# Patient Record
Sex: MALE | Race: WHITE | Employment: FULL TIME | URBAN - NONMETROPOLITAN AREA
[De-identification: names, ages, dates, MRNs, and addresses within clinical notes are randomized per-mention and may not be internally consistent; named-entity substitution may affect disease eponyms.]

---

## 2017-01-18 ENCOUNTER — OFFICE VISIT (OUTPATIENT)
Dept: FAMILY MEDICINE | Facility: OTHER | Age: 37
End: 2017-01-18
Attending: FAMILY MEDICINE
Payer: MEDICARE

## 2017-01-18 VITALS
TEMPERATURE: 97 F | BODY MASS INDEX: 31.57 KG/M2 | DIASTOLIC BLOOD PRESSURE: 74 MMHG | WEIGHT: 220 LBS | SYSTOLIC BLOOD PRESSURE: 110 MMHG | RESPIRATION RATE: 18 BRPM | OXYGEN SATURATION: 98 % | HEART RATE: 78 BPM

## 2017-01-18 DIAGNOSIS — F84.0 AUTISM: ICD-10-CM

## 2017-01-18 DIAGNOSIS — F70 MILD MENTAL RETARDATION (I.Q. 50-70): ICD-10-CM

## 2017-01-18 DIAGNOSIS — Z00.00 ROUTINE GENERAL MEDICAL EXAMINATION AT A HEALTH CARE FACILITY: Primary | ICD-10-CM

## 2017-01-18 PROCEDURE — 99395 PREV VISIT EST AGE 18-39: CPT | Performed by: FAMILY MEDICINE

## 2017-01-18 ASSESSMENT — PAIN SCALES - GENERAL: PAINLEVEL: NO PAIN (0)

## 2017-01-18 NOTE — MR AVS SNAPSHOT
"              After Visit Summary   1/18/2017    Casey Pedro    MRN: [id number]           Patient Information     Date Of Birth          1980        Visit Information        Provider Department      1/18/2017 9:00 AM Berny Velasquez MD [facility]3]bing        Today's Diagnoses     Annual NHS Examination    -  1     Mental retardation         Autism           Care Instructions    Continue same medications.          Follow-ups after your visit        Follow-up notes from your care team     Return in about 1 year (around 1/18/2018) for Comprehensive Exam.      Who to contact     If you have questions or need follow up information about today's clinic visit or your schedule please contact [facility] directly at [phone].  Normal or non-critical lab and imaging results will be communicated to you by MyChart, letter or phone within 4 business days after the clinic has received the results. If you do not hear from us within 7 days, please contact the clinic through MyChart or phone. If you have a critical or abnormal lab result, we will notify you by phone as soon as possible.  Submit refill requests through [employer] or call your pharmacy and they will forward the refill request to us. Please allow 3 business days for your refill to be completed.          Additional Information About Your Visit        MyChart Information     [employer] lets you send messages to your doctor, view your test results, renew your prescriptions, schedule appointments and more. To sign up, go to [email or web address] . Click on \"Log in\" on the left side of the screen, which will take you to the Welcome page. Then click on \"Sign up Now\" on the right side of the page.     You will be asked to enter the access code listed below, as well as some personal information. Please follow the directions to create your username and password.     Your access code is: [email or web address]  Expires: 4/20/2017  7:49 AM     Your access " code will  in 90 days. If you need help or a new code, please call your [facility] clinic or [phone].        Care EveryWhere ID     This is your Care EveryWhere ID. This could be used by other organizations to access your [facility] medical records  [id number]        Your Vitals Were     Pulse Temperature Respirations Pulse Oximetry          78 97  F (36.1  C) (Tympanic) 18 98%         Blood Pressure from Last 3 Encounters:   17 110/74   16 110/66   16 112/68    Weight from Last 3 Encounters:   17 220 lb (99.791 kg)   16 215 lb (97.523 kg)   16 212 lb (96.163 kg)              Today, you had the following     No orders found for display       Primary Care Provider Office Phone # Fax #    Berny Velasquez [phone]       [facility] [address] [place] [address]        Thank you!     Thank you for choosing [facility]  for your care. Our goal is always to provide you with excellent care. Hearing back from our patients is one way we can continue to improve our services. Please take a few minutes to complete the written survey that you may receive in the mail after your visit with us. Thank you!             Your Updated Medication List - Protect others around you: Learn how to safely use, store and throw away your medicines at www.disposemymeds.org.          This list is accurate as of: 17 11:59 PM.  Always use your most recent med list.                   Brand Name Dispense Instructions for use    guanFACINE 1 MG tablet    TENEX    60 tablet    Take 1 tablet (1 mg) by mouth 2 times daily       loratadine 10 MG tablet    CLARITIN    30 tablet    Take 1 tablet (10 mg) by mouth daily       omega 3 1000 MG Caps     100 capsule    Take 2 capsules by mouth daily       * zyPREXA 10 MG tablet   Generic drug:  OLANZapine      5 mg Take one tablet (10mg) by oral route every day       * ZYPREXA 20 MG tablet   Generic drug:  OLANZapine       Take 20 mg by mouth daily Take with the 10mg.       * ZYPREXA PO      Take 5 mg by mouth At noon and 2.5 mg as needed       * Notice:  This list has 3 medication(s) that are the same as other medications prescribed for you. Read the directions carefully, and ask your doctor or other care provider to review them with you.

## 2017-01-18 NOTE — NURSING NOTE
"Chief Complaint   Patient presents with     Physical       Initial /74 mmHg  Pulse 78  Temp(Src) 97  F (36.1  C) (Tympanic)  Resp 18  Wt 220 lb (99.791 kg)  SpO2 98% Estimated body mass index is 31.57 kg/(m^2) as calculated from the following:    Height as of 7/19/16: 5' 10\" (1.778 m).    Weight as of this encounter: 220 lb (99.791 kg).  BP completed using cuff size: yehuda Odonnell      "

## 2017-01-18 NOTE — PROGRESS NOTES
SUBJECTIVE:  Casey Pedro, 36 year old, male is seen for Annual [facility] Examination.  He generally feels well.  Staff notes no issues.    Denies chest pain, dyspnea, decreased exercise tolerance, dizzyness, nausea, vomiting, diaphoresis, palpitations, numbness, tingling, or paresthesias.      Current Outpatient Prescriptions   Medication Sig Dispense Refill     omega 3 1000 MG CAPS Take 2 capsules by mouth daily 100 capsule 5     loratadine (CLARITIN) 10 MG tablet Take 1 tablet (10 mg) by mouth daily 30 tablet 9     OLANZapine (ZYPREXA PO) Take 5 mg by mouth At noon and 2.5 mg as needed       guanFACINE (TENEX) 1 MG tablet Take 1 tablet (1 mg) by mouth 2 times daily 60 tablet 10     OLANZapine (ZYPREXA) 20 MG tablet Take 20 mg by mouth daily Take with the 10mg.       OLANZapine (ZYPREXA) 10 MG tablet 5 mg Take one tablet (10mg) by oral route every day          No Known Allergies    Past Medical History   Diagnosis Date     Autism 09/16/2011     Mental retardation 09/16/2011     BPH 09/16/2011     History reviewed. No pertinent past surgical history.  Family History   Problem Relation Age of Onset     Alcohol/Drug Father      alcoholism     Social History     Social History     Marital Status: Single     Spouse Name: N/A     Number of Children: N/A     Years of Education: N/A     Occupational History     Not on file.     Social History Main Topics     Smoking status: Never Smoker      Smokeless tobacco: Never Used      Comment: no passive exposure     Alcohol Use: No     Drug Use: No     Sexual Activity: No     Other Topics Concern     Caffeine Concern No     Exercise Yes     daily     Seat Belt Yes     Parent/Sibling W/ Cabg, Mi Or Angioplasty Before 65f 55m? No     unknown     Social History Narrative         Review Of Systems  Constitutional, neuro, ENT, endocrine, pulmonary, cardiac, gastrointestinal, genitourinary, musculoskeletal, integument and psychiatric systems are negative, except as otherwise noted by  staff.    OBJECTIVE:  Vitals: B/P: 110/68, T: 97.7, P: 92, R: 18    Exam:  Physical Exam   Constitutional: He is oriented to person, place, and time. He appears well-developed and well-nourished. No distress.   HENT:   Head: Normocephalic and atraumatic.   Right Ear: External ear normal.   Left Ear: External ear normal.   Nose: Nose normal.   Mouth/Throat: Oropharynx is clear and moist.   Eyes: Conjunctivae and EOM are normal. Pupils are equal, round, and reactive to light.   Neck: Normal range of motion. Neck supple. No JVD present. No thyromegaly present.   Cardiovascular: Normal rate, regular rhythm, normal heart sounds and intact distal pulses.  Exam reveals no gallop and no friction rub.    No murmur heard.  Pulmonary/Chest: Effort normal and breath sounds normal. He has no wheezes. He has no rales.   Abdominal: Soft. Bowel sounds are normal. He exhibits no mass. There is no tenderness.   Genitourinary: Rectum normal, prostate normal and penis normal.   Musculoskeletal: Normal range of motion.   Lymphadenopathy:     He has no cervical adenopathy.   Neurological: He is alert and oriented to person, place, and time. He has normal reflexes.   Skin: Skin is warm and dry.   Psychiatric: He has a normal mood and affect.         Labs:  Office Visit on 11/18/2015   Component Date Value Ref Range Status     WBC 11/18/2015 6.8  4.0 - 11.0 10e9/L Final     RBC Count 11/18/2015 5.46  4.4 - 5.9 10e12/L Final     Hemoglobin 11/18/2015 15.9  13.3 - 17.7 g/dL Final     Hematocrit 11/18/2015 45.7  40.0 - 53.0 % Final     MCV 11/18/2015 84  78 - 100 fl Final     MCH 11/18/2015 29.1  26.5 - 33.0 pg Final     MCHC 11/18/2015 34.8  31.5 - 36.5 g/dL Final     RDW 11/18/2015 13.3  10.0 - 15.0 % Final     Platelet Count 11/18/2015 213  150 - 450 10e9/L Final     Diff Method 11/18/2015 Automated Method   Final     % Neutrophils 11/18/2015 60.5   Final     % Lymphocytes 11/18/2015 24.1   Final     % Monocytes 11/18/2015 12.1   Final      % Eosinophils 11/18/2015 2.7   Final     % Basophils 11/18/2015 0.3   Final     % Immature Granulocytes 11/18/2015 0.3   Final     Absolute Neutrophil 11/18/2015 4.1  1.6 - 8.3 10e9/L Final     Absolute Lymphocytes 11/18/2015 1.6  0.8 - 5.3 10e9/L Final     Absolute Monoctyes 11/18/2015 0.8  0.0 - 1.3 10e9/L Final     Absolute Eosinophils 11/18/2015 0.2  0.0 - 0.7 10e9/L Final     Absolute Basophils 11/18/2015 0.0  0.0 - 0.2 10e9/L Final     Abs Immature Granulocytes 11/18/2015 0.0  0 - 0.4 10e9/L Final     Sodium 11/18/2015 139  133 - 144 mmol/L Final     Potassium 11/18/2015 4.2  3.4 - 5.3 mmol/L Final     Chloride 11/18/2015 108  94 - 109 mmol/L Final     Carbon Dioxide 11/18/2015 26  20 - 32 mmol/L Final     Anion Gap 11/18/2015 5  3 - 14 mmol/L Final     Glucose 11/18/2015 88  70 - 99 mg/dL Final     Urea Nitrogen 11/18/2015 11  7 - 30 mg/dL Final     Creatinine 11/18/2015 0.76  0.66 - 1.25 mg/dL Final     GFR Estimate 11/18/2015   >60 mL/min/1.7m2 Final                    Value:>90  Non  GFR Calc       GFR Estimate If Black 11/18/2015   >60 mL/min/1.7m2 Final                    Value:>90   GFR Calc       Calcium 11/18/2015 9.1  8.5 - 10.1 mg/dL Final     TSH 11/18/2015 0.68  0.40 - 4.00 mU/L Final       ASSESSMENT/PLAN:  Annual NHS Examination  Discussed with staff.  Current medications, allergies, and plan of care reviewed. Nutritional and screening labs drawn.  Resident is free of communicable disease and does not need 24 hour professional nursing care.  Routine exam one year.        Mental retardation  Stable    Autism  Behaviors reviewed              Berny Velasquez MD

## 2017-01-31 ENCOUNTER — TRANSFERRED RECORDS (OUTPATIENT)
Dept: HEALTH INFORMATION MANAGEMENT | Facility: HOSPITAL | Age: 37
End: 2017-01-31

## 2017-02-24 ENCOUNTER — TELEPHONE (OUTPATIENT)
Dept: FAMILY MEDICINE | Facility: OTHER | Age: 37
End: 2017-02-24

## 2017-02-24 DIAGNOSIS — F91.9 DISRUPTIVE BEHAVIOR DISORDER: ICD-10-CM

## 2017-02-24 DIAGNOSIS — K21.9 GASTROESOPHAGEAL REFLUX DISEASE WITHOUT ESOPHAGITIS: ICD-10-CM

## 2017-02-24 DIAGNOSIS — K21.9 GASTROESOPHAGEAL REFLUX DISEASE WITHOUT ESOPHAGITIS: Primary | ICD-10-CM

## 2017-02-24 LAB
BASOPHILS # BLD AUTO: 0.1 10E9/L (ref 0–0.2)
BASOPHILS NFR BLD AUTO: 0.8 %
DIFFERENTIAL METHOD BLD: NORMAL
EOSINOPHIL # BLD AUTO: 0.2 10E9/L (ref 0–0.7)
EOSINOPHIL NFR BLD AUTO: 3.9 %
ERYTHROCYTE [DISTWIDTH] IN BLOOD BY AUTOMATED COUNT: 12.2 % (ref 10–15)
HCT VFR BLD AUTO: 44.2 % (ref 40–53)
HGB BLD-MCNC: 15.6 G/DL (ref 13.3–17.7)
IMM GRANULOCYTES # BLD: 0 10E9/L (ref 0–0.4)
IMM GRANULOCYTES NFR BLD: 0.5 %
LYMPHOCYTES # BLD AUTO: 2.7 10E9/L (ref 0.8–5.3)
LYMPHOCYTES NFR BLD AUTO: 44.6 %
MCH RBC QN AUTO: 29.7 PG (ref 26.5–33)
MCHC RBC AUTO-ENTMCNC: 35.3 G/DL (ref 31.5–36.5)
MCV RBC AUTO: 84 FL (ref 78–100)
MONOCYTES # BLD AUTO: 0.5 10E9/L (ref 0–1.3)
MONOCYTES NFR BLD AUTO: 8.8 %
NEUTROPHILS # BLD AUTO: 2.5 10E9/L (ref 1.6–8.3)
NEUTROPHILS NFR BLD AUTO: 41.4 %
NRBC # BLD AUTO: 0 10*3/UL
NRBC BLD AUTO-RTO: 0 /100
PLATELET # BLD AUTO: 186 10E9/L (ref 150–450)
RBC # BLD AUTO: 5.25 10E12/L (ref 4.4–5.9)
WBC # BLD AUTO: 5.9 10E9/L (ref 4–11)

## 2017-02-24 PROCEDURE — 36415 COLL VENOUS BLD VENIPUNCTURE: CPT | Performed by: FAMILY MEDICINE

## 2017-02-24 PROCEDURE — 85025 COMPLETE CBC W/AUTO DIFF WBC: CPT | Performed by: FAMILY MEDICINE

## 2017-02-24 RX ORDER — OMEPRAZOLE 40 MG/1
40 CAPSULE, DELAYED RELEASE ORAL DAILY
Qty: 30 CAPSULE | Refills: 3 | Status: SHIPPED | OUTPATIENT
Start: 2017-02-24 | End: 2017-06-14

## 2017-02-24 NOTE — TELEPHONE ENCOUNTER
Has had increased behaviors, had recent dental visit, dentist suggest looks like may to be having acid reflux by looks of teeth. Nurse is wondering if could be prescribed antiacid? Or to give tums after meals? Also was put on depakote by [facility] and nurse is wondering if could have CBC ordered, has had history of low WBC with use of depakote. Had calls into primary since yesterday with no return answer.

## 2017-05-08 DIAGNOSIS — Z79.899 ENCOUNTER FOR LONG-TERM (CURRENT) USE OF MEDICATIONS: Primary | ICD-10-CM

## 2017-05-08 LAB
ALBUMIN SERPL-MCNC: 4 G/DL (ref 3.4–5)
ALP SERPL-CCNC: 41 U/L (ref 40–150)
ALT SERPL W P-5'-P-CCNC: 93 U/L (ref 0–70)
ANION GAP SERPL CALCULATED.3IONS-SCNC: 8 MMOL/L (ref 3–14)
AST SERPL W P-5'-P-CCNC: 43 U/L (ref 0–45)
BASOPHILS # BLD AUTO: 0 10E9/L (ref 0–0.2)
BASOPHILS NFR BLD AUTO: 0.9 %
BILIRUB SERPL-MCNC: 0.5 MG/DL (ref 0.2–1.3)
BUN SERPL-MCNC: 15 MG/DL (ref 7–30)
CALCIUM SERPL-MCNC: 8.8 MG/DL (ref 8.5–10.1)
CHLORIDE SERPL-SCNC: 106 MMOL/L (ref 94–109)
CO2 SERPL-SCNC: 28 MMOL/L (ref 20–32)
CREAT SERPL-MCNC: 0.79 MG/DL (ref 0.66–1.25)
DIFFERENTIAL METHOD BLD: NORMAL
EOSINOPHIL # BLD AUTO: 0.1 10E9/L (ref 0–0.7)
EOSINOPHIL NFR BLD AUTO: 2.8 %
ERYTHROCYTE [DISTWIDTH] IN BLOOD BY AUTOMATED COUNT: 12.5 % (ref 10–15)
EST. AVERAGE GLUCOSE BLD GHB EST-MCNC: 97 MG/DL
GFR SERPL CREATININE-BSD FRML MDRD: ABNORMAL ML/MIN/1.7M2
GLUCOSE SERPL-MCNC: 90 MG/DL (ref 70–99)
HBA1C MFR BLD: 5 % (ref 4.3–6)
HCT VFR BLD AUTO: 46.4 % (ref 40–53)
HGB BLD-MCNC: 16.3 G/DL (ref 13.3–17.7)
IMM GRANULOCYTES # BLD: 0 10E9/L (ref 0–0.4)
IMM GRANULOCYTES NFR BLD: 0.2 %
LYMPHOCYTES # BLD AUTO: 1.6 10E9/L (ref 0.8–5.3)
LYMPHOCYTES NFR BLD AUTO: 35.1 %
MCH RBC QN AUTO: 30.1 PG (ref 26.5–33)
MCHC RBC AUTO-ENTMCNC: 35.1 G/DL (ref 31.5–36.5)
MCV RBC AUTO: 86 FL (ref 78–100)
MONOCYTES # BLD AUTO: 0.4 10E9/L (ref 0–1.3)
MONOCYTES NFR BLD AUTO: 9.4 %
NEUTROPHILS # BLD AUTO: 2.4 10E9/L (ref 1.6–8.3)
NEUTROPHILS NFR BLD AUTO: 51.6 %
NRBC # BLD AUTO: 0 10*3/UL
NRBC BLD AUTO-RTO: 0 /100
PLATELET # BLD AUTO: 152 10E9/L (ref 150–450)
POTASSIUM SERPL-SCNC: 4.4 MMOL/L (ref 3.4–5.3)
PROT SERPL-MCNC: 7.5 G/DL (ref 6.8–8.8)
RBC # BLD AUTO: 5.41 10E12/L (ref 4.4–5.9)
SODIUM SERPL-SCNC: 142 MMOL/L (ref 133–144)
T4 FREE SERPL-MCNC: 0.93 NG/DL (ref 0.76–1.46)
TSH SERPL DL<=0.05 MIU/L-ACNC: 1.53 MU/L (ref 0.4–4)
VALPROATE SERPL-MCNC: 79 MG/L (ref 50–100)
WBC # BLD AUTO: 4.7 10E9/L (ref 4–11)

## 2017-05-08 PROCEDURE — 80164 ASSAY DIPROPYLACETIC ACD TOT: CPT | Mod: ZL

## 2017-05-08 PROCEDURE — 84439 ASSAY OF FREE THYROXINE: CPT | Mod: ZL

## 2017-05-08 PROCEDURE — 85025 COMPLETE CBC W/AUTO DIFF WBC: CPT | Mod: ZL

## 2017-05-08 PROCEDURE — 40000788 ZZHCL STATISTIC ESTIMATED AVERAGE GLUCOSE: Mod: ZL

## 2017-05-08 PROCEDURE — 84443 ASSAY THYROID STIM HORMONE: CPT | Mod: ZL

## 2017-05-08 PROCEDURE — 82306 VITAMIN D 25 HYDROXY: CPT | Mod: ZL,GZ

## 2017-05-08 PROCEDURE — 83036 HEMOGLOBIN GLYCOSYLATED A1C: CPT | Mod: ZL

## 2017-05-08 PROCEDURE — 80053 COMPREHEN METABOLIC PANEL: CPT | Mod: ZL

## 2017-05-08 PROCEDURE — 36415 COLL VENOUS BLD VENIPUNCTURE: CPT | Mod: ZL

## 2017-05-09 LAB — DEPRECATED CALCIDIOL+CALCIFEROL SERPL-MC: 14 UG/L (ref 20–75)

## 2017-05-23 ENCOUNTER — TELEPHONE (OUTPATIENT)
Dept: FAMILY MEDICINE | Facility: OTHER | Age: 37
End: 2017-05-23

## 2017-05-23 NOTE — TELEPHONE ENCOUNTER
9:44 AM    Reason for Call: Phone Call    Description: Patient is low on vitamin D, could you recommend a suppliment    Was an appointment offered for this call? N/A      Preferred method for responding to this message: TELEPHONE    f we cannot reach you directly, may we leave a detailed response at the number you provided? Yes    Can this message wait until your PCP/provider returns, if available today? YES    Juliann Tilley

## 2017-06-14 DIAGNOSIS — K21.9 GASTROESOPHAGEAL REFLUX DISEASE WITHOUT ESOPHAGITIS: ICD-10-CM

## 2017-06-14 DIAGNOSIS — F91.9 DISRUPTIVE BEHAVIOR DISORDER: ICD-10-CM

## 2017-06-15 NOTE — TELEPHONE ENCOUNTER
Prilosec 40 MG      Last Written Prescription Date: 02/24/17  Last Fill Quantity: 30,  # refills: 3   Last Office Visit with G, [facility] or [facility] prescribing provider: 01/18/17

## 2017-06-19 RX ORDER — OMEPRAZOLE 40 MG/1
CAPSULE, DELAYED RELEASE ORAL
Qty: 30 CAPSULE | Refills: 3 | Status: SHIPPED | OUTPATIENT
Start: 2017-06-19 | End: 2017-10-16

## 2017-07-25 DIAGNOSIS — Z79.899 DRUG THERAPY: Primary | ICD-10-CM

## 2017-07-25 LAB — VALPROATE SERPL-MCNC: 63 MG/L (ref 50–100)

## 2017-07-25 PROCEDURE — 80164 ASSAY DIPROPYLACETIC ACD TOT: CPT | Mod: ZL

## 2017-07-25 PROCEDURE — 36415 COLL VENOUS BLD VENIPUNCTURE: CPT | Mod: ZL

## 2017-07-26 ENCOUNTER — OFFICE VISIT (OUTPATIENT)
Dept: FAMILY MEDICINE | Facility: OTHER | Age: 37
End: 2017-07-26
Attending: FAMILY MEDICINE
Payer: MEDICARE

## 2017-07-26 VITALS
HEART RATE: 85 BPM | BODY MASS INDEX: 32.71 KG/M2 | SYSTOLIC BLOOD PRESSURE: 116 MMHG | WEIGHT: 228 LBS | DIASTOLIC BLOOD PRESSURE: 82 MMHG | TEMPERATURE: 97.3 F | OXYGEN SATURATION: 98 %

## 2017-07-26 DIAGNOSIS — F84.0 AUTISM: Primary | ICD-10-CM

## 2017-07-26 DIAGNOSIS — N42.9 DISORDER OF PROSTATE: ICD-10-CM

## 2017-07-26 DIAGNOSIS — F91.9 DISRUPTIVE BEHAVIOR DISORDER: ICD-10-CM

## 2017-07-26 LAB
ALBUMIN UR-MCNC: NEGATIVE MG/DL
ANION GAP SERPL CALCULATED.3IONS-SCNC: 6 MMOL/L (ref 3–14)
APPEARANCE UR: CLEAR
BASOPHILS # BLD AUTO: 0 10E9/L (ref 0–0.2)
BASOPHILS NFR BLD AUTO: 0.7 %
BILIRUB UR QL STRIP: NEGATIVE
BUN SERPL-MCNC: 10 MG/DL (ref 7–30)
CALCIUM SERPL-MCNC: 9 MG/DL (ref 8.5–10.1)
CHLORIDE SERPL-SCNC: 108 MMOL/L (ref 94–109)
CO2 SERPL-SCNC: 26 MMOL/L (ref 20–32)
COLOR UR AUTO: YELLOW
CREAT SERPL-MCNC: 0.7 MG/DL (ref 0.66–1.25)
DIFFERENTIAL METHOD BLD: NORMAL
EOSINOPHIL # BLD AUTO: 0.1 10E9/L (ref 0–0.7)
EOSINOPHIL NFR BLD AUTO: 2 %
ERYTHROCYTE [DISTWIDTH] IN BLOOD BY AUTOMATED COUNT: 12.3 % (ref 10–15)
GFR SERPL CREATININE-BSD FRML MDRD: ABNORMAL ML/MIN/1.7M2
GLUCOSE SERPL-MCNC: 113 MG/DL (ref 70–99)
GLUCOSE UR STRIP-MCNC: NEGATIVE MG/DL
HCT VFR BLD AUTO: 45.9 % (ref 40–53)
HGB BLD-MCNC: 16.4 G/DL (ref 13.3–17.7)
HGB UR QL STRIP: NEGATIVE
IMM GRANULOCYTES # BLD: 0 10E9/L (ref 0–0.4)
IMM GRANULOCYTES NFR BLD: 0.2 %
KETONES UR STRIP-MCNC: NEGATIVE MG/DL
LEUKOCYTE ESTERASE UR QL STRIP: NEGATIVE
LYMPHOCYTES # BLD AUTO: 1.7 10E9/L (ref 0.8–5.3)
LYMPHOCYTES NFR BLD AUTO: 37.9 %
MCH RBC QN AUTO: 30.5 PG (ref 26.5–33)
MCHC RBC AUTO-ENTMCNC: 35.7 G/DL (ref 31.5–36.5)
MCV RBC AUTO: 86 FL (ref 78–100)
MONOCYTES # BLD AUTO: 0.5 10E9/L (ref 0–1.3)
MONOCYTES NFR BLD AUTO: 10.8 %
NEUTROPHILS # BLD AUTO: 2.2 10E9/L (ref 1.6–8.3)
NEUTROPHILS NFR BLD AUTO: 48.4 %
NITRATE UR QL: NEGATIVE
NRBC # BLD AUTO: 0 10*3/UL
NRBC BLD AUTO-RTO: 0 /100
PH UR STRIP: 8 PH (ref 4.7–8)
PLATELET # BLD AUTO: 153 10E9/L (ref 150–450)
POTASSIUM SERPL-SCNC: 4.1 MMOL/L (ref 3.4–5.3)
RBC # BLD AUTO: 5.37 10E12/L (ref 4.4–5.9)
SODIUM SERPL-SCNC: 140 MMOL/L (ref 133–144)
SP GR UR STRIP: 1.01 (ref 1–1.03)
URN SPEC COLLECT METH UR: NORMAL
UROBILINOGEN UR STRIP-MCNC: NORMAL MG/DL (ref 0–2)
WBC # BLD AUTO: 4.5 10E9/L (ref 4–11)

## 2017-07-26 PROCEDURE — 81003 URINALYSIS AUTO W/O SCOPE: CPT | Mod: ZL | Performed by: FAMILY MEDICINE

## 2017-07-26 PROCEDURE — 80048 BASIC METABOLIC PNL TOTAL CA: CPT | Mod: ZL | Performed by: FAMILY MEDICINE

## 2017-07-26 PROCEDURE — 85025 COMPLETE CBC W/AUTO DIFF WBC: CPT | Mod: ZL | Performed by: FAMILY MEDICINE

## 2017-07-26 PROCEDURE — 99212 OFFICE O/P EST SF 10 MIN: CPT

## 2017-07-26 PROCEDURE — 99213 OFFICE O/P EST LOW 20 MIN: CPT | Performed by: FAMILY MEDICINE

## 2017-07-26 PROCEDURE — 36415 COLL VENOUS BLD VENIPUNCTURE: CPT | Mod: ZL | Performed by: FAMILY MEDICINE

## 2017-07-26 RX ORDER — LORAZEPAM 1 MG/1
1 TABLET ORAL 2 TIMES DAILY
COMMUNITY
End: 2018-05-29

## 2017-07-26 RX ORDER — DIVALPROEX SODIUM 500 MG/1
500 TABLET, DELAYED RELEASE ORAL 2 TIMES DAILY
COMMUNITY
End: 2018-05-29

## 2017-07-26 RX ORDER — DIVALPROEX SODIUM 250 MG/1
250 TABLET, DELAYED RELEASE ORAL 2 TIMES DAILY
COMMUNITY
End: 2017-10-23

## 2017-07-26 ASSESSMENT — PAIN SCALES - GENERAL: PAINLEVEL: NO PAIN (0)

## 2017-07-26 NOTE — MR AVS SNAPSHOT
"              After Visit Summary   7/26/2017    Casey Pedro    MRN: [id number]           Patient Information     Date Of Birth          1980        Visit Information        Provider Department      7/26/2017 10:20 AM Berny Velasquez MD [facility]        Today's Diagnoses     Autism    -  1    Disruptive behavior disorder        Disorder of prostate           Care Instructions    Continue same medications.            Follow-ups after your visit        Follow-up notes from your care team     Return if symptoms worsen or fail to improve.      Who to contact     If you have questions or need follow up information about today's clinic visit or your schedule please contact [facility] directly at [phone].  Normal or non-critical lab and imaging results will be communicated to you by MyChart, letter or phone within 4 business days after the clinic has received the results. If you do not hear from us within 7 days, please contact the clinic through MyChart or phone. If you have a critical or abnormal lab result, we will notify you by phone as soon as possible.  Submit refill requests through [employer] or call your pharmacy and they will forward the refill request to us. Please allow 3 business days for your refill to be completed.          Additional Information About Your Visit        MyChart Information     [employer] lets you send messages to your doctor, view your test results, renew your prescriptions, schedule appointments and more. To sign up, go to [email or web address] . Click on \"Log in\" on the left side of the screen, which will take you to the Welcome page. Then click on \"Sign up Now\" on the right side of the page.     You will be asked to enter the access code listed below, as well as some personal information. Please follow the directions to create your username and password.     Your access code is: [email or web address]  Expires: 10/27/2017  4:12 AM     Your access code will "  in 90 days. If you need help or a new code, please call your [facility] clinic or [phone].        Care EveryWhere ID     This is your Care EveryWhere ID. This could be used by other organizations to access your [facility] medical records  [id number]        Your Vitals Were     Pulse Temperature Pulse Oximetry BMI (Body Mass Index)          85 97.3  F (36.3  C) 98% 32.71 kg/m2         Blood Pressure from Last 3 Encounters:   17 116/82   17 110/74   16 110/66    Weight from Last 3 Encounters:   17 228 lb (103.4 kg)   17 220 lb (99.8 kg)   16 215 lb (97.5 kg)              We Performed the Following     Basic metabolic panel     CBC with platelets differential     UA reflex to Microscopic and Culture        Primary Care Provider Office Phone # Fax #    Berny Velasquez [phone]       [facility] [address]        Equal Access to Services     WU FARIAS : Hadii aad ku hadasho Soomaali, waaxda luqadaha, qaybta kaalmada adeegyada, waxkyle kilpatrickin hayalaina magallon . So [facility] [phone].    ATENCIÓN: Si habla español, tiene a saunders disposición servicios gratuitos de asistencia lingüística. Llame al [phone].    We comply with applicable federal civil rights laws and [place] laws. We do not discriminate on the basis of race, color, national origin, age, disability sex, sexual orientation or gender identity.            Thank you!     [facility]. Our goal is always to provide you with excellent care. Hearing back from our patients is one way we can continue to improve our services. Please take a few minutes to complete the written survey that you may receive in the mail after your visit with us. Thank you!             Your Updated Medication List - Protect others around you: Learn how to safely use, store and throw away your medicines at www.disposemymeds.org.           This list is accurate as of: 7/26/17 11:59 PM.  Always use your most recent med list.                   Brand Name Dispense Instructions for use Diagnosis    * divalproex 250 MG EC tablet    DEPAKOTE     Take 250 mg by mouth 2 times daily        * divalproex 500 MG EC tablet    DEPAKOTE     Take 500 mg by mouth 2 times daily        guanFACINE 1 MG tablet    TENEX    60 tablet    Take 1 tablet (1 mg) by mouth 2 times daily    Diagnosis unknown       loratadine 10 MG tablet    CLARITIN    30 tablet    Take 1 tablet (10 mg) by mouth daily    Seasonal allergic rhinitis       LORazepam 1 MG tablet    ATIVAN     Take 1 mg by mouth 2 times daily        omega 3 1000 MG Caps     100 capsule    Take 2 capsules by mouth daily    Health care maintenance       omeprazole 40 MG capsule    priLOSEC    30 capsule    Take 1 capsule (40 mg) by mouth daily Take 30-60 minutes before a meal.    Gastroesophageal reflux disease without esophagitis, Disruptive behavior disorder       * zyPREXA 10 MG tablet   Generic drug:  OLANZapine      10 mg Take one tablet (10mg) by oral route every day        * ZYPREXA 20 MG tablet   Generic drug:  OLANZapine      Take 20 mg by mouth daily Take with the 10mg.        * ZYPREXA PO      Take 5 mg by mouth At 11am    Disruptive behavior disorder       * Notice:  This list has 5 medication(s) that are the same as other medications prescribed for you. Read the directions carefully, and ask your doctor or other care provider to review them with you.

## 2017-07-26 NOTE — PROGRESS NOTES
SUBJECTIVE:  Casey Pedro, 36 year old, male is seen with increasing behaviors.  Staff has noted him to become more agitated and aggressive.  He has a history of autism as well as disruptive behavior disorder.  Medications are reviewed.    Current Outpatient Prescriptions   Medication Sig Dispense Refill     divalproex (DEPAKOTE) 250 MG EC tablet Take 250 mg by mouth 2 times daily       divalproex (DEPAKOTE) 500 MG EC tablet Take 500 mg by mouth 2 times daily       LORazepam (ATIVAN) 1 MG tablet Take 1 mg by mouth 2 times daily       omeprazole (PRILOSEC) 40 MG capsule Take 1 capsule (40 mg) by mouth daily Take 30-60 minutes before a meal. 30 capsule 3     omega 3 1000 MG CAPS Take 2 capsules by mouth daily 100 capsule 5     loratadine (CLARITIN) 10 MG tablet Take 1 tablet (10 mg) by mouth daily 30 tablet 9     OLANZapine (ZYPREXA PO) Take 5 mg by mouth At 11am       guanFACINE (TENEX) 1 MG tablet Take 1 tablet (1 mg) by mouth 2 times daily 60 tablet 10     OLANZapine (ZYPREXA) 20 MG tablet Take 20 mg by mouth daily Take with the 10mg.       OLANZapine (ZYPREXA) 10 MG tablet 10 mg Take one tablet (10mg) by oral route every day          No Known Allergies    Past Medical History:   Diagnosis Date     Autism 09/16/2011     BPH 09/16/2011     Mental retardation 09/16/2011     History reviewed. No pertinent surgical history.  Family History   Problem Relation Age of Onset     Alcohol/Drug Father      alcoholism     Social History     Social History     Marital status: Single     Spouse name: N/A     Number of children: N/A     Years of education: N/A     Occupational History     Not on file.     Social History Main Topics     Smoking status: Never Smoker     Smokeless tobacco: Never Used      Comment: no passive exposure     Alcohol use No     Drug use: No     Sexual activity: No     Other Topics Concern     Caffeine Concern No     Exercise Yes     daily     Seat Belt Yes     Parent/Sibling W/ Cabg, Mi Or Angioplasty  Before 65f 55m? No     unknown     Social History Narrative         Review Of Systems  Constitutional, HEENT, cardiovascular, pulmonary, gi and gu systems are negative, except as otherwise noted.      OBJECTIVE:  Vitals: B/P: 116/82, T: 97.3, P: 85, R: Data Unavailable    Exam  :Physical Exam   Constitutional: He is oriented to person, place, and time. He appears well-developed and well-nourished. No distress.   Neurological: He is alert and oriented to person, place, and time.   Psychiatric: He has a normal mood and affect.     Other exam not repeated    Labs:  Orders Only on 07/25/2017   Component Date Value Ref Range Status     Valproic Acid Level 07/25/2017 63  50 - 100 mg/L Final       ASSESSMENT/PLAN:  Autism  As above    Disruptive behavior disorder  No medical reason for increasing behaviors.  Recommend Mental Health follow up.  - CBC with platelets differential  - UA reflex to Microscopic and Culture  - Basic metabolic panel    Disorder of prostate   Stable.  - CBC with platelets differential            Berny Velasquez MD

## 2017-07-26 NOTE — NURSING NOTE
"Chief Complaint   Patient presents with     Behavioral Problem     increased aggression   Unable to complete PHQ-9 and SAL-7    Initial /82 (BP Location: Left arm, Patient Position: Chair, Cuff Size: Adult Regular)  Pulse 85  Temp 97.3  F (36.3  C)  Wt 228 lb (103.4 kg)  SpO2 98%  BMI 32.71 kg/m2 Estimated body mass index is 32.71 kg/(m^2) as calculated from the following:    Height as of 7/19/16: 5' 10\" (1.778 m).    Weight as of this encounter: 228 lb (103.4 kg).  Medication Reconciliation: complete     Luanne Craven      "

## 2017-08-16 ENCOUNTER — TELEPHONE (OUTPATIENT)
Dept: FAMILY MEDICINE | Facility: OTHER | Age: 37
End: 2017-08-16

## 2017-08-16 NOTE — TELEPHONE ENCOUNTER
4:01 PM    Reason for Call: Phone Call    Description: Gerda from [facility] called and was wondering if pt has had a CBC or WC run in the last 6 mos? If so, could you please fax them to her at [phone]?     Was an appointment offered for this call? No  If yes : Appointment type              Date    Preferred method for responding to this message: Telephone Call  What is your phone number ?  [phone]    If we cannot reach you directly, may we leave a detailed response at the number you provided? Yes    Can this message wait until your PCP/provider returns, if available today? No, if unable to get back to her today, if someone covering for Dr Velasquez can get back to her in the next couple days    Annabelle Leiva

## 2017-08-31 ENCOUNTER — TELEPHONE (OUTPATIENT)
Dept: FAMILY MEDICINE | Facility: OTHER | Age: 37
End: 2017-08-31

## 2017-08-31 DIAGNOSIS — J30.2 CHRONIC SEASONAL ALLERGIC RHINITIS, UNSPECIFIED TRIGGER: Primary | ICD-10-CM

## 2017-08-31 RX ORDER — CETIRIZINE HYDROCHLORIDE 10 MG/1
10 TABLET ORAL EVERY EVENING
Qty: 30 TABLET | Refills: 1 | Status: SHIPPED | OUTPATIENT
Start: 2017-08-31 | End: 2017-10-31

## 2017-08-31 NOTE — TELEPHONE ENCOUNTER
10:16 AM    Reason for Call: Phone Call    Description: Pt's manager at home care, Carly called to request either an alternative or an increase in pt's medication Claritin.  Care coordinator stated that it wasn't working currently for the pt. Nurse please advise as it was mentioned that PCP Ron is out today.  She requested a call back from a covering nurse/provider today.    Was an appointment offered for this call? No  If yes : Appointment type              Date    Preferred method for responding to this message: Telephone Call: [phone] Carly's phone, pt's care coordinator/manager of home living  What is your phone number ?    If we cannot reach you directly, may we leave a detailed response at the number you provided? Yes    Can this message wait until your PCP/provider returns, if available today? No, the care coordinator Carly requested call made today even when known PCP is out  Rhonda Estevez

## 2017-09-13 DIAGNOSIS — F70 MILD INTELLECTUAL DISABILITIES: ICD-10-CM

## 2017-09-13 DIAGNOSIS — F84.0 AUTISM: Primary | ICD-10-CM

## 2017-09-13 DIAGNOSIS — F91.9 CONDUCT DISORDER: ICD-10-CM

## 2017-09-13 LAB — VALPROATE SERPL-MCNC: 68 MG/L (ref 50–100)

## 2017-09-13 PROCEDURE — 80164 ASSAY DIPROPYLACETIC ACD TOT: CPT | Mod: ZL

## 2017-09-13 PROCEDURE — 36415 COLL VENOUS BLD VENIPUNCTURE: CPT | Mod: ZL

## 2017-10-16 DIAGNOSIS — F91.9 DISRUPTIVE BEHAVIOR DISORDER: ICD-10-CM

## 2017-10-16 DIAGNOSIS — K21.9 GASTROESOPHAGEAL REFLUX DISEASE WITHOUT ESOPHAGITIS: ICD-10-CM

## 2017-10-18 RX ORDER — OMEPRAZOLE 40 MG/1
CAPSULE, DELAYED RELEASE ORAL
Qty: 90 CAPSULE | Refills: 0 | Status: SHIPPED | OUTPATIENT
Start: 2017-10-18 | End: 2018-01-09

## 2017-10-23 ENCOUNTER — RADIANT APPOINTMENT (OUTPATIENT)
Dept: GENERAL RADIOLOGY | Facility: OTHER | Age: 37
End: 2017-10-23
Attending: FAMILY MEDICINE
Payer: MEDICARE

## 2017-10-23 ENCOUNTER — OFFICE VISIT (OUTPATIENT)
Dept: FAMILY MEDICINE | Facility: OTHER | Age: 37
End: 2017-10-23
Attending: FAMILY MEDICINE
Payer: MEDICARE

## 2017-10-23 VITALS
TEMPERATURE: 97.5 F | DIASTOLIC BLOOD PRESSURE: 64 MMHG | WEIGHT: 244 LBS | HEIGHT: 70 IN | BODY MASS INDEX: 34.93 KG/M2 | SYSTOLIC BLOOD PRESSURE: 118 MMHG | HEART RATE: 94 BPM | OXYGEN SATURATION: 96 %

## 2017-10-23 DIAGNOSIS — Z23 NEED FOR PROPHYLACTIC VACCINATION AND INOCULATION AGAINST INFLUENZA: Primary | ICD-10-CM

## 2017-10-23 DIAGNOSIS — R30.0 DYSURIA: ICD-10-CM

## 2017-10-23 DIAGNOSIS — M25.511 ACUTE PAIN OF RIGHT SHOULDER: ICD-10-CM

## 2017-10-23 LAB
ALBUMIN UR-MCNC: NEGATIVE MG/DL
APPEARANCE UR: CLEAR
BILIRUB UR QL STRIP: NEGATIVE
COLOR UR AUTO: YELLOW
GLUCOSE UR STRIP-MCNC: NEGATIVE MG/DL
HGB UR QL STRIP: NEGATIVE
KETONES UR STRIP-MCNC: 5 MG/DL
LEUKOCYTE ESTERASE UR QL STRIP: NEGATIVE
NITRATE UR QL: NEGATIVE
PH UR STRIP: 6.5 PH (ref 4.7–8)
SOURCE: ABNORMAL
SP GR UR STRIP: 1.02 (ref 1–1.03)
UROBILINOGEN UR STRIP-MCNC: 2 MG/DL (ref 0–2)

## 2017-10-23 PROCEDURE — G0008 ADMIN INFLUENZA VIRUS VAC: HCPCS | Performed by: FAMILY MEDICINE

## 2017-10-23 PROCEDURE — 99212 OFFICE O/P EST SF 10 MIN: CPT

## 2017-10-23 PROCEDURE — 81003 URINALYSIS AUTO W/O SCOPE: CPT | Mod: ZL | Performed by: FAMILY MEDICINE

## 2017-10-23 PROCEDURE — 73030 X-RAY EXAM OF SHOULDER: CPT | Mod: TC,RT

## 2017-10-23 PROCEDURE — 90686 IIV4 VACC NO PRSV 0.5 ML IM: CPT | Performed by: FAMILY MEDICINE

## 2017-10-23 PROCEDURE — 99214 OFFICE O/P EST MOD 30 MIN: CPT | Performed by: FAMILY MEDICINE

## 2017-10-23 RX ORDER — IBUPROFEN 800 MG/1
800 TABLET, FILM COATED ORAL EVERY 8 HOURS PRN
Qty: 90 TABLET | Refills: 1 | Status: SHIPPED | OUTPATIENT
Start: 2017-10-23 | End: 2018-02-06

## 2017-10-23 ASSESSMENT — ANXIETY QUESTIONNAIRES
3. WORRYING TOO MUCH ABOUT DIFFERENT THINGS: NOT AT ALL
7. FEELING AFRAID AS IF SOMETHING AWFUL MIGHT HAPPEN: NOT AT ALL
5. BEING SO RESTLESS THAT IT IS HARD TO SIT STILL: NOT AT ALL
1. FEELING NERVOUS, ANXIOUS, OR ON EDGE: NOT AT ALL
GAD7 TOTAL SCORE: 1
6. BECOMING EASILY ANNOYED OR IRRITABLE: SEVERAL DAYS
4. TROUBLE RELAXING: NOT AT ALL
2. NOT BEING ABLE TO STOP OR CONTROL WORRYING: NOT AT ALL

## 2017-10-23 ASSESSMENT — PATIENT HEALTH QUESTIONNAIRE - PHQ9: SUM OF ALL RESPONSES TO PHQ QUESTIONS 1-9: 0

## 2017-10-23 ASSESSMENT — PAIN SCALES - GENERAL: PAINLEVEL: WORST PAIN (10)

## 2017-10-23 NOTE — MR AVS SNAPSHOT
"              After Visit Summary   10/23/2017    Casey Pedro    MRN: [id number]           Patient Information     Date Of Birth          1980        Visit Information        Provider Department      10/23/2017 8:40 AM Berny Vel[facility]        Today's Diagnoses     Need for prophylactic vaccination and inoculation against influenza    -  1    Acute pain of right shoulder        Dysuria          Care Instructions    Continue same medications.            Follow-ups after your visit        Follow-up notes from your care team     Return if symptoms worsen or fail to improve.      Who to contact     If you have questions or need follow up information about today's clinic visit or your schedule please contact [facility] directly at [phone].  Normal or non-critical lab and imaging results will be communicated to you by MyChart, letter or phone within 4 business days after the clinic has received the results. If you do not hear from us within 7 days, please contact the clinic through MyChart or phone. If you have a critical or abnormal lab result, we will notify you by phone as soon as possible.  Submit refill requests through [employer] or call your pharmacy and they will forward the refill request to us. Please allow 3 business days for your refill to be completed.          Additional Information About Your Visit        MyChart Information     [employer] lets you send messages to your doctor, view your test results, renew your prescriptions, schedule appointments and more. To sign up, go to [email or web address] . Click on \"Log in\" on the left side of the screen, which will take you to the Welcome page. Then click on \"Sign up Now\" on the right side of the page.     You will be asked to enter the access code listed below, as well as some personal information. Please follow the directions to create your username and password.     Your access code is: " [email or web address]  Expires: 10/27/2017  4:12 AM     Your access code will  in 90 days. If you need help or a new code, please call your [facility] clinic or [phone].        Care EveryWhere ID     This is your Care EveryWhere ID. This could be used by other organizations to access your [facility] medical records  [id number]        Your Vitals Were     Pulse Temperature Height Pulse Oximetry BMI (Body Mass Index)       94 97.5  F (36.4  C) 5' 9.5\" (1.765 m) 96% 35.52 kg/m2        Blood Pressure from Last 3 Encounters:   10/23/17 118/64   17 116/82   17 110/74    Weight from Last 3 Encounters:   10/23/17 244 lb (110.7 kg)   17 228 lb (103.4 kg)   17 220 lb (99.8 kg)              We Performed the Following     HC FLU VAC PRESRV FREE QUAD SPLIT VIR 3+YRS IM     UA reflex to Microscopic and Culture     Vaccine Administration, Initial [12759]     XR SHOULDER RIGHT G/E 2 VIEWS (Clinic Performed)          Today's Medication Changes          These changes are accurate as of: 10/23/17 11:59 PM.  If you have any questions, ask your nurse or doctor.               Start taking these medicines.        Dose/Directions    ibuprofen 800 MG tablet   Commonly known as:  ADVIL/MOTRIN   Used for:  Acute pain of right shoulder   Started by:  Berny Velasquez MD        Dose:  800 mg   Take 1 tablet (800 mg) by mouth every 8 hours as needed for moderate pain   Quantity:  90 tablet   Refills:  1         These medicines have changed or have updated prescriptions.        Dose/Directions    divalproex 500 MG EC tablet   Commonly known as:  DEPAKOTE   This may have changed:  Another medication with the same name was removed. Continue taking this medication, and follow the directions you see here.   Changed by:  Berny Velasquez MD        Dose:  500 mg   Take 500 mg by mouth 2 times daily   Refills:  0         Stop taking these medicines if you haven't already. Please contact your care team if you have questions.     " loratadine 10 MG tablet   Commonly known as:  CLARITIN   Stopped by:  Berny Velasquez MD                Where to get your medicines      These medications were sent to [facility] - [facility]heng - [address]  [address] [place] [address]     Phone:  [phone]     ibuprofen 800 MG tablet                Primary Care Provider Office Phone # Fax #    Berny Velasquez [phone]       [facility] [address] [address]        Equal Access to Services     [facility]: Hadii aad ku hadasho Soomaali, waaxda luqadaha, qaybta kaalmada adeegyada, waxay idiin hayaan adeeg hardik magallon . [facility] [phone].    ATENCIÓN: Si habla español, tiene a saunders disposición servicios gratuitos de asistencia lingüística. [facility] [phone].    We comply with applicable federal civil rights laws and [place] laws. We do not discriminate on the basis of race, color, national origin, age, disability, sex, sexual orientation, or gender identity.            Thank you!     Thank you for choosing [facility]  for your care. Our goal is always to provide you with excellent care. Hearing back from our patients is one way we can continue to improve our services. Please take a few minutes to complete the written survey that you may receive in the mail after your visit with us. Thank you!             Your Updated Medication List - Protect others around you: Learn how to safely use, store and throw away your medicines at www.disposemymeds.org.          This list is accurate as of: 10/23/17 11:59 PM.  Always use your most recent med list.                   Brand Name Dispense Instructions for use Diagnosis    cetirizine 10 MG tablet    zyrTEC    30 tablet    Take 1 tablet (10 mg) by mouth every evening    Chronic seasonal allergic rhinitis, unspecified trigger       divalproex 500 MG EC tablet    DEPAKOTE     Take 500 mg by mouth 2 times daily        guanFACINE 1 MG tablet     TENEX    60 tablet    Take 1 tablet (1 mg) by mouth 2 times daily    Diagnosis unknown       ibuprofen 800 MG tablet    ADVIL/MOTRIN    90 tablet    Take 1 tablet (800 mg) by mouth every 8 hours as needed for moderate pain    Acute pain of right shoulder       LORazepam 1 MG tablet    ATIVAN     Take 1 mg by mouth 2 times daily        omega 3 1000 MG Caps     100 capsule    Take 2 capsules by mouth daily    Health care maintenance       omeprazole 40 MG capsule    priLOSEC    90 capsule    Take 1 capsule (40 mg) by mouth daily Take 30-60 minutes before a meal.    Gastroesophageal reflux disease without esophagitis, Disruptive behavior disorder       * zyPREXA 10 MG tablet   Generic drug:  OLANZapine      10 mg Take one tablet (10mg) by oral route every day        * ZYPREXA 20 MG tablet   Generic drug:  OLANZapine      Take 20 mg by mouth daily Take with the 10mg.        * ZYPREXA PO      Take 5 mg by mouth At 11am    Disruptive behavior disorder       * OLANZAPINE PO      Take 5 mg by mouth 5mg tab dissolvable PRN        * Notice:  This list has 4 medication(s) that are the same as other medications prescribed for you. Read the directions carefully, and ask your doctor or other care provider to review them with you.

## 2017-10-23 NOTE — NURSING NOTE
"Chief Complaint   Patient presents with     Pain     unsure where pain is coming from not using right arm. voicing pain       Initial /64 (BP Location: Left arm, Patient Position: Chair, Cuff Size: Adult Large)  Pulse 94  Temp 97.5  F (36.4  C)  Ht 5' 9.5\" (1.765 m)  Wt 244 lb (110.7 kg)  SpO2 96%  BMI 35.52 kg/m2 Estimated body mass index is 35.52 kg/(m^2) as calculated from the following:    Height as of this encounter: 5' 9.5\" (1.765 m).    Weight as of this encounter: 244 lb (110.7 kg).  Medication Reconciliation: Manuela De Anda    "

## 2017-10-23 NOTE — PROGRESS NOTES
SUBJECTIVE:  Casey Pedro, 37 year old, male is seen with the following medical problems.    Right shoulder pain.  Staff has noted Casey favoring his right back and shoulder when performing movements such as getting dressed and lifting. He has a history of autism and cannot describe or localize the pain,  When asked he points to his shoulder and bladder area.  No traumas or associated symptoms.    Current Outpatient Prescriptions   Medication Sig Dispense Refill     OLANZAPINE PO Take 5 mg by mouth 5mg tab dissolvable PRN       omeprazole (PRILOSEC) 40 MG capsule Take 1 capsule (40 mg) by mouth daily Take 30-60 minutes before a meal. 90 capsule 0     cetirizine (ZYRTEC) 10 MG tablet Take 1 tablet (10 mg) by mouth every evening 30 tablet 1     divalproex (DEPAKOTE) 500 MG EC tablet Take 500 mg by mouth 2 times daily       LORazepam (ATIVAN) 1 MG tablet Take 1 mg by mouth 2 times daily       omega 3 1000 MG CAPS Take 2 capsules by mouth daily 100 capsule 5     OLANZapine (ZYPREXA PO) Take 5 mg by mouth At 11am       guanFACINE (TENEX) 1 MG tablet Take 1 tablet (1 mg) by mouth 2 times daily 60 tablet 10     OLANZapine (ZYPREXA) 20 MG tablet Take 20 mg by mouth daily Take with the 10mg.       OLANZapine (ZYPREXA) 10 MG tablet 10 mg Take one tablet (10mg) by oral route every day         [DISCONTINUED] divalproex (DEPAKOTE) 250 MG EC tablet Take 250 mg by mouth 2 times daily        No Known Allergies    Past Medical History:   Diagnosis Date     Autism 09/16/2011     BPH 09/16/2011     Mental retardation 09/16/2011     No past surgical history on file.  Family History   Problem Relation Age of Onset     Alcohol/Drug Father      alcoholism     Social History     Social History     Marital status: Single     Spouse name: N/A     Number of children: N/A     Years of education: N/A     Occupational History     Not on file.     Social History Main Topics     Smoking status: Never Smoker     Smokeless tobacco: Never Used       "Comment: no passive exposure     Alcohol use No     Drug use: No     Sexual activity: No     Other Topics Concern     Caffeine Concern No     Exercise Yes     daily     Seat Belt Yes     Parent/Sibling W/ Cabg, Mi Or Angioplasty Before 65f 55m? No     unknown     Social History Narrative         Review Of Systems  Constitutional, HEENT, cardiovascular, pulmonary, gi and gu systems are negative, except as otherwise noted.      OBJECTIVE:/64 (BP Location: Left arm, Patient Position: Chair, Cuff Size: Adult Large)  Pulse 94  Temp 97.5  F (36.4  C)  Ht 5' 9.5\" (1.765 m)  Wt 244 lb (110.7 kg)  SpO2 96%  BMI 35.52 kg/m2      Exam:  Physical Exam   Constitutional: He is oriented to person, place, and time. He appears well-developed and well-nourished. No distress.   Abdominal: Soft. Bowel sounds are normal. There is no tenderness. There is no CVA tenderness.   Musculoskeletal:   There is no tenderness noted over the anterior superior aspect of the right shoulder.  No crepitus is appreciated. ROM is not imited by pain in forward flexion, abduction, and internal rotation.  Impingement sign is negative.  Pulses are intact.     Neurological: He is alert and oriented to person, place, and time.   Psychiatric: He has a normal mood and affect.     Other exam not repeated    Labs:  Orders Only on 09/13/2017   Component Date Value Ref Range Status     Valproic Acid Level 09/13/2017 68  50 - 100 mg/L Final       ASSESSMENT/PLAN:  Acute pain of right shoulder  X rays reviewed and negative.  Etiology undetermined. Will continue ibuprofen as needed and follow.  - XR SHOULDER RIGHT G/E 2 VIEWS (Clinic Performed)  - ibuprofen (ADVIL/MOTRIN) 800 MG tablet; Take 1 tablet (800 mg) by mouth every 8 hours as needed for moderate pain    Dysuria  UA negative.  - UA reflex to Microscopic and Culture    Need for prophylactic vaccination and inoculation against influenza  Vaccine updated  - HC FLU VAC PRESRV FREE QUAD SPLIT VIR 3+YRS " IM  - Vaccine Administration, Initial [85600]            Berny Velasquez MD

## 2017-10-24 ASSESSMENT — ANXIETY QUESTIONNAIRES: GAD7 TOTAL SCORE: 1

## 2017-10-31 DIAGNOSIS — J30.2 CHRONIC SEASONAL ALLERGIC RHINITIS, UNSPECIFIED TRIGGER: ICD-10-CM

## 2017-10-31 DIAGNOSIS — Z00.00 HEALTH CARE MAINTENANCE: ICD-10-CM

## 2017-10-31 NOTE — TELEPHONE ENCOUNTER
GNP All Day Allergy Tablet 10 MG    Last Written Prescription Date: 8/31/2017  Last Fill Quantity: 30,  # refills: 1   Last Office Visit with [facility], [facility] or [facility] prescribing provider: 10/23/2017

## 2017-10-31 NOTE — TELEPHONE ENCOUNTER
Fish Oil      Last Written Prescription Date: 12/13/2016  Last Fill Quantity: 100,  # refills: 5   Last Office Visit with [facility], [facility] or [facility] prescribing provider: 10/23/2017

## 2017-11-02 RX ORDER — CHLORAL HYDRATE 500 MG
CAPSULE ORAL
Qty: 100 CAPSULE | Refills: 0 | Status: SHIPPED | OUTPATIENT
Start: 2017-11-02 | End: 2017-11-13

## 2017-11-02 RX ORDER — CETIRIZINE HYDROCHLORIDE 10 MG/1
TABLET, FILM COATED ORAL
Qty: 90 TABLET | Refills: 0 | Status: SHIPPED | OUTPATIENT
Start: 2017-11-02 | End: 2018-01-22

## 2017-11-13 ENCOUNTER — TELEPHONE (OUTPATIENT)
Dept: FAMILY MEDICINE | Facility: OTHER | Age: 37
End: 2017-11-13

## 2017-11-13 DIAGNOSIS — Z00.00 HEALTH CARE MAINTENANCE: ICD-10-CM

## 2017-11-13 DIAGNOSIS — F84.0 AUTISM: Primary | ICD-10-CM

## 2017-11-13 NOTE — TELEPHONE ENCOUNTER
Please have nurse call her back to discuss possible OT referral, requesting that it be with Kelly @ [facility].  Thank you

## 2017-11-13 NOTE — TELEPHONE ENCOUNTER
Wants to have referral  OT not PT through [facility] for behavior calming and sensory techniques.

## 2017-11-15 RX ORDER — CHLORAL HYDRATE 500 MG
CAPSULE ORAL
Qty: 100 CAPSULE | Refills: 2 | Status: SHIPPED | OUTPATIENT
Start: 2017-11-15 | End: 2018-05-14

## 2017-11-17 ENCOUNTER — TELEPHONE (OUTPATIENT)
Dept: FAMILY MEDICINE | Facility: OTHER | Age: 37
End: 2017-11-17

## 2017-11-17 NOTE — TELEPHONE ENCOUNTER
To: Dr Velasquez nurse  Please call her back @ [phone] (Mon 11/20 call back is fine)  She needs to verify if patient is living in a group home.  Thank you

## 2017-11-27 ENCOUNTER — TRANSFERRED RECORDS (OUTPATIENT)
Dept: HEALTH INFORMATION MANAGEMENT | Facility: HOSPITAL | Age: 37
End: 2017-11-27

## 2017-12-26 ENCOUNTER — OFFICE VISIT (OUTPATIENT)
Dept: FAMILY MEDICINE | Facility: OTHER | Age: 37
End: 2017-12-26
Attending: FAMILY MEDICINE
Payer: MEDICARE

## 2017-12-26 ENCOUNTER — RADIANT APPOINTMENT (OUTPATIENT)
Dept: GENERAL RADIOLOGY | Facility: OTHER | Age: 37
End: 2017-12-26
Attending: FAMILY MEDICINE
Payer: MEDICARE

## 2017-12-26 VITALS
RESPIRATION RATE: 16 BRPM | OXYGEN SATURATION: 97 % | SYSTOLIC BLOOD PRESSURE: 118 MMHG | WEIGHT: 245 LBS | HEART RATE: 88 BPM | HEIGHT: 70 IN | BODY MASS INDEX: 35.07 KG/M2 | DIASTOLIC BLOOD PRESSURE: 74 MMHG | TEMPERATURE: 97.5 F

## 2017-12-26 DIAGNOSIS — R05.9 COUGH: ICD-10-CM

## 2017-12-26 DIAGNOSIS — R05.9 COUGH: Primary | ICD-10-CM

## 2017-12-26 DIAGNOSIS — J20.9 ACUTE BRONCHITIS, UNSPECIFIED ORGANISM: ICD-10-CM

## 2017-12-26 PROCEDURE — 71020 XR CHEST 2 VW: CPT | Mod: TC

## 2017-12-26 PROCEDURE — 99214 OFFICE O/P EST MOD 30 MIN: CPT | Performed by: FAMILY MEDICINE

## 2017-12-26 PROCEDURE — 99212 OFFICE O/P EST SF 10 MIN: CPT | Mod: 25

## 2017-12-26 RX ORDER — AZITHROMYCIN 250 MG/1
TABLET, FILM COATED ORAL
Qty: 6 TABLET | Refills: 0 | Status: SHIPPED | OUTPATIENT
Start: 2017-12-26 | End: 2018-01-09

## 2017-12-26 RX ORDER — ALBUTEROL SULFATE 90 UG/1
2 AEROSOL, METERED RESPIRATORY (INHALATION) EVERY 6 HOURS PRN
Qty: 1 INHALER | Refills: 0 | Status: SHIPPED | OUTPATIENT
Start: 2017-12-26 | End: 2018-07-30

## 2017-12-26 ASSESSMENT — PATIENT HEALTH QUESTIONNAIRE - PHQ9: SUM OF ALL RESPONSES TO PHQ QUESTIONS 1-9: 0

## 2017-12-26 ASSESSMENT — ANXIETY QUESTIONNAIRES
5. BEING SO RESTLESS THAT IT IS HARD TO SIT STILL: NOT AT ALL
1. FEELING NERVOUS, ANXIOUS, OR ON EDGE: NOT AT ALL
6. BECOMING EASILY ANNOYED OR IRRITABLE: NOT AT ALL
7. FEELING AFRAID AS IF SOMETHING AWFUL MIGHT HAPPEN: NOT AT ALL
GAD7 TOTAL SCORE: 0
2. NOT BEING ABLE TO STOP OR CONTROL WORRYING: NOT AT ALL
4. TROUBLE RELAXING: NOT AT ALL
3. WORRYING TOO MUCH ABOUT DIFFERENT THINGS: NOT AT ALL

## 2017-12-26 ASSESSMENT — PAIN SCALES - GENERAL: PAINLEVEL: MODERATE PAIN (4)

## 2017-12-26 NOTE — PROGRESS NOTES
SUBJECTIVE:  Casey Pedro, 37 year old, male is seen with the following medical problems.    Cough.  Casey has a persistent cough over the last 2 weeks.  Intermittently productive.  This is associated with a mild amount of wheezing.    Denies fever, shaking, chills, nausea, vomiting, or diarrhea.  No household contacts are ill.    Current Outpatient Prescriptions   Medication Sig Dispense Refill     fish oil-omega-3 fatty acids 1000 MG capsule Take two (2) capsules BY MOUTH daily 100 capsule 2     GNP ALL DAY ALLERGY 10 MG tablet take 1 tab every evenning 90 tablet 0     OLANZAPINE PO Take 5 mg by mouth 5mg tab dissolvable PRN       ibuprofen (ADVIL/MOTRIN) 800 MG tablet Take 1 tablet (800 mg) by mouth every 8 hours as needed for moderate pain 90 tablet 1     omeprazole (PRILOSEC) 40 MG capsule Take 1 capsule (40 mg) by mouth daily Take 30-60 minutes before a meal. 90 capsule 0     divalproex (DEPAKOTE) 500 MG EC tablet Take 500 mg by mouth 2 times daily       LORazepam (ATIVAN) 1 MG tablet Take 1 mg by mouth 2 times daily       OLANZapine (ZYPREXA PO) Take 5 mg by mouth At 11am       guanFACINE (TENEX) 1 MG tablet Take 1 tablet (1 mg) by mouth 2 times daily 60 tablet 10     OLANZapine (ZYPREXA) 20 MG tablet Take 20 mg by mouth daily Take with the 10mg.       OLANZapine (ZYPREXA) 10 MG tablet 10 mg Take one tablet (10mg) by oral route every day          No Known Allergies    Past Medical History:   Diagnosis Date     Autism 09/16/2011     BPH 09/16/2011     Mental retardation 09/16/2011     No past surgical history on file.  Family History   Problem Relation Age of Onset     Alcohol/Drug Father      alcoholism     Social History     Social History     Marital status: Single     Spouse name: N/A     Number of children: N/A     Years of education: N/A     Occupational History     Not on file.     Social History Main Topics     Smoking status: Never Smoker     Smokeless tobacco: Never Used      Comment: no passive  "exposure     Alcohol use No     Drug use: No     Sexual activity: No     Other Topics Concern     Caffeine Concern No     Exercise Yes     daily     Seat Belt Yes     Parent/Sibling W/ Cabg, Mi Or Angioplasty Before 65f 55m? No     unknown     Social History Narrative         Review Of Systems  Constitutional, HEENT, cardiovascular, pulmonary, gi and gu systems are negative, except as otherwise noted.      OBJECTIVE:/74 (BP Location: Right arm, Patient Position: Sitting, Cuff Size: Adult Large)  Pulse 88  Temp 97.5  F (36.4  C) (Tympanic)  Resp 16  Ht 5' 9.5\" (1.765 m)  Wt 245 lb (111.1 kg)  SpO2 97%  BMI 35.66 kg/m2      Exam:  Physical Exam   Constitutional: He is oriented to person, place, and time. He appears well-developed and well-nourished. No distress.   HENT:   Head: Normocephalic.   Nose: Nose normal. Right sinus exhibits no maxillary sinus tenderness and no frontal sinus tenderness. Left sinus exhibits no maxillary sinus tenderness and no frontal sinus tenderness.   Mouth/Throat: Uvula is midline and oropharynx is clear and moist. No oropharyngeal exudate or posterior oropharyngeal erythema.   There are cerumen occlusions bilaterally   Eyes: Conjunctivae are normal.   Neck: Neck supple.   Pulmonary/Chest: Effort normal and breath sounds normal.   Lymphadenopathy:     He has no cervical adenopathy.   Neurological: He is alert and oriented to person, place, and time.   Skin: Skin is warm and dry.   Psychiatric: He has a normal mood and affect.     Other exam not repeated    Labs:  Office Visit on 10/23/2017   Component Date Value Ref Range Status     Color Urine 10/23/2017 Yellow   Final     Appearance Urine 10/23/2017 Clear   Final     Glucose Urine 10/23/2017 Negative  NEG^Negative mg/dL Final     Bilirubin Urine 10/23/2017 Negative  NEG^Negative Final     Ketones Urine 10/23/2017 5* NEG^Negative mg/dL Final     Specific Gravity Urine 10/23/2017 1.016  1.003 - 1.035 Final     Blood Urine " 10/23/2017 Negative  NEG^Negative Final     pH Urine 10/23/2017 6.5  4.7 - 8.0 pH Final     Protein Albumin Urine 10/23/2017 Negative  NEG^Negative mg/dL Final     Urobilinogen mg/dL 10/23/2017 2.0  0.0 - 2.0 mg/dL Final     Nitrite Urine 10/23/2017 Negative  NEG^Negative Final     Leukocyte Esterase Urine 10/23/2017 Negative  NEG^Negative Final     Source 10/23/2017 Midstream Urine   Final       ASSESSMENT/PLAN:  Cough  Chest x ray reviewed and negative.  - XR CHEST 2 VW (Clinic Performed); Future    Acute bronchitis, unspecified organism  Azithromycin (Zithromax) as directed.  Albuterol HFA as needed for cough.  Follow up 2 weeks for cerumen removal.  - azithromycin (ZITHROMAX) 250 MG tablet; Two tablets first day, then one tablet daily for four days.  - albuterol (PROAIR HFA/PROVENTIL HFA/VENTOLIN HFA) 108 (90 BASE) MCG/ACT Inhaler; Inhale 2 puffs into the lungs every 6 hours as needed for shortness of breath / dyspnea or wheezing            Berny Velasquez MD

## 2017-12-26 NOTE — MR AVS SNAPSHOT
"              After Visit Summary   12/26/2017    Casey Pedro    MRN: [id number]           Patient Information     Date Of Birth          1980        Visit Information        Provider Department      12/26/2017 8:20 AM Berny Velasquez MD [facility]        Today's Diagnoses     Cough    -  1    Acute bronchitis, unspecified organism          Care Instructions    Take azithromycin (Zithromax) as directed          Follow-ups after your visit        Follow-up notes from your care team     Return in about 2 weeks (around 1/9/2018) for Follow Up Visit.      Your next 10 appointments already scheduled     Jan 09, 2018 11:00 AM CST   (Arrive by 10:45 AM)   SHORT with Berny Velasquez MD   [facility] [place] ([facility] )    [address]  [place] [address]   [phone]              Who to contact     If you have questions or need follow up information about today's clinic visit or your schedule please contact [facility] directly at [phone].  Normal or non-critical lab and imaging results will be communicated to you by [employer], letter or phone within 4 business days after the clinic has received the results. If you do not hear from us within 7 days, please contact the clinic through [employer] or phone. If you have a critical or abnormal lab result, we will notify you by phone as soon as possible.  Submit refill requests through [employer] or call your pharmacy and they will forward the refill request to us. Please allow 3 business days for your refill to be completed.          Additional Information About Your Visit        [employer] Information     [employer] lets you send messages to your doctor, view your test results, renew your prescriptions, schedule appointments and more. To sign up, go to [email or web address] . Click on \"Log in\" on the left side of the screen, which will take you to the Welcome page. Then click on \"Sign up Now\" on the right side " "of the page.     You will be asked to enter the access code listed below, as well as some personal information. Please follow the directions to create your username and password.     Your access code is: [email or web address]  Expires: 3/26/2018  9:12 AM     Your access code will  in 90 days. If you need help or a new code, please call your [facility] or [phone].        Care EveryWhere ID     This is your Care EveryWhere ID. This could be used by other organizations to access your [facility] medical records  [id number]        Your Vitals Were     Pulse Temperature Respirations Height Pulse Oximetry BMI (Body Mass Index)    88 97.5  F (36.4  C) (Tympanic) 16 5' 9.5\" (1.765 m) 97% 35.66 kg/m2       Blood Pressure from Last 3 Encounters:   17 118/74   10/23/17 118/64   17 116/82    Weight from Last 3 Encounters:   17 245 lb (111.1 kg)   10/23/17 244 lb (110.7 kg)   17 228 lb (103.4 kg)                 Today's Medication Changes          These changes are accurate as of: 17 11:59 PM.  If you have any questions, ask your nurse or doctor.               Start taking these medicines.        Dose/Directions    albuterol 108 (90 BASE) MCG/ACT Inhaler   Commonly known as:  PROAIR HFA/PROVENTIL HFA/VENTOLIN HFA   Used for:  Acute bronchitis, unspecified organism   Started by:  Berny Velasquez MD        Dose:  2 puff   Inhale 2 puffs into the lungs every 6 hours as needed for shortness of breath / dyspnea or wheezing   Quantity:  1 Inhaler   Refills:  0       azithromycin 250 MG tablet   Commonly known as:  ZITHROMAX   Used for:  Acute bronchitis, unspecified organism   Started by:  Berny Velasquez MD        Two tablets first day, then one tablet daily for four days.   Quantity:  6 tablet   Refills:  0            Where to get your medicines      These medications were sent to [facility] [place], [place]  [address] [address]     Phone:  [phone]     " albuterol 108 (90 BASE) MCG/ACT Inhaler    azithromycin 250 MG tablet                Primary Care Provider Office Phone # Fax #    Berny Velasquez [phone]       [facility] [address] [facility] [address]        Equal Access to Services     WU FARIAS : Hadii jorge ku hadasho Soomaali, waaxda luqadaha, qaybta kaalmada adeegyada, waxay shonnain haymaryn alyssa figueroaradhashanice castillo. So [facility] [phone].    ATENCIÓN: Si habla español, tiene a saunders disposición servicios gratuitos de asistencia lingüística. Llame al [phone].    We comply with applicable federal civil rights laws and [place] laws. We do not discriminate on the basis of race, color, national origin, age, disability, sex, sexual orientation, or gender identity.            Thank you!     Thank you for choosing [facility]  for your care. Our goal is always to provide you with excellent care. Hearing back from our patients is one way we can continue to improve our services. Please take a few minutes to complete the written survey that you may receive in the mail after your visit with us. Thank you!             Your Updated Medication List - Protect others around you: Learn how to safely use, store and throw away your medicines at www.disposemymeds.org.          This list is accurate as of: 12/26/17 11:59 PM.  Always use your most recent med list.                   Brand Name Dispense Instructions for use Diagnosis    albuterol 108 (90 BASE) MCG/ACT Inhaler    PROAIR HFA/PROVENTIL HFA/VENTOLIN HFA    1 Inhaler    Inhale 2 puffs into the lungs every 6 hours as needed for shortness of breath / dyspnea or wheezing    Acute bronchitis, unspecified organism       azithromycin 250 MG tablet    ZITHROMAX    6 tablet    Two tablets first day, then one tablet daily for four days.    Acute bronchitis, unspecified organism       divalproex 500 MG EC tablet    DEPAKOTE     Take 500 mg by mouth 2 times daily        fish oil-omega-3  fatty acids 1000 MG capsule     100 capsule    Take two (2) capsules BY MOUTH daily    Health care maintenance       GNP ALL DAY ALLERGY 10 MG tablet   Generic drug:  cetirizine     90 tablet    take 1 tab every evenning    Chronic seasonal allergic rhinitis, unspecified trigger       guanFACINE 1 MG tablet    TENEX    60 tablet    Take 1 tablet (1 mg) by mouth 2 times daily    Diagnosis unknown       ibuprofen 800 MG tablet    ADVIL/MOTRIN    90 tablet    Take 1 tablet (800 mg) by mouth every 8 hours as needed for moderate pain    Acute pain of right shoulder       LORazepam 1 MG tablet    ATIVAN     Take 1 mg by mouth 2 times daily        omeprazole 40 MG capsule    priLOSEC    90 capsule    Take 1 capsule (40 mg) by mouth daily Take 30-60 minutes before a meal.    Gastroesophageal reflux disease without esophagitis, Disruptive behavior disorder       * zyPREXA 10 MG tablet   Generic drug:  OLANZapine      10 mg Take one tablet (10mg) by oral route every day        * ZYPREXA 20 MG tablet   Generic drug:  OLANZapine      Take 20 mg by mouth daily Take with the 10mg.        * ZYPREXA PO      Take 5 mg by mouth At 11am    Disruptive behavior disorder       * OLANZAPINE PO      Take 5 mg by mouth 5mg tab dissolvable PRN        * Notice:  This list has 4 medication(s) that are the same as other medications prescribed for you. Read the directions carefully, and ask your doctor or other care provider to review them with you.

## 2017-12-27 ASSESSMENT — ANXIETY QUESTIONNAIRES: GAD7 TOTAL SCORE: 0

## 2018-01-09 ENCOUNTER — OFFICE VISIT (OUTPATIENT)
Dept: FAMILY MEDICINE | Facility: OTHER | Age: 38
End: 2018-01-09
Attending: FAMILY MEDICINE
Payer: MEDICARE

## 2018-01-09 VITALS
SYSTOLIC BLOOD PRESSURE: 106 MMHG | RESPIRATION RATE: 20 BRPM | TEMPERATURE: 96.9 F | OXYGEN SATURATION: 96 % | BODY MASS INDEX: 35.16 KG/M2 | WEIGHT: 245.6 LBS | HEIGHT: 70 IN | HEART RATE: 87 BPM | DIASTOLIC BLOOD PRESSURE: 78 MMHG

## 2018-01-09 DIAGNOSIS — H61.23 BILATERAL IMPACTED CERUMEN: ICD-10-CM

## 2018-01-09 DIAGNOSIS — F91.9 DISRUPTIVE BEHAVIOR DISORDER: ICD-10-CM

## 2018-01-09 DIAGNOSIS — J20.9 ACUTE BRONCHITIS, UNSPECIFIED ORGANISM: Primary | ICD-10-CM

## 2018-01-09 LAB
ANION GAP SERPL CALCULATED.3IONS-SCNC: 9 MMOL/L (ref 3–14)
BUN SERPL-MCNC: 11 MG/DL (ref 7–30)
CALCIUM SERPL-MCNC: 9.2 MG/DL (ref 8.5–10.1)
CHLORIDE SERPL-SCNC: 107 MMOL/L (ref 94–109)
CO2 SERPL-SCNC: 25 MMOL/L (ref 20–32)
CREAT SERPL-MCNC: 0.62 MG/DL (ref 0.66–1.25)
GFR SERPL CREATININE-BSD FRML MDRD: >90 ML/MIN/1.7M2
GLUCOSE SERPL-MCNC: 93 MG/DL (ref 70–99)
MAGNESIUM SERPL-MCNC: 2.1 MG/DL (ref 1.6–2.3)
POTASSIUM SERPL-SCNC: 4.2 MMOL/L (ref 3.4–5.3)
PROLACTIN SERPL-MCNC: 26 UG/L (ref 2–18)
SODIUM SERPL-SCNC: 141 MMOL/L (ref 133–144)
TSH SERPL DL<=0.005 MIU/L-ACNC: 1.8 MU/L (ref 0.4–4)
VIT B12 SERPL-MCNC: 814 PG/ML (ref 193–986)

## 2018-01-09 PROCEDURE — 84443 ASSAY THYROID STIM HORMONE: CPT | Mod: DBP,ZL | Performed by: FAMILY MEDICINE

## 2018-01-09 PROCEDURE — G0463 HOSPITAL OUTPT CLINIC VISIT: HCPCS

## 2018-01-09 PROCEDURE — 99213 OFFICE O/P EST LOW 20 MIN: CPT | Performed by: FAMILY MEDICINE

## 2018-01-09 PROCEDURE — 82607 VITAMIN B-12: CPT | Mod: ZL | Performed by: FAMILY MEDICINE

## 2018-01-09 PROCEDURE — 36415 COLL VENOUS BLD VENIPUNCTURE: CPT | Mod: ZL | Performed by: FAMILY MEDICINE

## 2018-01-09 PROCEDURE — 80048 BASIC METABOLIC PNL TOTAL CA: CPT | Mod: ZL | Performed by: FAMILY MEDICINE

## 2018-01-09 PROCEDURE — 83735 ASSAY OF MAGNESIUM: CPT | Mod: ZL | Performed by: FAMILY MEDICINE

## 2018-01-09 PROCEDURE — 82306 VITAMIN D 25 HYDROXY: CPT | Mod: DBP,ZL | Performed by: FAMILY MEDICINE

## 2018-01-09 PROCEDURE — 84146 ASSAY OF PROLACTIN: CPT | Mod: ZL | Performed by: FAMILY MEDICINE

## 2018-01-09 ASSESSMENT — PAIN SCALES - GENERAL: PAINLEVEL: NO PAIN (0)

## 2018-01-09 NOTE — LETTER
Dear Casey,  Here is a copy of your lab results on 01/09/2018 for your review. Please call [phone] if you have any questions.  Results for orders placed or performed in visit on 01/09/18   TSH with free T4 reflex   Result Value Ref Range    TSH 1.80 0.40 - 4.00 mU/L   Vitamin D Deficiency   Result Value Ref Range    Vitamin D Deficiency screening 12 (L) 20 - 75 ug/L   Prolactin   Result Value Ref Range    Prolactin 26 (H) 2 - 18 ug/L   Basic metabolic panel   Result Value Ref Range    Sodium 141 133 - 144 mmol/L    Potassium 4.2 3.4 - 5.3 mmol/L    Chloride 107 94 - 109 mmol/L    Carbon Dioxide 25 20 - 32 mmol/L    Anion Gap 9 3 - 14 mmol/L    Glucose 93 70 - 99 mg/dL    Urea Nitrogen 11 7 - 30 mg/dL    Creatinine 0.62 (L) 0.66 - 1.25 mg/dL    GFR Estimate >90 >60 mL/min/1.7m2    GFR Estimate If Black >90 >60 mL/min/1.7m2    Calcium 9.2 8.5 - 10.1 mg/dL   Magnesium   Result Value Ref Range    Magnesium 2.1 1.6 - 2.3 mg/dL   Vitamin B12   Result Value Ref Range    Vitamin B12 814 193 - 986 pg/mL     Dr. Berny Velasquez

## 2018-01-09 NOTE — MR AVS SNAPSHOT
"              After Visit Summary   1/9/2018    Casey Pedro    MRN: [id number]           Patient Information     Date Of Birth          1980        Visit Information        Provider Department      1/9/2018 11:00 AM Berny Velasquez MD [facility]        Today's Diagnoses     Acute bronchitis, unspecified organism    -  1    Bilateral impacted cerumen        Disruptive behavior disorder          Care Instructions    Continue same medications.            Follow-ups after your visit        Follow-up notes from your care team     Return if symptoms worsen or fail to improve.      Who to contact     If you have questions or need follow up information about today's clinic visit or your schedule please contact [facility] directly at [phone].  Normal or non-critical lab and imaging results will be communicated to you by MyChart, letter or phone within 4 business days after the clinic has received the results. If you do not hear from us within 7 days, please contact the clinic through MyChart or phone. If you have a critical or abnormal lab result, we will notify you by phone as soon as possible.  Submit refill requests through [employer] or call your pharmacy and they will forward the refill request to us. Please allow 3 business days for your refill to be completed.          Additional Information About Your Visit        MyChart Information     [employer] lets you send messages to your doctor, view your test results, renew your prescriptions, schedule appointments and more. To sign up, go to [email or web address] . Click on \"Log in\" on the left side of the screen, which will take you to the Welcome page. Then click on \"Sign up Now\" on the right side of the page.     You will be asked to enter the access code listed below, as well as some personal information. Please follow the directions to create your username and password.     Your access code is: [email or web address]  Expires: 3/26/2018  " "9:12 AM     Your access code will  in 90 days. If you need help or a new code, please call your [place] clinic or [phone].        Care EveryWhere ID     This is your Care EveryWhere ID. This could be used by other organizations to access your [place] medical records  [id number]        Your Vitals Were     Pulse Temperature Respirations Height Pulse Oximetry BMI (Body Mass Index)    87 96.9  F (36.1  C) (Tympanic) 20 5' 9.5\" (1.765 m) 96% 35.75 kg/m2       Blood Pressure from Last 3 Encounters:   18 106/78   17 118/74   10/23/17 118/64    Weight from Last 3 Encounters:   18 245 lb 9.6 oz (111.4 kg)   17 245 lb (111.1 kg)   10/23/17 244 lb (110.7 kg)              We Performed the Following     Basic metabolic panel     Magnesium     Prolactin     TSH with free T4 reflex     Vitamin B12     Vitamin D Deficiency          Where to get your medicines      [facility] [address]     Phone:  [phone]     omeprazole 40 MG capsule          Primary Care Provider Office Phone # Fax #    Berny Velasquez [phone]       [facility] [address] [place] [id number]        Equal Access to Services     [facility] Hospital[facility] AH: Hadii jorge vazquez hadasho Soeveali, waaxda luqadaha, qaybta kaalmada ademoniqueyada, brent magallon . So [facility] [phone].    ATENCIÓN: Si habla español, tiene a saunders disposición servicios gratuitos de asistencia lingüística. Llame al [phone].    We comply with applicable federal civil rights laws and [place] laws. We do not discriminate on the basis of race, color, national origin, age, disability, sex, sexual orientation, or gender identity.            Thank you!     Thank you for choosing [facility]  for your care. Our goal is always to provide you with excellent care. Hearing back from our patients is one way we can " continue to improve our services. Please take a few minutes to complete the written survey that you may receive in the mail after your visit with us. Thank you!             Your Updated Medication List - Protect others around you: Learn how to safely use, store and throw away your medicines at www.disposemymeds.org.          This list is accurate as of: 1/9/18 11:59 PM.  Always use your most recent med list.                   Brand Name Dispense Instructions for use Diagnosis    albuterol 108 (90 BASE) MCG/ACT Inhaler    PROAIR HFA/PROVENTIL HFA/VENTOLIN HFA    1 Inhaler    Inhale 2 puffs into the lungs every 6 hours as needed for shortness of breath / dyspnea or wheezing    Acute bronchitis, unspecified organism       divalproex 500 MG EC tablet    DEPAKOTE     Take 500 mg by mouth 2 times daily        fish oil-omega-3 fatty acids 1000 MG capsule     100 capsule    Take two (2) capsules BY MOUTH daily    Health care maintenance       GNP ALL DAY ALLERGY 10 MG tablet   Generic drug:  cetirizine     90 tablet    take 1 tab every evenning    Chronic seasonal allergic rhinitis, unspecified trigger       guanFACINE 1 MG tablet    TENEX    60 tablet    Take 1 tablet (1 mg) by mouth 2 times daily    Diagnosis unknown       ibuprofen 800 MG tablet    ADVIL/MOTRIN    90 tablet    Take 1 tablet (800 mg) by mouth every 8 hours as needed for moderate pain    Acute pain of right shoulder       LORazepam 1 MG tablet    ATIVAN     Take 1 mg by mouth 2 times daily        omeprazole 40 MG capsule    priLOSEC    30 capsule    Take 1 capsule (40 mg) by mouth daily Take 30-60 minutes before a meal.    Gastroesophageal reflux disease without esophagitis, Disruptive behavior disorder       * zyPREXA 10 MG tablet   Generic drug:  OLANZapine      10 mg Take one tablet (10mg) by oral route every day        * ZYPREXA 20 MG tablet   Generic drug:  OLANZapine      Take 20 mg by mouth daily Take with the 10mg.        * ZYPREXA PO      Take 5  mg by mouth At 11am    Disruptive behavior disorder       * OLANZAPINE PO      Take 5 mg by mouth 5mg tab dissolvable PRN        * Notice:  This list has 4 medication(s) that are the same as other medications prescribed for you. Read the directions carefully, and ask your doctor or other care provider to review them with you.

## 2018-01-09 NOTE — NURSING NOTE
Cerumenosis is noted.  Wax was removed in bilateral ears  by syringing and manual debridement. Instructions for home care to prevent wax buildup are given.  Pamela M Lechevalier LPN

## 2018-01-09 NOTE — NURSING NOTE
"Chief Complaint   Patient presents with     RECHECK     followu cold trouble breathing and ear cleaning        Initial /78 (BP Location: Left arm, Patient Position: Chair, Cuff Size: Adult Large)  Pulse 87  Temp 96.9  F (36.1  C) (Tympanic)  Resp 20  Ht 5' 9.5\" (1.765 m)  Wt 245 lb 9.6 oz (111.4 kg)  SpO2 96%  BMI 35.75 kg/m2 Estimated body mass index is 35.75 kg/(m^2) as calculated from the following:    Height as of this encounter: 5' 9.5\" (1.765 m).    Weight as of this encounter: 245 lb 9.6 oz (111.4 kg).  Medication Reconciliation: complete   Pamela M Lechevalier LPN      "

## 2018-01-09 NOTE — PROGRESS NOTES
SUBJECTIVE:  Casey Vargasmi, 37 year old, male is seen with the following medical problems.    Cough.  Casey has a persistent cough over the last 2 weeks.  Intermittently productive.  This is associated with a mild amount of wheezing. His symptoms are improved.    Cerumen impaction. He was noted to have bilateral cerumen occlusions.  T    Denies fever, shaking, chills, nausea, vomiting, or diarrhea.  No household contacts are ill.    Current Outpatient Prescriptions   Medication Sig Dispense Refill     omeprazole (PRILOSEC) 40 MG capsule Take 1 capsule (40 mg) by mouth daily Take 30-60 minutes before a meal. 30 capsule 2     albuterol (PROAIR HFA/PROVENTIL HFA/VENTOLIN HFA) 108 (90 BASE) MCG/ACT Inhaler Inhale 2 puffs into the lungs every 6 hours as needed for shortness of breath / dyspnea or wheezing 1 Inhaler 0     fish oil-omega-3 fatty acids 1000 MG capsule Take two (2) capsules BY MOUTH daily 100 capsule 2     GNP ALL DAY ALLERGY 10 MG tablet take 1 tab every evenning 90 tablet 0     OLANZAPINE PO Take 5 mg by mouth 5mg tab dissolvable PRN       ibuprofen (ADVIL/MOTRIN) 800 MG tablet Take 1 tablet (800 mg) by mouth every 8 hours as needed for moderate pain 90 tablet 1     divalproex (DEPAKOTE) 500 MG EC tablet Take 500 mg by mouth 2 times daily       LORazepam (ATIVAN) 1 MG tablet Take 1 mg by mouth 2 times daily       OLANZapine (ZYPREXA PO) Take 5 mg by mouth At 11am       guanFACINE (TENEX) 1 MG tablet Take 1 tablet (1 mg) by mouth 2 times daily 60 tablet 10     OLANZapine (ZYPREXA) 20 MG tablet Take 20 mg by mouth daily Take with the 10mg.       OLANZapine (ZYPREXA) 10 MG tablet 10 mg Take one tablet (10mg) by oral route every day          No Known Allergies    Past Medical History:   Diagnosis Date     Autism 09/16/2011     BPH 09/16/2011     Mental retardation 09/16/2011     No past surgical history on file.  Family History   Problem Relation Age of Onset     Alcohol/Drug Father      alcoholism     Social  "History     Social History     Marital status: Single     Spouse name: N/A     Number of children: N/A     Years of education: N/A     Occupational History     Not on file.     Social History Main Topics     Smoking status: Never Smoker     Smokeless tobacco: Never Used      Comment: no passive exposure     Alcohol use No     Drug use: No     Sexual activity: No     Other Topics Concern     Caffeine Concern No     Exercise Yes     daily     Seat Belt Yes     Parent/Sibling W/ Cabg, Mi Or Angioplasty Before 65f 55m? No     unknown     Social History Narrative         Review Of Systems  Constitutional, HEENT, cardiovascular, pulmonary, gi and gu systems are negative, except as otherwise noted.      OBJECTIVE:/78 (BP Location: Left arm, Patient Position: Chair, Cuff Size: Adult Large)  Pulse 87  Temp 96.9  F (36.1  C) (Tympanic)  Resp 20  Ht 5' 9.5\" (1.765 m)  Wt 245 lb 9.6 oz (111.4 kg)  SpO2 96%  BMI 35.75 kg/m2      Exam:  Physical Exam   Constitutional: He is oriented to person, place, and time. He appears well-developed and well-nourished. No distress.   HENT:   Head: Normocephalic.   Nose: Nose normal. Right sinus exhibits no maxillary sinus tenderness and no frontal sinus tenderness. Left sinus exhibits no maxillary sinus tenderness and no frontal sinus tenderness.   Mouth/Throat: Uvula is midline and oropharynx is clear and moist. No oropharyngeal exudate or posterior oropharyngeal erythema.   There are cerumen occlusions bilaterally   Eyes: Conjunctivae are normal.   Neck: Neck supple.   Pulmonary/Chest: Effort normal and breath sounds normal.   Lymphadenopathy:     He has no cervical adenopathy.   Neurological: He is alert and oriented to person, place, and time.   Skin: Skin is warm and dry.   Psychiatric: He has a normal mood and affect.     Other exam not repeated    Labs:  Office Visit on 10/23/2017   Component Date Value Ref Range Status     Color Urine 10/23/2017 Yellow   Final     " Appearance Urine 10/23/2017 Clear   Final     Glucose Urine 10/23/2017 Negative  NEG^Negative mg/dL Final     Bilirubin Urine 10/23/2017 Negative  NEG^Negative Final     Ketones Urine 10/23/2017 5* NEG^Negative mg/dL Final     Specific Gravity Urine 10/23/2017 1.016  1.003 - 1.035 Final     Blood Urine 10/23/2017 Negative  NEG^Negative Final     pH Urine 10/23/2017 6.5  4.7 - 8.0 pH Final     Protein Albumin Urine 10/23/2017 Negative  NEG^Negative mg/dL Final     Urobilinogen mg/dL 10/23/2017 2.0  0.0 - 2.0 mg/dL Final     Nitrite Urine 10/23/2017 Negative  NEG^Negative Final     Leukocyte Esterase Urine 10/23/2017 Negative  NEG^Negative Final     Source 10/23/2017 Midstream Urine   Final       ASSESSMENT/PLAN:  Acute bronchitis, unspecified organism  Improved.      Bilateral impacted cerumen  Ear lavage until clear.    Disruptive behavior disorder  Labs are drawn.  - TSH with free T4 reflex  - Vitamin D Deficiency  - Prolactin  - Basic metabolic panel  - Magnesium  - Vitamin B12             Berny Velasquez MD

## 2018-01-10 LAB — DEPRECATED CALCIDIOL+CALCIFEROL SERPL-MC: 12 UG/L (ref 20–75)

## 2018-01-18 ENCOUNTER — TELEPHONE (OUTPATIENT)
Dept: FAMILY MEDICINE | Facility: OTHER | Age: 38
End: 2018-01-18

## 2018-01-18 NOTE — TELEPHONE ENCOUNTER
9:34 AM    Reason for Call: Phone Call    Description: Gerda from [facility] called and states that she would like to see if she could get [facility] lab results from the past 6 months faxed over to them at [phone].    Was an appointment offered for this call? No  If yes : Appointment type              Date    Preferred method for responding to this message: Telephone Call  What is your phone number ?    If we cannot reach you directly, may we leave a detailed response at the number you provided? Yes    Can this message wait until your PCP/provider returns, if available today? Not applicable, PCP is in     Evie Allred

## 2018-01-19 ENCOUNTER — TELEPHONE (OUTPATIENT)
Dept: FAMILY MEDICINE | Facility: OTHER | Age: 38
End: 2018-01-19

## 2018-01-19 DIAGNOSIS — E55.9 VITAMIN D DEFICIENCY: Primary | ICD-10-CM

## 2018-01-19 RX ORDER — CHOLECALCIFEROL (VITAMIN D3) 50 MCG
2000 TABLET ORAL DAILY
Qty: 90 TABLET | Refills: 0 | Status: SHIPPED | OUTPATIENT
Start: 2018-01-19 | End: 2018-04-09

## 2018-01-19 RX ORDER — ERGOCALCIFEROL 1.25 MG/1
50000 CAPSULE, LIQUID FILLED ORAL
Qty: 8 CAPSULE | Refills: 0 | Status: SHIPPED | OUTPATIENT
Start: 2018-01-19 | End: 2018-03-10

## 2018-01-19 NOTE — TELEPHONE ENCOUNTER
8:40 AM    Reason for Call: Phone Call    Description: Gerda from [facility] called and states that she would like to try to get a call regarding his lab results     Was an appointment offered for this call? No  If yes : Appointment type              Date    Preferred method for responding to this message: Telephone Call  What is your phone number ?    If we cannot reach you directly, may we leave a detailed response at the number you provided? Yes    Can this message wait until your PCP/provider returns, if available today? Not applicable, PCP is in     Evie Allred

## 2018-01-19 NOTE — TELEPHONE ENCOUNTER
[facility] is requesting something for vitamin d deficiency according to labs. Also wondering if you want something for the elevated prolactin.

## 2018-01-22 DIAGNOSIS — J30.2 CHRONIC SEASONAL ALLERGIC RHINITIS, UNSPECIFIED TRIGGER: ICD-10-CM

## 2018-01-22 RX ORDER — CETIRIZINE HYDROCHLORIDE 10 MG/1
TABLET, FILM COATED ORAL
Qty: 90 TABLET | Refills: 0 | Status: SHIPPED | OUTPATIENT
Start: 2018-01-22 | End: 2018-04-23

## 2018-01-29 ENCOUNTER — TRANSFERRED RECORDS (OUTPATIENT)
Dept: HEALTH INFORMATION MANAGEMENT | Facility: HOSPITAL | Age: 38
End: 2018-01-29

## 2018-02-05 ENCOUNTER — TELEPHONE (OUTPATIENT)
Dept: FAMILY MEDICINE | Facility: OTHER | Age: 38
End: 2018-02-05

## 2018-02-05 NOTE — TELEPHONE ENCOUNTER
11:24 AM    Reason for Call: OVERBOOK    Patient is having the following symptoms: Poss constipation for 1-2 weeks.    The patient is requesting an appointment for overbook tomorrow (02-06-18) around 3:00 with Dr Velasquez.    Was an appointment offered for this call? Yes  If yes : Appointment type              Date Scheduled appointment for Monday 2-12-18, but would like to try to get in sooner. Due to the nurses' ([facility]) schedule and when Dr Velasquez is in, the only time that would really work this week is tomorrow around 3:00 pm    Preferred method for responding to this message: Telephone Call  What is your phone number ? [phone] [facility] - [facility]    If we cannot reach you directly, may we leave a detailed response at the number you provided? Yes    Can this message wait until your PCP/provider returns, if unavailable today? YES,      Melanie Pacheco

## 2018-02-06 ENCOUNTER — RADIANT APPOINTMENT (OUTPATIENT)
Dept: GENERAL RADIOLOGY | Facility: OTHER | Age: 38
End: 2018-02-06
Attending: FAMILY MEDICINE
Payer: MEDICARE

## 2018-02-06 ENCOUNTER — OFFICE VISIT (OUTPATIENT)
Dept: FAMILY MEDICINE | Facility: OTHER | Age: 38
End: 2018-02-06
Attending: FAMILY MEDICINE
Payer: MEDICARE

## 2018-02-06 VITALS — DIASTOLIC BLOOD PRESSURE: 82 MMHG | HEART RATE: 92 BPM | OXYGEN SATURATION: 97 % | SYSTOLIC BLOOD PRESSURE: 120 MMHG

## 2018-02-06 DIAGNOSIS — K59.01 SLOW TRANSIT CONSTIPATION: ICD-10-CM

## 2018-02-06 DIAGNOSIS — K59.01 SLOW TRANSIT CONSTIPATION: Primary | ICD-10-CM

## 2018-02-06 PROCEDURE — G0463 HOSPITAL OUTPT CLINIC VISIT: HCPCS

## 2018-02-06 PROCEDURE — 99213 OFFICE O/P EST LOW 20 MIN: CPT | Performed by: FAMILY MEDICINE

## 2018-02-06 PROCEDURE — 74019 RADEX ABDOMEN 2 VIEWS: CPT | Mod: TC

## 2018-02-06 RX ORDER — MAGNESIUM CARB/ALUMINUM HYDROX 105-160MG
296 TABLET,CHEWABLE ORAL ONCE
Qty: 296 ML | Refills: 0 | Status: SHIPPED | OUTPATIENT
Start: 2018-02-06 | End: 2018-02-06

## 2018-02-06 RX ORDER — DOCUSATE SODIUM 100 MG/1
100 CAPSULE, LIQUID FILLED ORAL 2 TIMES DAILY
Qty: 60 CAPSULE | Refills: 3 | Status: SHIPPED | OUTPATIENT
Start: 2018-02-06 | End: 2018-05-14

## 2018-02-06 ASSESSMENT — PAIN SCALES - GENERAL: PAINLEVEL: MODERATE PAIN (4)

## 2018-02-06 NOTE — NURSING NOTE
"Chief Complaint   Patient presents with     Other     Constipation       Initial /82  Pulse 92  SpO2 97% Estimated body mass index is 35.75 kg/(m^2) as calculated from the following:    Height as of 1/9/18: 5' 9.5\" (1.765 m).    Weight as of 1/9/18: 245 lb 9.6 oz (111.4 kg).  Medication Reconciliation: complete   ABBI CASTILLO LPN  "

## 2018-02-06 NOTE — MR AVS SNAPSHOT
"              After Visit Summary   2/6/2018    Casey Pedro    MRN: [id number]           Patient Information     Date Of Birth          1980        Visit Information        Provider Department      2/6/2018 2:00 PM Berny Velasquez MD [facility]        Today's Diagnoses     Slow transit constipation    -  1      Care Instructions    Give magnesium citrate once.   mg twice daily          Follow-ups after your visit        Follow-up notes from your care team     Return in about 1 month (around 3/6/2018) for Follow Up Visit.      Your next 10 appointments already scheduled     Feb 20, 2018  9:40 AM CST   (Arrive by 9:25 AM)   SHORT with Berny Velasquez MD   [facility] [place] ([facility] [place] )    [address]  [place] [address]   [phone]              Who to contact     If you have questions or need follow up information about today's clinic visit or your schedule please contact [facility] directly at [phone].  Normal or non-critical lab and imaging results will be communicated to you by [facility], letter or phone within 4 business days after the clinic has received the results. If you do not hear from us within 7 days, please contact the clinic through [facility]ap[facility] or phone. If you have a critical or abnormal lab result, we will notify you by phone as soon as possible.  Submit refill requests through Shopseen or call your pharmacy and they will forward the refill request to us. Please allow 3 business days for your refill to be completed.          Additional Information About Your Visit        [facility] Information     [facility]seen lets you send messages to your doctor, view your test results, renew your prescriptions, schedule appointments and more. To sign up, go to [email or web address] . Click on \"Log in\" on the left side of the screen, which will take you to the Welcome page. Then click on \"Sign up Now\" on the right side of the page.     You " will be asked to enter the access code listed below, as well as some personal information. Please follow the directions to create your username and password.     Your access code is: [email or web address]  Expires: 3/26/2018  9:12 AM     Your access code will  in 90 days. If you need help or a new code, please call your [facility] clinic or [phone].        Care EveryWhere ID     This is your Care EveryWhere ID. This could be used by other organizations to access your [facility] medical records  [id number]        Your Vitals Were     Pulse Pulse Oximetry                92 97%           Blood Pressure from Last 3 Encounters:   18 120/82   18 106/78   17 118/74    Weight from Last 3 Encounters:   18 245 lb 9.6 oz (111.4 kg)   17 245 lb (111.1 kg)   10/23/17 244 lb (110.7 kg)                 Today's Medication Changes          These changes are accurate as of 18 11:59 PM.  If you have any questions, ask your nurse or doctor.               Start taking these medicines.        Dose/Directions    docusate sodium 100 MG capsule   Commonly known as:  COLACE   Used for:  Slow transit constipation   Started by:  Berny Velasquez MD        Dose:  100 mg   Take 1 capsule (100 mg) by mouth 2 times daily   Quantity:  60 capsule   Refills:  3       magnesium citrate 1.745 GM/30ML solution   Commonly known as:  [employer] MAGNESIUM CITRATE   Used for:  Slow transit constipation   Started by:  Berny Velasquez MD        Dose:  296 mL   Take 296 mLs by mouth once for 1 dose   Quantity:  296 mL   Refills:  0         Stop taking these medicines if you haven't already. Please contact your care team if you have questions.     ibuprofen 800 MG tablet   Commonly known as:  ADVIL/MOTRIN   Stopped by:  Berny Velasquez MD                Where to get your medicines      These medications were sent to [facility], [address] [address]     Phone:  [phone]      docusate sodium 100 MG capsule    magnesium citrate 1.745 GM/30ML solution                Primary Care Provider Office Phone # Fax #    Berny Velasquez [phone] [address] [facility] [address]        Equal Access to Services     MAHNAZWU JARRETT : Hadii jorge vazquez calvino Soeveali, waaxda luqadaha, qaybta kaalmada adeegyada, brent rosa rhodaemma shah demetrioshanice castillo. So [facility] [phone].    ATENCIÓN: Si habla español, tiene a saunders disposición servicios gratuitos de asistencia lingüística. Llame al [phone].    We comply with applicable federal civil rights laws and [place] laws. We do not discriminate on the basis of race, color, national origin, age, disability, sex, sexual orientation, or gender identity.            Thank you!     Thank you for choosing [facility]  for your care. Our goal is always to provide you with excellent care. Hearing back from our patients is one way we can continue to improve our services. Please take a few minutes to complete the written survey that you may receive in the mail after your visit with us. Thank you!             Your Updated Medication List - Protect others around you: Learn how to safely use, store and throw away your medicines at www.disposemymeds.org.          This list is accurate as of 2/6/18 11:59 PM.  Always use your most recent med list.                   Brand Name Dispense Instructions for use Diagnosis    albuterol 108 (90 BASE) MCG/ACT Inhaler    PROAIR HFA/PROVENTIL HFA/VENTOLIN HFA    1 Inhaler    Inhale 2 puffs into the lungs every 6 hours as needed for shortness of breath / dyspnea or wheezing    Acute bronchitis, unspecified organism       ALL DAY ALLERGY 10 MG tablet   Generic drug:  cetirizine     90 tablet    Take one (1) tablet BY MOUTH daily    Chronic seasonal allergic rhinitis, unspecified trigger       divalproex sodium delayed-release 500 MG DR tablet    DEPAKOTE     Take 500 mg by mouth 2 times daily         docusate sodium 100 MG capsule    COLACE    60 capsule    Take 1 capsule (100 mg) by mouth 2 times daily    Slow transit constipation       fish oil-omega-3 fatty acids 1000 MG capsule     100 capsule    Take two (2) capsules BY MOUTH daily    Health care maintenance       guanFACINE 1 MG tablet    TENEX    60 tablet    Take 1 tablet (1 mg) by mouth 2 times daily    Diagnosis unknown       LORazepam 1 MG tablet    ATIVAN     Take 1 mg by mouth 2 times daily        magnesium citrate 1.745 GM/30ML solution    CVS MAGNESIUM CITRATE    296 mL    Take 296 mLs by mouth once for 1 dose    Slow transit constipation       omeprazole 40 MG capsule    priLOSEC    30 capsule    Take 1 capsule (40 mg) by mouth daily Take 30-60 minutes before a meal.    Gastroesophageal reflux disease without esophagitis, Disruptive behavior disorder       vitamin D 2000 UNITS tablet     90 tablet    Take 2,000 Units by mouth daily    Vitamin D deficiency       vitamin D 26405 UNIT capsule    ERGOCALCIFEROL    8 capsule    Take 1 capsule (50,000 Units) by mouth every 7 days for 8 doses    Vitamin D deficiency       * zyPREXA 10 MG tablet   Generic drug:  OLANZapine      10 mg Take one tablet (10mg) by oral route every day        * ZYPREXA 20 MG tablet   Generic drug:  OLANZapine      Take 20 mg by mouth daily Take with the 10mg.        * ZYPREXA PO      Take 5 mg by mouth At 11am    Disruptive behavior disorder       * OLANZAPINE PO      Take 5 mg by mouth 5mg tab dissolvable PRN        * Notice:  This list has 4 medication(s) that are the same as other medications prescribed for you. Read the directions carefully, and ask your doctor or other care provider to review them with you.

## 2018-02-06 NOTE — PROGRESS NOTES
SUBJECTIVE:  Casey Pedro, 37 year old, male is seen with the following medical problems.    Constipation.  Staff has noted.increasing difficulties with constipation.  This has been present over the last several months. He will go several days without a bowel movement and is assocated with behaviors.    Denies fever, shaking, chills, dysphagia, change in appetite, weight loss, nausea, vomiting, diarrhea, melena, or hematochezia.  No change in bowel habits.    Current Outpatient Prescriptions   Medication Sig Dispense Refill     ALL DAY ALLERGY 10 MG tablet Take one (1) tablet BY MOUTH daily 90 tablet 0     vitamin D (ERGOCALCIFEROL) 33186 UNIT capsule Take 1 capsule (50,000 Units) by mouth every 7 days for 8 doses 8 capsule 0     Cholecalciferol (VITAMIN D) 2000 UNITS tablet Take 2,000 Units by mouth daily 90 tablet 0     omeprazole (PRILOSEC) 40 MG capsule Take 1 capsule (40 mg) by mouth daily Take 30-60 minutes before a meal. 30 capsule 2     albuterol (PROAIR HFA/PROVENTIL HFA/VENTOLIN HFA) 108 (90 BASE) MCG/ACT Inhaler Inhale 2 puffs into the lungs every 6 hours as needed for shortness of breath / dyspnea or wheezing 1 Inhaler 0     fish oil-omega-3 fatty acids 1000 MG capsule Take two (2) capsules BY MOUTH daily 100 capsule 2     OLANZAPINE PO Take 5 mg by mouth 5mg tab dissolvable PRN       divalproex (DEPAKOTE) 500 MG EC tablet Take 500 mg by mouth 2 times daily       LORazepam (ATIVAN) 1 MG tablet Take 1 mg by mouth 2 times daily       OLANZapine (ZYPREXA PO) Take 5 mg by mouth At 11am       guanFACINE (TENEX) 1 MG tablet Take 1 tablet (1 mg) by mouth 2 times daily 60 tablet 10     OLANZapine (ZYPREXA) 20 MG tablet Take 20 mg by mouth daily Take with the 10mg.       OLANZapine (ZYPREXA) 10 MG tablet 10 mg Take one tablet (10mg) by oral route every day          No Known Allergies    Past Medical History:   Diagnosis Date     Autism 09/16/2011     BPH 09/16/2011     Mental retardation 09/16/2011     History  reviewed. No pertinent surgical history.  Family History   Problem Relation Age of Onset     Alcohol/Drug Father      alcoholism     Social History     Social History     Marital status: Single     Spouse name: N/A     Number of children: N/A     Years of education: N/A     Occupational History     Not on file.     Social History Main Topics     Smoking status: Never Smoker     Smokeless tobacco: Never Used      Comment: no passive exposure     Alcohol use No     Drug use: No     Sexual activity: No     Other Topics Concern     Caffeine Concern No     Exercise Yes     daily     Seat Belt Yes     Parent/Sibling W/ Cabg, Mi Or Angioplasty Before 65f 55m? No     unknown     Social History Narrative         Review Of Systems  Constitutional, HEENT, cardiovascular, pulmonary, gi and gu systems are negative, except as otherwise noted.    OBJECTIVE:  /82  Pulse 92  SpO2 97%      Exam:  Physical Exam   Constitutional: He is oriented to person, place, and time. He appears well-developed and well-nourished. No distress.   Abdominal: Soft. Bowel sounds are normal. He exhibits no distension and no mass. There is no tenderness.   Neurological: He is alert and oriented to person, place, and time.   Psychiatric: He has a normal mood and affect.     Other exam not repeated    Labs:  Office Visit on 01/09/2018   Component Date Value Ref Range Status     TSH 01/09/2018 1.80  0.40 - 4.00 mU/L Final     Vitamin D Deficiency screening 01/09/2018 12* 20 - 75 ug/L Final    Comment: Season, race, dietary intake, and treatment affect the concentration of   25-hydroxy-Vitamin D. Values may decrease during winter months and increase   during summer months. Values 20-29 ug/L may indicate Vitamin D insufficiency   and values <20 ug/L may indicate Vitamin D deficiency.  Vitamin D determination is routinely performed by an immunoassay specific for   25 hydroxyvitamin D3.  If an individual is on vitamin D2 (ergocalciferol)    supplementation, please specify 25 OH vitamin D2 and D3 level determination by   LCMSMS test [id number].       Prolactin 01/09/2018 26* 2 - 18 ug/L Final     Sodium 01/09/2018 141  133 - 144 mmol/L Final     Potassium 01/09/2018 4.2  3.4 - 5.3 mmol/L Final     Chloride 01/09/2018 107  94 - 109 mmol/L Final     Carbon Dioxide 01/09/2018 25  20 - 32 mmol/L Final     Anion Gap 01/09/2018 9  3 - 14 mmol/L Final     Glucose 01/09/2018 93  70 - 99 mg/dL Final     Urea Nitrogen 01/09/2018 11  7 - 30 mg/dL Final     Creatinine 01/09/2018 0.62* 0.66 - 1.25 mg/dL Final     GFR Estimate 01/09/2018 >90  >60 mL/min/1.7m2 Final    Non  GFR Calc     GFR Estimate If Black 01/09/2018 >90  >60 mL/min/1.7m2 Final    African American GFR Calc     Calcium 01/09/2018 9.2  8.5 - 10.1 mg/dL Final     Magnesium 01/09/2018 2.1  1.6 - 2.3 mg/dL Final     Vitamin B12 01/09/2018 814  193 - 986 pg/mL Final       ASSESSMENT/PLAN:  Slow transit constipation  X rays show a large amount of stool  Mag citrate as written.  Begin DSS twice daily.  Follow up one month.  - XR ABDOMEN 2 VW (Clinic Performed); Future  - magnesium citrate (CVS MAGNESIUM CITRATE) 1.745 GM/30ML solution; Take 296 mLs by mouth once for 1 dose  - docusate sodium (COLACE) 100 MG capsule; Take 1 capsule (100 mg) by mouth 2 times daily            Berny Velasquez MD

## 2018-02-20 ENCOUNTER — OFFICE VISIT (OUTPATIENT)
Dept: FAMILY MEDICINE | Facility: OTHER | Age: 38
End: 2018-02-20
Attending: FAMILY MEDICINE
Payer: MEDICARE

## 2018-02-20 VITALS
HEIGHT: 72 IN | DIASTOLIC BLOOD PRESSURE: 74 MMHG | TEMPERATURE: 97.9 F | HEART RATE: 82 BPM | WEIGHT: 243.5 LBS | SYSTOLIC BLOOD PRESSURE: 118 MMHG | BODY MASS INDEX: 32.98 KG/M2 | OXYGEN SATURATION: 97 %

## 2018-02-20 DIAGNOSIS — J45.20 MILD INTERMITTENT ASTHMA WITHOUT COMPLICATION: Primary | ICD-10-CM

## 2018-02-20 DIAGNOSIS — K59.01 SLOW TRANSIT CONSTIPATION: ICD-10-CM

## 2018-02-20 PROCEDURE — G0463 HOSPITAL OUTPT CLINIC VISIT: HCPCS

## 2018-02-20 PROCEDURE — 99213 OFFICE O/P EST LOW 20 MIN: CPT | Performed by: FAMILY MEDICINE

## 2018-02-20 ASSESSMENT — PAIN SCALES - GENERAL: PAINLEVEL: NO PAIN (0)

## 2018-02-20 NOTE — PROGRESS NOTES
SUBJECTIVE:  Casey Pedro, 37 year old, male is seen with the following medical problems.    Constipation.  Staff has noted.increasing difficulties with constipation.  This has been present over the last several months. He will go several days without a bowel movement and is assocated with behaviors. Casey was placed on mag citrate and his symptoms have improved.    Dyspnea.   Casey has had worsening dyspnea.  This is worse with activity.  He has a previous history of asthma and has been on albuterol metered dose inhaler.    Denies fever, shaking, chills, dysphagia, change in appetite, weight loss, nausea, vomiting, diarrhea, melena, or hematochezia.  No change in bowel habits.    Current Outpatient Prescriptions   Medication Sig Dispense Refill     docusate sodium (COLACE) 100 MG capsule Take 1 capsule (100 mg) by mouth 2 times daily 60 capsule 3     ALL DAY ALLERGY 10 MG tablet Take one (1) tablet BY MOUTH daily 90 tablet 0     vitamin D (ERGOCALCIFEROL) 95799 UNIT capsule Take 1 capsule (50,000 Units) by mouth every 7 days for 8 doses 8 capsule 0     Cholecalciferol (VITAMIN D) 2000 UNITS tablet Take 2,000 Units by mouth daily 90 tablet 0     omeprazole (PRILOSEC) 40 MG capsule Take 1 capsule (40 mg) by mouth daily Take 30-60 minutes before a meal. 30 capsule 2     albuterol (PROAIR HFA/PROVENTIL HFA/VENTOLIN HFA) 108 (90 BASE) MCG/ACT Inhaler Inhale 2 puffs into the lungs every 6 hours as needed for shortness of breath / dyspnea or wheezing 1 Inhaler 0     fish oil-omega-3 fatty acids 1000 MG capsule Take two (2) capsules BY MOUTH daily 100 capsule 2     OLANZAPINE PO Take 5 mg by mouth 5mg tab dissolvable PRN       divalproex (DEPAKOTE) 500 MG EC tablet Take 500 mg by mouth 2 times daily       LORazepam (ATIVAN) 1 MG tablet Take 1 mg by mouth 2 times daily       OLANZapine (ZYPREXA PO) Take 5 mg by mouth At 11am       guanFACINE (TENEX) 1 MG tablet Take 1 tablet (1 mg) by mouth 2 times daily 60 tablet 10      "OLANZapine (ZYPREXA) 20 MG tablet Take 20 mg by mouth daily Take with the 10mg.       OLANZapine (ZYPREXA) 10 MG tablet 10 mg Take one tablet (10mg) by oral route every day          No Known Allergies    Past Medical History:   Diagnosis Date     Autism 09/16/2011     BPH 09/16/2011     Mental retardation 09/16/2011     History reviewed. No pertinent surgical history.  Family History   Problem Relation Age of Onset     Alcohol/Drug Father      alcoholism     Social History     Social History     Marital status: Single     Spouse name: N/A     Number of children: N/A     Years of education: N/A     Occupational History     Not on file.     Social History Main Topics     Smoking status: Never Smoker     Smokeless tobacco: Never Used      Comment: no passive exposure     Alcohol use No     Drug use: No     Sexual activity: No     Other Topics Concern     Caffeine Concern No     Exercise Yes     daily     Seat Belt Yes     Parent/Sibling W/ Cabg, Mi Or Angioplasty Before 65f 55m? No     unknown     Social History Narrative         Review Of Systems  Constitutional, HEENT, cardiovascular, pulmonary, gi and gu systems are negative, except as otherwise noted.    OBJECTIVE:  /74  Pulse 82  Temp 97.9  F (36.6  C) (Tympanic)  Ht 5' 11.5\" (1.816 m)  Wt 243 lb 8 oz (110.5 kg)  SpO2 97%  BMI 33.49 kg/m2      Exam:  Physical Exam   Constitutional: He is oriented to person, place, and time. He appears well-developed and well-nourished. No distress.   Abdominal: Soft. Bowel sounds are normal. He exhibits no distension and no mass. There is no tenderness.   Neurological: He is alert and oriented to person, place, and time.   Psychiatric: He has a normal mood and affect.     Other exam not repeated    Labs:  Office Visit on 01/09/2018   Component Date Value Ref Range Status     TSH 01/09/2018 1.80  0.40 - 4.00 mU/L Final     Vitamin D Deficiency screening 01/09/2018 12* 20 - 75 ug/L Final    Comment: Season, race, dietary " intake, and treatment affect the concentration of   25-hydroxy-Vitamin D. Values may decrease during winter months and increase   during summer months. Values 20-29 ug/L may indicate Vitamin D insufficiency   and values <20 ug/L may indicate Vitamin D deficiency.  Vitamin D determination is routinely performed by an immunoassay specific for   25 hydroxyvitamin D3.  If an individual is on vitamin D2 (ergocalciferol)   supplementation, please specify 25 OH vitamin D2 and D3 level determination by   LCMSMS test [id number].       Prolactin 01/09/2018 26* 2 - 18 ug/L Final     Sodium 01/09/2018 141  133 - 144 mmol/L Final     Potassium 01/09/2018 4.2  3.4 - 5.3 mmol/L Final     Chloride 01/09/2018 107  94 - 109 mmol/L Final     Carbon Dioxide 01/09/2018 25  20 - 32 mmol/L Final     Anion Gap 01/09/2018 9  3 - 14 mmol/L Final     Glucose 01/09/2018 93  70 - 99 mg/dL Final     Urea Nitrogen 01/09/2018 11  7 - 30 mg/dL Final     Creatinine 01/09/2018 0.62* 0.66 - 1.25 mg/dL Final     GFR Estimate 01/09/2018 >90  >60 mL/min/1.7m2 Final    Non  GFR Calc     GFR Estimate If Black 01/09/2018 >90  >60 mL/min/1.7m2 Final    African American GFR Calc     Calcium 01/09/2018 9.2  8.5 - 10.1 mg/dL Final     Magnesium 01/09/2018 2.1  1.6 - 2.3 mg/dL Final     Vitamin B12 01/09/2018 814  193 - 986 pg/mL Final       ASSESSMENT/PLAN:  Slow transit constipation  Continue same bowel regimen.    Mild intermittent asthma without complication  Recommend scheduled albuterol metered dose inhaler.   This is written.  Follow up 3 months.              Berny Velasquez MD

## 2018-02-20 NOTE — NURSING NOTE
"Chief Complaint   Patient presents with     RECHECK     2 week follow up Constipation        Initial /74  Pulse 82  Temp 97.9  F (36.6  C) (Tympanic)  Ht 5' 11.5\" (1.816 m)  Wt 243 lb 8 oz (110.5 kg)  SpO2 97%  BMI 33.49 kg/m2 Estimated body mass index is 33.49 kg/(m^2) as calculated from the following:    Height as of this encounter: 5' 11.5\" (1.816 m).    Weight as of this encounter: 243 lb 8 oz (110.5 kg).  Medication Reconciliation: complete   ABBI CASTILLO LPN  "

## 2018-02-20 NOTE — MR AVS SNAPSHOT
"              After Visit Summary   2/20/2018    Casey Pedro    MRN: [id number]           Patient Information     Date Of Birth          1980        Visit Information        Provider Department      2/20/2018 9:40 AM Berny Velasquez MD [facility]        Today's Diagnoses     Mild intermittent asthma without complication    -  1    Slow transit constipation          Care Instructions    Use albuterol metered dose inhaler three times daily          Follow-ups after your visit        Follow-up notes from your care team     Return in about 3 months (around 5/20/2018) for Follow Up Visit.      Who to contact     If you have questions or need follow up information about today's clinic visit or your schedule please contact [facility] directly at [phone].  Normal or non-critical lab and imaging results will be communicated to you by [employer], letter or phone within 4 business days after the clinic has received the results. If you do not hear from us within 7 days, please contact the clinic through [employer] or phone. If you have a critical or abnormal lab result, we will notify you by phone as soon as possible.  Submit refill requests through [employer] or call your pharmacy and they will forward the refill request to us. Please allow 3 business days for your refill to be completed.          Additional Information About Your Visit        [employer] Information     [employer] lets you send messages to your doctor, view your test results, renew your prescriptions, schedule appointments and more. To sign up, go to [email or web address] . Click on \"Log in\" on the left side of the screen, which will take you to the Welcome page. Then click on \"Sign up Now\" on the right side of the page.     You will be asked to enter the access code listed below, as well as some personal information. Please follow the directions to create your username and password.     Your access code is: " "[email or web address]  Expires: 3/26/2018  9:12 AM     Your access code will  in 90 days. If you need help or a new code, please call your [place] clinic or [phone].        Care EveryWhere ID     This is your Care EveryWhere ID. This could be used by other organizations to access your [place] medical records  [id number]        Your Vitals Were     Pulse Temperature Height Pulse Oximetry BMI (Body Mass Index)       82 97.9  F (36.6  C) (Tympanic) 5' 11.5\" (1.816 m) 97% 33.49 kg/m2        Blood Pressure from Last 3 Encounters:   18 118/74   18 120/82   18 106/78    Weight from Last 3 Encounters:   18 243 lb 8 oz (110.5 kg)   18 245 lb 9.6 oz (111.4 kg)   17 245 lb (111.1 kg)              Today, you had the following     No orders found for display       Primary Care Provider Office Phone # Fax #    Berny Velasquez [phone]       [address] [place] [address]        Equal Access to Services     WU FARIAS AH: Hadkarina deleon Sojimmie, waaxda luqadaha, qaybta kaalmada adestacyda, brent castillo. So [facility] [phone].    ATENCIÓN: Si habla español, tiene a saunders disposición servicios gratuitos de asistencia lingüística. Llame al [phone].    We comply with applicable federal civil rights laws and [place] laws. We do not discriminate on the basis of race, color, national origin, age, disability, sex, sexual orientation, or gender identity.            Thank you!     Thank you for choosing [facility]  for your care. Our goal is always to provide you with excellent care. Hearing back from our patients is one way we can continue to improve our services. Please take a few minutes to complete the written survey that you may receive in the mail after your visit with us. Thank you!             Your Updated Medication List - Protect others around you: Learn how to safely use, store and throw away your medicines " at www.disposemymeds.org.          This list is accurate as of 2/20/18 10:13 AM.  Always use your most recent med list.                   Brand Name Dispense Instructions for use Diagnosis    albuterol 108 (90 BASE) MCG/ACT Inhaler    PROAIR HFA/PROVENTIL HFA/VENTOLIN HFA    1 Inhaler    Inhale 2 puffs into the lungs every 6 hours as needed for shortness of breath / dyspnea or wheezing    Acute bronchitis, unspecified organism       ALL DAY ALLERGY 10 MG tablet   Generic drug:  cetirizine     90 tablet    Take one (1) tablet BY MOUTH daily    Chronic seasonal allergic rhinitis, unspecified trigger       divalproex sodium delayed-release 500 MG DR tablet    DEPAKOTE     Take 500 mg by mouth 2 times daily        docusate sodium 100 MG capsule    COLACE    60 capsule    Take 1 capsule (100 mg) by mouth 2 times daily    Slow transit constipation       fish oil-omega-3 fatty acids 1000 MG capsule     100 capsule    Take two (2) capsules BY MOUTH daily    Health care maintenance       guanFACINE 1 MG tablet    TENEX    60 tablet    Take 1 tablet (1 mg) by mouth 2 times daily    Diagnosis unknown       LORazepam 1 MG tablet    ATIVAN     Take 1 mg by mouth 2 times daily        omeprazole 40 MG capsule    priLOSEC    30 capsule    Take 1 capsule (40 mg) by mouth daily Take 30-60 minutes before a meal.    Gastroesophageal reflux disease without esophagitis, Disruptive behavior disorder       vitamin D 2000 UNITS tablet     90 tablet    Take 2,000 Units by mouth daily    Vitamin D deficiency       vitamin D 10957 UNIT capsule    ERGOCALCIFEROL    8 capsule    Take 1 capsule (50,000 Units) by mouth every 7 days for 8 doses    Vitamin D deficiency       * zyPREXA 10 MG tablet   Generic drug:  OLANZapine      10 mg Take one tablet (10mg) by oral route every day        * ZYPREXA 20 MG tablet   Generic drug:  OLANZapine      Take 20 mg by mouth daily Take with the 10mg.        * ZYPREXA PO      Take 5 mg by mouth At 11am     Disruptive behavior disorder       * OLANZAPINE PO      Take 5 mg by mouth 5mg tab dissolvable PRN        * Notice:  This list has 4 medication(s) that are the same as other medications prescribed for you. Read the directions carefully, and ask your doctor or other care provider to review them with you.

## 2018-03-12 ENCOUNTER — TELEPHONE (OUTPATIENT)
Dept: FAMILY MEDICINE | Facility: OTHER | Age: 38
End: 2018-03-12

## 2018-03-12 ENCOUNTER — OFFICE VISIT (OUTPATIENT)
Dept: FAMILY MEDICINE | Facility: OTHER | Age: 38
End: 2018-03-12
Attending: FAMILY MEDICINE
Payer: MEDICARE

## 2018-03-12 VITALS
OXYGEN SATURATION: 96 % | SYSTOLIC BLOOD PRESSURE: 122 MMHG | DIASTOLIC BLOOD PRESSURE: 78 MMHG | TEMPERATURE: 97.9 F | HEART RATE: 87 BPM

## 2018-03-12 DIAGNOSIS — J45.20 MILD INTERMITTENT ASTHMA WITHOUT COMPLICATION: Primary | ICD-10-CM

## 2018-03-12 PROCEDURE — 99213 OFFICE O/P EST LOW 20 MIN: CPT | Performed by: FAMILY MEDICINE

## 2018-03-12 PROCEDURE — G0463 HOSPITAL OUTPT CLINIC VISIT: HCPCS

## 2018-03-12 ASSESSMENT — ANXIETY QUESTIONNAIRES
GAD7 TOTAL SCORE: 0
5. BEING SO RESTLESS THAT IT IS HARD TO SIT STILL: NOT AT ALL
7. FEELING AFRAID AS IF SOMETHING AWFUL MIGHT HAPPEN: NOT AT ALL
3. WORRYING TOO MUCH ABOUT DIFFERENT THINGS: NOT AT ALL
6. BECOMING EASILY ANNOYED OR IRRITABLE: NOT AT ALL
2. NOT BEING ABLE TO STOP OR CONTROL WORRYING: NOT AT ALL
1. FEELING NERVOUS, ANXIOUS, OR ON EDGE: NOT AT ALL
IF YOU CHECKED OFF ANY PROBLEMS ON THIS QUESTIONNAIRE, HOW DIFFICULT HAVE THESE PROBLEMS MADE IT FOR YOU TO DO YOUR WORK, TAKE CARE OF THINGS AT HOME, OR GET ALONG WITH OTHER PEOPLE: NOT DIFFICULT AT ALL

## 2018-03-12 ASSESSMENT — PATIENT HEALTH QUESTIONNAIRE - PHQ9: 5. POOR APPETITE OR OVEREATING: NOT AT ALL

## 2018-03-12 NOTE — TELEPHONE ENCOUNTER
Notified [facility] that Dr. Velasquez had changed inhaler to a PRN instead of a give.

## 2018-03-12 NOTE — NURSING NOTE
"Chief Complaint   Patient presents with     RECHECK     Inhaler follow up       Initial /78  Pulse 87  Temp 97.9  F (36.6  C) (Tympanic)  SpO2 96% Estimated body mass index is 33.49 kg/(m^2) as calculated from the following:    Height as of 2/20/18: 5' 11.5\" (1.816 m).    Weight as of 2/20/18: 243 lb 8 oz (110.5 kg).  Medication Reconciliation: complete   ABBI CASTILLO LPN  "

## 2018-03-12 NOTE — PROGRESS NOTES
SUBJECTIVE:   Casey Pedro is a 37 year old male who presents to clinic today for the following health issues:    Medication Followup of Proair HFA/Proventil HFA/Ventolin HFA    Taking Medication as prescribed: yes    Side Effects:  None    Medication Helping Symptoms:  Employee with stated she does not see a difference      Dyspnea. Casey has had worsening dyspnea.  This is worse with activity.  He has a previous history of asthma and has been on albuterol metered dose inhaler.  This has not improved.      Problem list and histories reviewed & adjusted, as indicated.  Additional history: as documented    Patient Active Problem List   Diagnosis     BPH (begnign prostatic hyperplasia)     Mental retardation     Autism     Annual RUST Examination     Disruptive behavior disorder     Seasonal allergic rhinitis     Mild intermittent asthma without complication     Slow transit constipation     History reviewed. No pertinent surgical history.    Social History   Substance Use Topics     Smoking status: Never Smoker     Smokeless tobacco: Never Used      Comment: no passive exposure     Alcohol use No     Family History   Problem Relation Age of Onset     Alcohol/Drug Father      alcoholism         Current Outpatient Prescriptions   Medication Sig Dispense Refill     VITAMIN D, CHOLECALCIFEROL, PO Take 50,000 Units by mouth every 7 days       docusate sodium (COLACE) 100 MG capsule Take 1 capsule (100 mg) by mouth 2 times daily 60 capsule 3     ALL DAY ALLERGY 10 MG tablet Take one (1) tablet BY MOUTH daily 90 tablet 0     Cholecalciferol (VITAMIN D) 2000 UNITS tablet Take 2,000 Units by mouth daily 90 tablet 0     omeprazole (PRILOSEC) 40 MG capsule Take 1 capsule (40 mg) by mouth daily Take 30-60 minutes before a meal. 30 capsule 2     albuterol (PROAIR HFA/PROVENTIL HFA/VENTOLIN HFA) 108 (90 BASE) MCG/ACT Inhaler Inhale 2 puffs into the lungs every 6 hours as needed for shortness of breath / dyspnea or wheezing 1  Inhaler 0     fish oil-omega-3 fatty acids 1000 MG capsule Take two (2) capsules BY MOUTH daily 100 capsule 2     OLANZAPINE PO Take 5 mg by mouth 5mg tab dissolvable PRN       divalproex (DEPAKOTE) 500 MG EC tablet Take 500 mg by mouth 2 times daily       LORazepam (ATIVAN) 1 MG tablet Take 1 mg by mouth 2 times daily       OLANZapine (ZYPREXA PO) Take 5 mg by mouth At 11am       guanFACINE (TENEX) 1 MG tablet Take 1 tablet (1 mg) by mouth 2 times daily 60 tablet 10     OLANZapine (ZYPREXA) 20 MG tablet Take 20 mg by mouth daily Take with the 10mg.       OLANZapine (ZYPREXA) 10 MG tablet 10 mg Take one tablet (10mg) by oral route every day         No Known Allergies    Reviewed and updated as needed this visit by clinical staff       Reviewed and updated as needed this visit by Provider         ROS:  Constitutional, HEENT, cardiovascular, pulmonary, gi and gu systems are negative, except as otherwise noted.    OBJECTIVE:     There were no vitals taken for this visit.  There is no height or weight on file to calculate BMI.  Physical Exam   Constitutional: He is oriented to person, place, and time. He appears well-developed and well-nourished. No distress.   Neurological: He is alert and oriented to person, place, and time.   Psychiatric: He has a normal mood and affect.         Diagnostic Test Results:  none     ASSESSMENT/PLAN:     Mild intermittent asthma without complication  Discussed possible behavioral or weight gain issues due to its association with meals.  Will follow.            Berny Velasquez MD  [facility]

## 2018-03-12 NOTE — TELEPHONE ENCOUNTER
3:54 PM    Reason for Call: Phone Call    Description:PAtient was in today and the staff report at [facility] stated that Dr. Velasquez changed orders for the  ventilin inhaler and it is not on the  Paperwork, Gerda said she would be happy to take a phone order.    Was an appointment offered for this call? No  If yes : Appointment type              Date    Preferred method for responding to this message: Telephone Call  What is your phone number ? [phone]    If we cannot reach you directly, may we leave a detailed response at the number you provided? No    Can this message wait until your PCP/provider returns, if available today? No,     Joanne Martinez

## 2018-03-12 NOTE — MR AVS SNAPSHOT
"              After Visit Summary   3/12/2018    Casey Pedro    MRN: [id number]           Patient Information     Date Of Birth          1980        Visit Information        Provider Department      3/12/2018 9:20 AM Berny Velasquez MD [facility] [place]        Today's Diagnoses     Mild intermittent asthma without complication    -  1      Care Instructions    Continue same medications.            Follow-ups after your visit        Follow-up notes from your care team     Return if symptoms worsen or fail to improve.      Who to contact     If you have questions or need follow up information about today's clinic visit or your schedule [facility] at [phone].  Normal or non-critical lab and imaging results will be communicated to you by MyChart, letter or phone within 4 business days after the clinic has received the results. If you do not hear from us within 7 days, please contact the clinic through MyChart or phone. If you have a critical or abnormal lab result, we will notify you by phone as soon as possible.  Submit refill requests through [employer] or call your pharmacy and they will forward the refill request to us. Please allow 3 business days for your refill to be completed.          Additional Information About Your Visit        MyChart Information     [employer] lets you send messages to your doctor, view your test results, renew your prescriptions, schedule appointments and more. To sign up, go to [email or web address] . Click on \"Log in\" on the left side of the screen, which will take you to the Welcome page. Then click on \"Sign up Now\" on the right side of the page.     You will be asked to enter the access code listed below, as well as some personal information. Please follow the directions to create your username and password.     Your access code is: [email or web address]  Expires: 3/26/2018 10:12 AM     Your access code will  in 90 days. If you " need help or a new code, please call your [place] clinic or [phone].        Care EveryWhere ID     This is your Care EveryWhere ID. This could be used by other organizations to access your [place] medical records  [id number]        Your Vitals Were     Pulse Temperature Pulse Oximetry             87 97.9  F (36.6  C) (Tympanic) 96%          Blood Pressure from Last 3 Encounters:   03/12/18 122/78   02/20/18 118/74   02/06/18 120/82    Weight from Last 3 Encounters:   02/20/18 243 lb 8 oz (110.5 kg)   01/09/18 245 lb 9.6 oz (111.4 kg)   12/26/17 245 lb (111.1 kg)              Today, you had the following     No orders found for display       Primary Care Provider Office Phone # Fax #    Berny Velasquez [phone] [address] [address]        Equal Access to Services     REY FARIAS AH: Hadii jorge ku hadasho Soomaali, waaxda luqadaha, qaybta kaalmada adeegyada, brent magallon . So [facility] [phone].    ATENCIÓN: Si dionisiola espcollette, tiene a saunders disposición servicios gratuitos de asistencia lingüística. Llame al [phone].    We comply with applicable federal civil rights laws and [place] laws. We do not discriminate on the basis of race, color, national origin, age, disability, sex, sexual orientation, or gender identity.            Thank you!     Thank you for choosing [facility] ([facility]]  for your care. Our goal is always to provide you with excellent care. Hearing back from our patients is one way we can continue to improve our services. Please take a few minutes to complete the written survey that you may receive in the mail after your visit with us. Thank you!             Your Updated Medication List - Protect others around you: Learn how to safely use, store and throw away your medicines at www.disposemymeds.org.          This list is accurate as of 3/12/18 10:37 AM.  Always use your most recent med list.                   Brand  Name Dispense Instructions for use Diagnosis    albuterol 108 (90 BASE) MCG/ACT Inhaler    PROAIR HFA/PROVENTIL HFA/VENTOLIN HFA    1 Inhaler    Inhale 2 puffs into the lungs every 6 hours as needed for shortness of breath / dyspnea or wheezing    Acute bronchitis, unspecified organism       ALL DAY ALLERGY 10 MG tablet   Generic drug:  cetirizine     90 tablet    Take one (1) tablet BY MOUTH daily    Chronic seasonal allergic rhinitis, unspecified trigger       divalproex sodium delayed-release 500 MG DR tablet    DEPAKOTE     Take 500 mg by mouth 2 times daily        docusate sodium 100 MG capsule    COLACE    60 capsule    Take 1 capsule (100 mg) by mouth 2 times daily    Slow transit constipation       fish oil-omega-3 fatty acids 1000 MG capsule     100 capsule    Take two (2) capsules BY MOUTH daily    Health care maintenance       guanFACINE 1 MG tablet    TENEX    60 tablet    Take 1 tablet (1 mg) by mouth 2 times daily    Diagnosis unknown       LORazepam 1 MG tablet    ATIVAN     Take 1 mg by mouth 2 times daily        omeprazole 40 MG capsule    priLOSEC    30 capsule    Take 1 capsule (40 mg) by mouth daily Take 30-60 minutes before a meal.    Gastroesophageal reflux disease without esophagitis, Disruptive behavior disorder       * VITAMIN D (CHOLECALCIFEROL) PO      Take 50,000 Units by mouth every 7 days        * vitamin D 2000 UNITS tablet     90 tablet    Take 2,000 Units by mouth daily    Vitamin D deficiency       * zyPREXA 10 MG tablet   Generic drug:  OLANZapine      10 mg Take one tablet (10mg) by oral route every day        * ZYPREXA 20 MG tablet   Generic drug:  OLANZapine      Take 20 mg by mouth daily Take with the 10mg.        * ZYPREXA PO      Take 5 mg by mouth At 11am    Disruptive behavior disorder       * OLANZAPINE PO      Take 5 mg by mouth 5mg tab dissolvable PRN        * Notice:  This list has 6 medication(s) that are the same as other medications prescribed for you. Read the  directions carefully, and ask your doctor or other care provider to review them with you.

## 2018-03-13 ASSESSMENT — ANXIETY QUESTIONNAIRES: GAD7 TOTAL SCORE: 0

## 2018-03-13 ASSESSMENT — PATIENT HEALTH QUESTIONNAIRE - PHQ9: SUM OF ALL RESPONSES TO PHQ QUESTIONS 1-9: 0

## 2018-03-14 ENCOUNTER — TELEPHONE (OUTPATIENT)
Dept: FAMILY MEDICINE | Facility: OTHER | Age: 38
End: 2018-03-14

## 2018-03-14 NOTE — TELEPHONE ENCOUNTER
VITAMIN D, CHOLECALCIFEROL, PO    Last Written Prescription Date:  historical medication   Last Fill Quantity: 8,   # refills: 0  Last Office Visit: 3/12/18  Future Office visit:       Routing refill request to provider for review/approval because:  Medication is reported/historical

## 2018-03-16 DIAGNOSIS — Z00.00 HEALTH CARE MAINTENANCE: Primary | ICD-10-CM

## 2018-03-19 RX ORDER — ERGOCALCIFEROL 1.25 MG/1
CAPSULE, LIQUID FILLED ORAL
Qty: 8 CAPSULE | Refills: 0 | Status: SHIPPED | OUTPATIENT
Start: 2018-03-19 | End: 2018-03-30

## 2018-03-30 DIAGNOSIS — F91.9 DISRUPTIVE BEHAVIOR DISORDER: ICD-10-CM

## 2018-03-30 DIAGNOSIS — K21.9 GASTROESOPHAGEAL REFLUX DISEASE WITHOUT ESOPHAGITIS: ICD-10-CM

## 2018-03-30 DIAGNOSIS — Z00.00 HEALTH CARE MAINTENANCE: ICD-10-CM

## 2018-04-02 RX ORDER — ERGOCALCIFEROL 1.25 MG/1
CAPSULE, LIQUID FILLED ORAL
Qty: 8 CAPSULE | Refills: 0 | Status: SHIPPED | OUTPATIENT
Start: 2018-04-02 | End: 2018-06-06

## 2018-04-02 RX ORDER — OMEPRAZOLE 40 MG/1
CAPSULE, DELAYED RELEASE ORAL
Qty: 30 CAPSULE | Refills: 6 | Status: SHIPPED | OUTPATIENT
Start: 2018-04-02 | End: 2018-08-09

## 2018-04-02 NOTE — TELEPHONE ENCOUNTER
Vitamin D      Last Written Prescription Date:  historical  Last Fill Quantity: ,   # refills:   Last Office Visit: 3/12/18  Future Office visit:   none

## 2018-04-09 DIAGNOSIS — E55.9 VITAMIN D DEFICIENCY: ICD-10-CM

## 2018-04-10 RX ORDER — CHOLECALCIFEROL (VITAMIN D3) 50 MCG
TABLET ORAL
Qty: 100 TABLET | Refills: 3 | Status: SHIPPED | OUTPATIENT
Start: 2018-04-10 | End: 2018-05-01

## 2018-04-10 NOTE — TELEPHONE ENCOUNTER
Please advise.  Patient has Vitamin D 50,000 units and 2000 units on current medication list.    Thank you.

## 2018-04-23 DIAGNOSIS — J30.2 CHRONIC SEASONAL ALLERGIC RHINITIS, UNSPECIFIED TRIGGER: ICD-10-CM

## 2018-04-24 RX ORDER — CETIRIZINE HYDROCHLORIDE 10 MG/1
TABLET, FILM COATED ORAL
Qty: 90 TABLET | Refills: 2 | Status: SHIPPED | OUTPATIENT
Start: 2018-04-24 | End: 2019-01-19

## 2018-05-01 ENCOUNTER — OFFICE VISIT (OUTPATIENT)
Dept: FAMILY MEDICINE | Facility: OTHER | Age: 38
End: 2018-05-01
Attending: FAMILY MEDICINE
Payer: MEDICARE

## 2018-05-01 VITALS
DIASTOLIC BLOOD PRESSURE: 80 MMHG | HEART RATE: 95 BPM | TEMPERATURE: 98 F | WEIGHT: 254.4 LBS | SYSTOLIC BLOOD PRESSURE: 126 MMHG | BODY MASS INDEX: 34.46 KG/M2 | HEIGHT: 72 IN | OXYGEN SATURATION: 96 %

## 2018-05-01 DIAGNOSIS — F84.0 AUTISM: ICD-10-CM

## 2018-05-01 DIAGNOSIS — F70 MILD MENTAL RETARDATION (I.Q. 50-70): ICD-10-CM

## 2018-05-01 DIAGNOSIS — Z00.00 ROUTINE GENERAL MEDICAL EXAMINATION AT A HEALTH CARE FACILITY: Primary | ICD-10-CM

## 2018-05-01 LAB
BASOPHILS # BLD AUTO: 0 10E9/L (ref 0–0.2)
BASOPHILS NFR BLD AUTO: 0.6 %
CHOLEST SERPL-MCNC: 146 MG/DL
DIFFERENTIAL METHOD BLD: ABNORMAL
EOSINOPHIL # BLD AUTO: 0.1 10E9/L (ref 0–0.7)
EOSINOPHIL NFR BLD AUTO: 2.9 %
ERYTHROCYTE [DISTWIDTH] IN BLOOD BY AUTOMATED COUNT: 12.5 % (ref 10–15)
HCT VFR BLD AUTO: 42.7 % (ref 40–53)
HDLC SERPL-MCNC: 28 MG/DL
HGB BLD-MCNC: 15 G/DL (ref 13.3–17.7)
IMM GRANULOCYTES # BLD: 0 10E9/L (ref 0–0.4)
IMM GRANULOCYTES NFR BLD: 0.4 %
LDLC SERPL CALC-MCNC: ABNORMAL MG/DL
LYMPHOCYTES # BLD AUTO: 1.6 10E9/L (ref 0.8–5.3)
LYMPHOCYTES NFR BLD AUTO: 33.3 %
MCH RBC QN AUTO: 30.1 PG (ref 26.5–33)
MCHC RBC AUTO-ENTMCNC: 35.1 G/DL (ref 31.5–36.5)
MCV RBC AUTO: 86 FL (ref 78–100)
MONOCYTES # BLD AUTO: 0.5 10E9/L (ref 0–1.3)
MONOCYTES NFR BLD AUTO: 10 %
NEUTROPHILS # BLD AUTO: 2.6 10E9/L (ref 1.6–8.3)
NEUTROPHILS NFR BLD AUTO: 52.8 %
NONHDLC SERPL-MCNC: 118 MG/DL
NRBC # BLD AUTO: 0 10*3/UL
NRBC BLD AUTO-RTO: 0 /100
PLATELET # BLD AUTO: 148 10E9/L (ref 150–450)
RBC # BLD AUTO: 4.99 10E12/L (ref 4.4–5.9)
TRIGL SERPL-MCNC: 497 MG/DL
WBC # BLD AUTO: 4.9 10E9/L (ref 4–11)

## 2018-05-01 PROCEDURE — 80061 LIPID PANEL: CPT | Mod: ZL | Performed by: FAMILY MEDICINE

## 2018-05-01 PROCEDURE — 99395 PREV VISIT EST AGE 18-39: CPT | Performed by: FAMILY MEDICINE

## 2018-05-01 PROCEDURE — 85025 COMPLETE CBC W/AUTO DIFF WBC: CPT | Mod: ZL | Performed by: FAMILY MEDICINE

## 2018-05-01 PROCEDURE — 36415 COLL VENOUS BLD VENIPUNCTURE: CPT | Mod: ZL | Performed by: FAMILY MEDICINE

## 2018-05-01 ASSESSMENT — ANXIETY QUESTIONNAIRES
3. WORRYING TOO MUCH ABOUT DIFFERENT THINGS: NOT AT ALL
5. BEING SO RESTLESS THAT IT IS HARD TO SIT STILL: NOT AT ALL
4. TROUBLE RELAXING: NOT AT ALL
1. FEELING NERVOUS, ANXIOUS, OR ON EDGE: SEVERAL DAYS
6. BECOMING EASILY ANNOYED OR IRRITABLE: NOT AT ALL
GAD7 TOTAL SCORE: 1
2. NOT BEING ABLE TO STOP OR CONTROL WORRYING: NOT AT ALL
7. FEELING AFRAID AS IF SOMETHING AWFUL MIGHT HAPPEN: NOT AT ALL

## 2018-05-01 ASSESSMENT — ASTHMA QUESTIONNAIRES
QUESTION_4 LAST FOUR WEEKS HOW OFTEN HAVE YOU USED YOUR RESCUE INHALER OR NEBULIZER MEDICATION (SUCH AS ALBUTEROL): NOT AT ALL
QUESTION_1 LAST FOUR WEEKS HOW MUCH OF THE TIME DID YOUR ASTHMA KEEP YOU FROM GETTING AS MUCH DONE AT WORK, SCHOOL OR AT HOME: NONE OF THE TIME
QUESTION_3 LAST FOUR WEEKS HOW OFTEN DID YOUR ASTHMA SYMPTOMS (WHEEZING, COUGHING, SHORTNESS OF BREATH, CHEST TIGHTNESS OR PAIN) WAKE YOU UP AT NIGHT OR EARLIER THAN USUAL IN THE MORNING: NOT AT ALL
QUESTION_2 LAST FOUR WEEKS HOW OFTEN HAVE YOU HAD SHORTNESS OF BREATH: ONCE OR TWICE A WEEK
ACT_TOTALSCORE: 24
QUESTION_5 LAST FOUR WEEKS HOW WOULD YOU RATE YOUR ASTHMA CONTROL: COMPLETELY CONTROLLED

## 2018-05-01 NOTE — NURSING NOTE
"Chief Complaint   Patient presents with     Physical       Initial /80  Pulse 95  Temp 98  F (36.7  C)  Ht 5' 11.5\" (1.816 m)  Wt 254 lb 6.4 oz (115.4 kg)  SpO2 96%  BMI 34.99 kg/m2 Estimated body mass index is 34.99 kg/(m^2) as calculated from the following:    Height as of this encounter: 5' 11.5\" (1.816 m).    Weight as of this encounter: 254 lb 6.4 oz (115.4 kg).  Medication Reconciliation: complete   Bernice [facility]   Medical Assistant      "

## 2018-05-01 NOTE — MR AVS SNAPSHOT
"              After Visit Summary   5/1/2018    Casey Pedro    MRN: [id number]           Patient Information     Date Of Birth          1980        Visit Information        Provider Department      5/1/2018 2:00 PM [facility] MD [facility]        Today's Diagnoses     Annual NHS Examination    -  1    Autism        Mental retardation          Care Instructions    Continue same medications.            Follow-ups after your visit        Follow-up notes from your care team     Return in about 1 year (around 5/1/2019) for Comprehensive Exam.      Who to contact     If you have questions or need follow up information about today's clinic visit or your schedule please contact [facility] directly at [phone].  Normal or non-critical lab and imaging results will be communicated to you by MyChart, letter or phone within 4 business days after the clinic has received the results. If you do not hear from us within 7 days, please contact the clinic through MyChart or phone. If you have a critical or abnormal lab result, we will notify you by phone as soon as possible.  Submit refill requests through [employer] or call your pharmacy and they will forward the refill request to us. Please allow 3 business days for your refill to be completed.          Additional Information About Your Visit        MyChart Information     [employer] lets you send messages to your doctor, view your test results, renew your prescriptions, schedule appointments and more. To sign up, go to [email or web address] . Click on \"Log in\" on the left side of the screen, which will take you to the Welcome page. Then click on \"Sign up Now\" on the right side of the page.     You will be asked to enter the access code listed below, as well as some personal information. Please follow the directions to create your username and password.     Your access code is: [email or web address]  Expires: 7/31/2018  5:46 AM     Your access code " "will  in 90 days. If you need help or a new code, please call your [place] clinic or [phone].        Care EveryWhere ID     This is your Care EveryWhere ID. This could be used by other organizations to access your [place] medical records  [id number]        Your Vitals Were     Pulse Temperature Height Pulse Oximetry BMI (Body Mass Index)       95 98  F (36.7  C) 5' 11.5\" (1.816 m) 96% 34.99 kg/m2        Blood Pressure from Last 3 Encounters:   18 126/80   18 122/78   18 118/74    Weight from Last 3 Encounters:   18 254 lb 6.4 oz (115.4 kg)   18 243 lb 8 oz (110.5 kg)   18 245 lb 9.6 oz (111.4 kg)              We Performed the Following     CBC with platelets differential     Lipid Profile (Chol, Trig, HDL, LDL calc)        Primary Care Provider Office Phone # Fax #    Berny Velasquez [phone]       [facility] [address]        Equal Access to Services     [facility]: Hadii aad ku hadasho Soomaali, waaxda luqadaha, qaybta kaalmada ademoniqueyada, brent magallon . So [facility] [phone].    ATENCIÓN: Si habla español, tiene a saunders disposición servicios gratuitos de asistencia lingüística. [facility] [phone].    We comply with applicable federal civil rights laws and [place] laws. We do not discriminate on the basis of race, color, national origin, age, disability, sex, sexual orientation, or gender identity.            Thank you!     Thank you for choosing [facility]  for your care. Our goal is always to provide you with excellent care. Hearing back from our patients is one way we can continue to improve our services. Please take a few minutes to complete the written survey that you may receive in the mail after your visit with us. Thank you!             Your Updated Medication List - Protect others around you: Learn how to safely use, store and throw away your medicines at " www.disposemymeds.org.          This list is accurate as of 5/1/18 11:59 PM.  Always use your most recent med list.                   Brand Name Dispense Instructions for use Diagnosis    albuterol 108 (90 Base) MCG/ACT Inhaler    PROAIR HFA/PROVENTIL HFA/VENTOLIN HFA    1 Inhaler    Inhale 2 puffs into the lungs every 6 hours as needed for shortness of breath / dyspnea or wheezing    Acute bronchitis, unspecified organism       ALL DAY ALLERGY 10 MG tablet   Generic drug:  cetirizine     90 tablet    TAKE ONE (1) TABLET BY MOUTH DAILY    Chronic seasonal allergic rhinitis, unspecified trigger       divalproex sodium delayed-release 500 MG DR tablet    DEPAKOTE     Take 500 mg by mouth 2 times daily        docusate sodium 100 MG capsule    COLACE    60 capsule    Take 1 capsule (100 mg) by mouth 2 times daily    Slow transit constipation       fish oil-omega-3 fatty acids 1000 MG capsule     100 capsule    Take two (2) capsules BY MOUTH daily    Health care maintenance       guanFACINE 1 MG tablet    TENEX    60 tablet    Take 1 tablet (1 mg) by mouth 2 times daily    Diagnosis unknown       LORazepam 1 MG tablet    ATIVAN     Take 1 mg by mouth 2 times daily        omeprazole 40 MG capsule    priLOSEC    30 capsule    Take 1 capsule (40 mg) by mouth daily Take 30-60 minutes before a meal.    Gastroesophageal reflux disease without esophagitis, Disruptive behavior disorder       VITAMIN D (CHOLECALCIFEROL) PO      Take 50,000 Units by mouth every 7 days        vitamin D 28911 UNIT capsule    ERGOCALCIFEROL    8 capsule    Take 1 capsule by mouth every 7 days for 8 doses    Health care maintenance       * zyPREXA 10 MG tablet   Generic drug:  OLANZapine      10 mg Take one tablet (10mg) by oral route every day        * ZYPREXA 20 MG tablet   Generic drug:  OLANZapine      Take 20 mg by mouth daily Take with the 10mg.        * OLANZAPINE PO      Take 5 mg by mouth 5mg tab dissolvable PRN        * Notice:  This list  has 3 medication(s) that are the same as other medications prescribed for you. Read the directions carefully, and ask your doctor or other care provider to review them with you.

## 2018-05-01 NOTE — LETTER
May 2, 2018      Casey Pedro  [address] [address]        Dear ,    We are writing to inform you of your test results.    Will follow triglycerides. Repeat labs in 1 year.    Resulted Orders   CBC with platelets differential   Result Value Ref Range    WBC 4.9 4.0 - 11.0 10e9/L    RBC Count 4.99 4.4 - 5.9 10e12/L    Hemoglobin 15.0 13.3 - 17.7 g/dL    Hematocrit 42.7 40.0 - 53.0 %    MCV 86 78 - 100 fl    MCH 30.1 26.5 - 33.0 pg    MCHC 35.1 31.5 - 36.5 g/dL    RDW 12.5 10.0 - 15.0 %    Platelet Count 148 (L) 150 - 450 10e9/L    Diff Method Automated Method     % Neutrophils 52.8 %    % Lymphocytes 33.3 %    % Monocytes 10.0 %    % Eosinophils 2.9 %    % Basophils 0.6 %    % Immature Granulocytes 0.4 %    Nucleated RBCs 0 0 /100    Absolute Neutrophil 2.6 1.6 - 8.3 10e9/L    Absolute Lymphocytes 1.6 0.8 - 5.3 10e9/L    Absolute Monocytes 0.5 0.0 - 1.3 10e9/L    Absolute Eosinophils 0.1 0.0 - 0.7 10e9/L    Absolute Basophils 0.0 0.0 - 0.2 10e9/L    Abs Immature Granulocytes 0.0 0 - 0.4 10e9/L    Absolute Nucleated RBC 0.0    Lipid Profile (Chol, Trig, HDL, LDL calc)   Result Value Ref Range    Cholesterol 146 <200 mg/dL    Triglycerides 497 (H) <150 mg/dL      Comment:      Borderline high:  150-199 mg/dl  High:             200-499 mg/dl  Very high:       >499 mg/dl  Non Fasting      HDL Cholesterol 28 (L) >39 mg/dL    LDL Cholesterol Calculated  <100 mg/dL     Cannot estimate LDL when triglyceride exceeds 400 mg/dL    Non HDL Cholesterol 118 <130 mg/dL       If you have any questions or concerns, please call the clinic at the number listed above.       Sincerely,        Berny Velasquez MD

## 2018-05-01 NOTE — PROGRESS NOTES
SUBJECTIVE:  Casey Pedro, 37 year old, male is seen for Annual [facility] Examination.  He generally feels well.  Staff notes no issues.    Denies chest pain, dyspnea, decreased exercise tolerance, dizzyness, nausea, vomiting, diaphoresis, palpitations, numbness, tingling, or paresthesias.      Current Outpatient Prescriptions   Medication Sig Dispense Refill     albuterol (PROAIR HFA/PROVENTIL HFA/VENTOLIN HFA) 108 (90 BASE) MCG/ACT Inhaler Inhale 2 puffs into the lungs every 6 hours as needed for shortness of breath / dyspnea or wheezing 1 Inhaler 0     ALL DAY ALLERGY 10 MG tablet TAKE ONE (1) TABLET BY MOUTH DAILY 90 tablet 2     divalproex (DEPAKOTE) 500 MG EC tablet Take 500 mg by mouth 2 times daily       docusate sodium (COLACE) 100 MG capsule Take 1 capsule (100 mg) by mouth 2 times daily 60 capsule 3     fish oil-omega-3 fatty acids 1000 MG capsule Take two (2) capsules BY MOUTH daily 100 capsule 2     guanFACINE (TENEX) 1 MG tablet Take 1 tablet (1 mg) by mouth 2 times daily 60 tablet 10     LORazepam (ATIVAN) 1 MG tablet Take 1 mg by mouth 2 times daily       OLANZapine (ZYPREXA) 10 MG tablet 10 mg Take one tablet (10mg) by oral route every day         OLANZapine (ZYPREXA) 20 MG tablet Take 20 mg by mouth daily Take with the 10mg.       OLANZAPINE PO Take 5 mg by mouth 5mg tab dissolvable PRN       omeprazole (PRILOSEC) 40 MG capsule Take 1 capsule (40 mg) by mouth daily Take 30-60 minutes before a meal. 30 capsule 6     vitamin D (ERGOCALCIFEROL) 05680 UNIT capsule Take 1 capsule by mouth every 7 days for 8 doses 8 capsule 0     VITAMIN D, CHOLECALCIFEROL, PO Take 50,000 Units by mouth every 7 days       [DISCONTINUED] OLANZapine (ZYPREXA PO) Take 5 mg by mouth At 11am        No Known Allergies    Past Medical History:   Diagnosis Date     Autism 09/16/2011     BPH 09/16/2011     Mental retardation 09/16/2011     History reviewed. No pertinent surgical history.  Family History   Problem Relation Age of  Onset     Alcohol/Drug Father      alcoholism     Social History     Social History     Marital status: Single     Spouse name: N/A     Number of children: N/A     Years of education: N/A     Occupational History     Not on file.     Social History Main Topics     Smoking status: Never Smoker     Smokeless tobacco: Never Used      Comment: no passive exposure     Alcohol use No     Drug use: No     Sexual activity: No     Other Topics Concern     Caffeine Concern No     Exercise Yes     daily     Seat Belt Yes     Parent/Sibling W/ Cabg, Mi Or Angioplasty Before 65f 55m? No     unknown     Social History Narrative         Review Of Systems  Constitutional, neuro, ENT, endocrine, pulmonary, cardiac, gastrointestinal, genitourinary, musculoskeletal, integument and psychiatric systems are negative, except as otherwise noted by staff.    OBJECTIVE:  Vitals: B/P: 110/68, T: 97.7, P: 92, R: 18    Exam:  Physical Exam   Constitutional: He is oriented to person, place, and time. He appears well-developed and well-nourished. No distress.   HENT:   Head: Normocephalic and atraumatic.   Right Ear: External ear normal.   Left Ear: External ear normal.   Nose: Nose normal.   Mouth/Throat: Oropharynx is clear and moist.   Eyes: Conjunctivae and EOM are normal. Pupils are equal, round, and reactive to light.   Neck: Normal range of motion. Neck supple. No JVD present. No thyromegaly present.   Cardiovascular: Normal rate, regular rhythm, normal heart sounds and intact distal pulses.  Exam reveals no gallop and no friction rub.    No murmur heard.  Pulmonary/Chest: Effort normal and breath sounds normal. He has no wheezes. He has no rales.   Abdominal: Soft. Bowel sounds are normal. He exhibits no mass. There is no tenderness.   Genitourinary: Rectum normal, prostate normal and penis normal.   Musculoskeletal: Normal range of motion.   Lymphadenopathy:     He has no cervical adenopathy.   Neurological: He is alert and oriented to  person, place, and time. He has normal reflexes.   Skin: Skin is warm and dry.   Psychiatric: He has a normal mood and affect.         Labs:  Office Visit on 11/18/2015   Component Date Value Ref Range Status     WBC 11/18/2015 6.8  4.0 - 11.0 10e9/L Final     RBC Count 11/18/2015 5.46  4.4 - 5.9 10e12/L Final     Hemoglobin 11/18/2015 15.9  13.3 - 17.7 g/dL Final     Hematocrit 11/18/2015 45.7  40.0 - 53.0 % Final     MCV 11/18/2015 84  78 - 100 fl Final     MCH 11/18/2015 29.1  26.5 - 33.0 pg Final     MCHC 11/18/2015 34.8  31.5 - 36.5 g/dL Final     RDW 11/18/2015 13.3  10.0 - 15.0 % Final     Platelet Count 11/18/2015 213  150 - 450 10e9/L Final     Diff Method 11/18/2015 Automated Method   Final     % Neutrophils 11/18/2015 60.5   Final     % Lymphocytes 11/18/2015 24.1   Final     % Monocytes 11/18/2015 12.1   Final     % Eosinophils 11/18/2015 2.7   Final     % Basophils 11/18/2015 0.3   Final     % Immature Granulocytes 11/18/2015 0.3   Final     Absolute Neutrophil 11/18/2015 4.1  1.6 - 8.3 10e9/L Final     Absolute Lymphocytes 11/18/2015 1.6  0.8 - 5.3 10e9/L Final     Absolute Monoctyes 11/18/2015 0.8  0.0 - 1.3 10e9/L Final     Absolute Eosinophils 11/18/2015 0.2  0.0 - 0.7 10e9/L Final     Absolute Basophils 11/18/2015 0.0  0.0 - 0.2 10e9/L Final     Abs Immature Granulocytes 11/18/2015 0.0  0 - 0.4 10e9/L Final     Sodium 11/18/2015 139  133 - 144 mmol/L Final     Potassium 11/18/2015 4.2  3.4 - 5.3 mmol/L Final     Chloride 11/18/2015 108  94 - 109 mmol/L Final     Carbon Dioxide 11/18/2015 26  20 - 32 mmol/L Final     Anion Gap 11/18/2015 5  3 - 14 mmol/L Final     Glucose 11/18/2015 88  70 - 99 mg/dL Final     Urea Nitrogen 11/18/2015 11  7 - 30 mg/dL Final     Creatinine 11/18/2015 0.76  0.66 - 1.25 mg/dL Final     GFR Estimate 11/18/2015   >60 mL/min/1.7m2 Final                    Value:>90  Non  GFR Calc       GFR Estimate If Black 11/18/2015   >60 mL/min/1.7m2 Final                     Value:>90   GFR Calc       Calcium 11/18/2015 9.1  8.5 - 10.1 mg/dL Final     TSH 11/18/2015 0.68  0.40 - 4.00 mU/L Final       ASSESSMENT/PLAN:  Annual NHS Examination  Discussed with staff.  Current medications, allergies, and plan of care reviewed. Nutritional and screening labs drawn.  Resident is free of communicable disease and does not need 24 hour professional nursing care.  Routine exam one year.        Mental retardation  Stable    Autism  Behaviors reviewed              Berny Velasquez MD

## 2018-05-02 ASSESSMENT — ASTHMA QUESTIONNAIRES: ACT_TOTALSCORE: 24

## 2018-05-02 ASSESSMENT — PATIENT HEALTH QUESTIONNAIRE - PHQ9: SUM OF ALL RESPONSES TO PHQ QUESTIONS 1-9: 2

## 2018-05-02 ASSESSMENT — ANXIETY QUESTIONNAIRES: GAD7 TOTAL SCORE: 1

## 2018-05-07 ENCOUNTER — TELEPHONE (OUTPATIENT)
Dept: FAMILY MEDICINE | Facility: OTHER | Age: 38
End: 2018-05-07

## 2018-05-25 DIAGNOSIS — R69 DIAGNOSIS UNKNOWN: ICD-10-CM

## 2018-05-29 ENCOUNTER — TELEPHONE (OUTPATIENT)
Dept: FAMILY MEDICINE | Facility: OTHER | Age: 38
End: 2018-05-29

## 2018-05-29 DIAGNOSIS — F91.9 DISRUPTIVE BEHAVIOR DISORDER: Primary | ICD-10-CM

## 2018-05-29 DIAGNOSIS — F84.0 AUTISM: Primary | ICD-10-CM

## 2018-05-29 DIAGNOSIS — R69 DIAGNOSIS UNKNOWN: ICD-10-CM

## 2018-05-29 RX ORDER — DIVALPROEX SODIUM 500 MG/1
TABLET, DELAYED RELEASE ORAL
Qty: 120 TABLET | Status: CANCELLED | OUTPATIENT
Start: 2018-05-29

## 2018-05-29 RX ORDER — LORAZEPAM 1 MG/1
TABLET ORAL
Qty: 60 TABLET | Status: CANCELLED | OUTPATIENT
Start: 2018-05-29

## 2018-05-29 RX ORDER — GUANFACINE 1 MG/1
TABLET ORAL
Qty: 60 TABLET | Refills: 0 | Status: SHIPPED | OUTPATIENT
Start: 2018-05-29 | End: 2018-05-29

## 2018-05-29 NOTE — TELEPHONE ENCOUNTER
Group home requesting refills on medications pended, Has appointment with new Dr but is out of these meds now.

## 2018-05-29 NOTE — TELEPHONE ENCOUNTER
1:07 PM    Reason for Call: Phone Call    Description: From group home patient needs prescription filled from [facility] or the patient will have issues. Please call her back concerning this. [phone]    Was an appointment offered for this call? No  If yes : Appointment type              Date    Preferred method for responding to this message: Telephone Call  What is your phone number ?    If we cannot reach you directly, may we leave a detailed response at the number you provided? Yes    Can this message wait until your PCP/provider returns, if available today? No,       Safia Caceres

## 2018-05-30 ENCOUNTER — TELEPHONE (OUTPATIENT)
Dept: FAMILY MEDICINE | Facility: OTHER | Age: 38
End: 2018-05-30

## 2018-05-30 RX ORDER — OLANZAPINE 10 MG/1
TABLET ORAL
Qty: 30 TABLET | Refills: 5 | Status: SHIPPED | OUTPATIENT
Start: 2018-05-30 | End: 2021-09-01

## 2018-05-30 RX ORDER — LORAZEPAM 1 MG/1
1 TABLET ORAL 2 TIMES DAILY
Qty: 60 TABLET | Refills: 0 | Status: SHIPPED | OUTPATIENT
Start: 2018-05-30 | End: 2018-05-31

## 2018-05-30 RX ORDER — GUANFACINE 1 MG/1
TABLET ORAL
Qty: 60 TABLET | Refills: 0 | Status: SHIPPED | OUTPATIENT
Start: 2018-05-30

## 2018-05-30 RX ORDER — DIVALPROEX SODIUM 500 MG/1
1000 TABLET, DELAYED RELEASE ORAL 2 TIMES DAILY
Qty: 60 TABLET | Refills: 0 | Status: SHIPPED | OUTPATIENT
Start: 2018-05-30 | End: 2023-06-07

## 2018-05-30 NOTE — TELEPHONE ENCOUNTER
Gerda called the nurse for him and states that he has not received this medication yet and he has been acting up and if they don't get this she will have to have someone come and remove him from the home.  [phone]

## 2018-05-31 RX ORDER — LORAZEPAM 1 MG/1
1 TABLET ORAL 2 TIMES DAILY
Qty: 60 TABLET | Refills: 0 | Status: SHIPPED | OUTPATIENT
Start: 2018-05-31 | End: 2021-11-04

## 2018-06-04 ENCOUNTER — TRANSFERRED RECORDS (OUTPATIENT)
Dept: HEALTH INFORMATION MANAGEMENT | Facility: CLINIC | Age: 38
End: 2018-06-04

## 2018-06-06 DIAGNOSIS — E55.9 VITAMIN D DEFICIENCY: Primary | ICD-10-CM

## 2018-06-06 DIAGNOSIS — Z00.00 HEALTH CARE MAINTENANCE: ICD-10-CM

## 2018-06-07 RX ORDER — ERGOCALCIFEROL 1.25 MG/1
CAPSULE, LIQUID FILLED ORAL
Qty: 8 CAPSULE | Refills: 0 | Status: SHIPPED | OUTPATIENT
Start: 2018-06-07 | End: 2018-07-13

## 2018-06-07 NOTE — TELEPHONE ENCOUNTER
Vitamin D  Last Written Prescription Date: 4/2/18  Last Fill Quantity: 8 # of Refills: 0  Last Office Visit: 5/1/18  Patient reported medication. Please sign.

## 2018-06-07 NOTE — TELEPHONE ENCOUNTER
Due for follow up vitamin D level to see if this should be continued. Orders per Dr Velasquez, PCP

## 2018-06-29 DIAGNOSIS — Z79.899 HIGH RISK MEDICATION USE: Primary | ICD-10-CM

## 2018-06-29 LAB
ALBUMIN SERPL-MCNC: 4.1 G/DL (ref 3.4–5)
ALP SERPL-CCNC: 61 U/L (ref 40–150)
ALT SERPL W P-5'-P-CCNC: 111 U/L (ref 0–70)
AST SERPL W P-5'-P-CCNC: 83 U/L (ref 0–45)
BILIRUB DIRECT SERPL-MCNC: 0.1 MG/DL (ref 0–0.2)
BILIRUB SERPL-MCNC: 0.5 MG/DL (ref 0.2–1.3)
PROT SERPL-MCNC: 7.5 G/DL (ref 6.8–8.8)
VALPROATE SERPL-MCNC: 72 MG/L (ref 50–100)

## 2018-06-29 PROCEDURE — 80076 HEPATIC FUNCTION PANEL: CPT | Mod: ZL | Performed by: PHYSICIAN ASSISTANT

## 2018-06-29 PROCEDURE — 36415 COLL VENOUS BLD VENIPUNCTURE: CPT | Mod: ZL | Performed by: PHYSICIAN ASSISTANT

## 2018-06-29 PROCEDURE — 80164 ASSAY DIPROPYLACETIC ACD TOT: CPT | Mod: ZL | Performed by: PHYSICIAN ASSISTANT

## 2018-07-12 ENCOUNTER — TELEPHONE (OUTPATIENT)
Dept: FAMILY MEDICINE | Facility: OTHER | Age: 38
End: 2018-07-12

## 2018-07-12 NOTE — TELEPHONE ENCOUNTER
1:13 PM    Reason for Call: Phone Call    Description: Gerda from [facility] called and states that there have been more episodes of sudden diarrhea staff noted dark black stools, concerned something more going on with stomach than just constipation.     Was an appointment offered for this call? No  If yes : Appointment type              Date    Preferred method for responding to this message: Telephone Call  What is your phone number ? [phone]    If we cannot reach you directly, may we leave a detailed response at the number you provided? Yes    Can this message wait until your PCP/provider returns, if available today? YES, PCP raheem Allred

## 2018-07-13 ENCOUNTER — OFFICE VISIT (OUTPATIENT)
Dept: FAMILY MEDICINE | Facility: OTHER | Age: 38
End: 2018-07-13
Attending: FAMILY MEDICINE
Payer: MEDICARE

## 2018-07-13 ENCOUNTER — RADIANT APPOINTMENT (OUTPATIENT)
Dept: GENERAL RADIOLOGY | Facility: OTHER | Age: 38
End: 2018-07-13
Attending: FAMILY MEDICINE
Payer: MEDICARE

## 2018-07-13 VITALS
DIASTOLIC BLOOD PRESSURE: 82 MMHG | TEMPERATURE: 98.5 F | OXYGEN SATURATION: 95 % | WEIGHT: 254.4 LBS | HEART RATE: 107 BPM | BODY MASS INDEX: 34.99 KG/M2 | SYSTOLIC BLOOD PRESSURE: 115 MMHG

## 2018-07-13 DIAGNOSIS — K59.01 SLOW TRANSIT CONSTIPATION: ICD-10-CM

## 2018-07-13 DIAGNOSIS — K59.01 SLOW TRANSIT CONSTIPATION: Primary | ICD-10-CM

## 2018-07-13 PROCEDURE — G0463 HOSPITAL OUTPT CLINIC VISIT: HCPCS

## 2018-07-13 PROCEDURE — 74019 RADEX ABDOMEN 2 VIEWS: CPT | Mod: TC

## 2018-07-13 PROCEDURE — 99213 OFFICE O/P EST LOW 20 MIN: CPT | Performed by: FAMILY MEDICINE

## 2018-07-13 RX ORDER — CHOLECALCIFEROL (VITAMIN D3) 50 MCG
1 TABLET ORAL DAILY
COMMUNITY
End: 2018-11-06

## 2018-07-13 RX ORDER — AMOXICILLIN 250 MG
1 CAPSULE ORAL 2 TIMES DAILY
Qty: 60 TABLET | Refills: 1 | Status: SHIPPED | OUTPATIENT
Start: 2018-07-13 | End: 2018-10-03

## 2018-07-13 ASSESSMENT — PAIN SCALES - GENERAL: PAINLEVEL: MILD PAIN (2)

## 2018-07-13 NOTE — NURSING NOTE
"Chief Complaint   Patient presents with     Constipation     bowel issues       Initial /82 (BP Location: Right arm, Patient Position: Chair, Cuff Size: Adult Regular)  Pulse 107  Temp 98.5  F (36.9  C) (Tympanic)  Wt 254 lb 6.4 oz (115.4 kg)  SpO2 95%  BMI 34.99 kg/m2 Estimated body mass index is 34.99 kg/(m^2) as calculated from the following:    Height as of 5/1/18: 5' 11.5\" (1.816 m).    Weight as of this encounter: 254 lb 6.4 oz (115.4 kg).  Medication Reconciliation: complete    Roz Mccormick    "
Statement Selected

## 2018-07-13 NOTE — PROGRESS NOTES
SUBJECTIVE:   Casey Pedro is a 37 year old male who presents to clinic today for the following health issues:    Constipation      Duration: Several months    Description:       Frequency of bowel movements: Unsure, several a week       Consistency of stool: Liquid when episodes on incontinence     Intensity:  moderate    Accompanying signs and symptoms: Painful        Abdominal pain: YES       Rectal pain: no        Blood in stool: no - 1 encounter of black stool       Nausea/vomitting: no     History:        Similar problems in past: YES - came in prior for same reason in the past    Precipitating or alleviating factors: None       Medications worsening symptoms: no - caregiver says may be cause of pain     Therapies tried and outcome: None       Chronic laxative use: docusate twice daily, prunes with dinner     Casey has a history of chronic constipation. He has had episodes of melena and now intermittent diarrhea with incontinence.      Problem list and histories reviewed & adjusted, as indicated.  Additional history: as documented    Patient Active Problem List   Diagnosis     BPH (begnign prostatic hyperplasia)     Mental retardation     Autism     Annual NHS Examination     Disruptive behavior disorder     Seasonal allergic rhinitis     Mild intermittent asthma without complication     Slow transit constipation     History reviewed. No pertinent surgical history.    Social History   Substance Use Topics     Smoking status: Never Smoker     Smokeless tobacco: Never Used      Comment: no passive exposure     Alcohol use No     Family History   Problem Relation Age of Onset     Alcohol/Drug Father      alcoholism         Current Outpatient Prescriptions   Medication Sig Dispense Refill     albuterol (PROAIR HFA/PROVENTIL HFA/VENTOLIN HFA) 108 (90 BASE) MCG/ACT Inhaler Inhale 2 puffs into the lungs every 6 hours as needed for shortness of breath / dyspnea or wheezing 1 Inhaler 0     ALL DAY ALLERGY 10 MG  tablet TAKE ONE (1) TABLET BY MOUTH DAILY 90 tablet 2     Cholecalciferol (VITAMIN D) 2000 units tablet Take 1 tablet by mouth daily       divalproex sodium delayed-release (DEPAKOTE) 500 MG DR tablet Take 2 tablets (1,000 mg) by mouth 2 times daily 60 tablet 0      MG capsule TAKE ONE (1) CAPSULE BY MOUTH TWICE DAILY 100 capsule 10     fish oil-omega-3 fatty acids 1000 MG capsule TAKE TWO (2) CAPSULES BY MOUTH DAILY 100 capsule 10     guanFACINE (TENEX) 1 MG tablet Take 1 tablet by mouth twice a day Take one tablet by mouth every day at 8 am and one tablet by mouth every day at 5 pm 60 tablet 0     LORazepam (ATIVAN) 1 MG tablet Take 1 tablet (1 mg) by mouth 2 times daily 60 tablet 0     OLANZapine (ZYPREXA) 10 MG tablet Take 1 tablet by mouth once a day Take one tablet by mouth every day at 7 am 30 tablet 5     OLANZapine (ZYPREXA) 20 MG tablet Take 20 mg by mouth daily Take with the 10mg.       OLANZAPINE PO Take 5 mg by mouth daily 11:00am       OLANZAPINE PO Take 5 mg by mouth 5mg tab dissolvable PRN       omeprazole (PRILOSEC) 40 MG capsule Take 1 capsule (40 mg) by mouth daily Take 30-60 minutes before a meal. 30 capsule 6     VITAMIN D, CHOLECALCIFEROL, PO Take 50,000 Units by mouth every 7 days       No Known Allergies    Reviewed and updated as needed this visit by clinical staff       Reviewed and updated as needed this visit by Provider         ROS:  Constitutional, HEENT, cardiovascular, pulmonary, gi and gu systems are negative, except as otherwise noted.    OBJECTIVE:     Wt 254 lb 6.4 oz (115.4 kg)  BMI 34.99 kg/m2  Body mass index is 34.99 kg/(m^2).  Physical Exam   Constitutional: He is oriented to person, place, and time. He appears well-developed and well-nourished. No distress.   Abdominal: Soft. Bowel sounds are normal. He exhibits no distension and no mass. There is no tenderness.   Neurological: He is alert and oriented to person, place, and time.   Psychiatric: He has a normal mood  and affect.         Diagnostic Test Results:  X rays show a large amount of stool     ASSESSMENT/PLAN:     Slow transit constipation  Will switch to Senakot S.  Mag Citrate for acute laxative.  Bowel habits discussed.  Follow up 1 month.  - XR ABDOMEN 2 VW (Clinic Performed); Future  - senna-docusate (SENOKOT-S;PERICOLACE) 8.6-50 MG per tablet; Take 1 tablet by mouth 2 times daily  - magnesium citrate solution; Take 296 mLs by mouth once for 1 dose      Berny Velasquez MD  [facility]

## 2018-07-13 NOTE — MR AVS SNAPSHOT
After Visit Summary   7/13/2018    Casey Pedro    MRN: [id number]           Patient Information     Date Of Birth          1980        Visit Information        Provider Department      7/13/2018 2:20 PM Berny Velasquez MD [facility]        Today's Diagnoses     Slow transit constipation    -  1      Care Instructions    Take Senekot S twice daily          Follow-ups after your visit        Follow-up notes from your care team     Return in about 1 month (around 8/13/2018) for Follow up visit.      Your next 10 appointments already scheduled     Jul 30, 2018  9:40 AM CDT   (Arrive by 9:25 AM)   SHORT with Berny Velasquez MD   [facility] [place] ([facility] [place] )    [address]  [place] [address] [phone]              Who to contact     If you have questions or need follow up information about today's clinic visit or your schedule please contact [facility] [place] directly at [phone].  Normal or non-critical lab and imaging results will be communicated to you by MyChart, letter or phone within 4 business days after the clinic has received the results. If you do not hear from us within 7 days, please contact the clinic through [employer] or phone. If you have a critical or abnormal lab result, we will notify you by phone as soon as possible.  Submit refill requests through [employer] or call your pharmacy and they will forward the refill request to us. Please allow 3 business days for your refill to be completed.          Additional Information About Your Visit        Care EveryWhere ID     This is your Care EveryWhere ID. This could be used by other organizations to access your [facility] medical records  [id number]        Your Vitals Were     Pulse Temperature Pulse Oximetry BMI (Body Mass Index)          107 98.5  F (36.9  C) (Tympanic) 95% 34.99 kg/m2         Blood Pressure from Last 3 Encounters:   07/13/18 115/82   05/01/18 126/80    03/12/18 122/78    Weight from Last 3 Encounters:   07/13/18 254 lb 6.4 oz (115.4 kg)   05/01/18 254 lb 6.4 oz (115.4 kg)   02/20/18 243 lb 8 oz (110.5 kg)                 Today's Medication Changes          These changes are accurate as of 7/13/18 11:59 PM.  If you have any questions, ask your nurse or doctor.               Start taking these medicines.        Dose/Directions    magnesium citrate solution   Used for:  Slow transit constipation   Started by:  Berny Velasquez MD        Dose:  296 mL   Take 296 mLs by mouth once for 1 dose   Quantity:  296 mL   Refills:  0       senna-docusate 8.6-50 MG per tablet   Commonly known as:  SENOKOT-S;PERICOLACE   Used for:  Slow transit constipation   Started by:  Berny Velasquez MD        Dose:  1 tablet   Take 1 tablet by mouth 2 times daily   Quantity:  60 tablet   Refills:  1            Where to get your medicines      These medications were sent to [facility] [place] [address] [address]     Phone:  [phone]     magnesium citrate solution    senna-docusate 8.6-50 MG per tablet                Primary Care Provider Office Phone # Fax #    Berny Velasquez [phone]       [facility] [address]        Equal Access to Services     [facility]: Hadii jorge vazquez hadcydneyo Sojimmie, waaxda luqadaha, qaybta kaalmada mera, brent magallon . [facility] [phone].    ATENCIÓN: Si habla español, tiene a saunders disposición servicios gratuitos de asistencia lingüística. Juan J al [phone].    We comply with applicable federal civil rights laws and [place] laws. We do not discriminate on the basis of race, color, national origin, age, disability, sex, sexual orientation, or gender identity.            Thank you!     Thank you for choosing [facility]  for your care. Our goal is always to provide you with excellent care. Hearing back  from our patients is one way we can continue to improve our services. Please take a few minutes to complete the written survey that you may receive in the mail after your visit with us. Thank you!             Your Updated Medication List - Protect others around you: Learn how to safely use, store and throw away your medicines at www.disposemymeds.org.          This list is accurate as of 7/13/18 11:59 PM.  Always use your most recent med list.                   Brand Name Dispense Instructions for use Diagnosis    albuterol 108 (90 Base) MCG/ACT Inhaler    PROAIR HFA/PROVENTIL HFA/VENTOLIN HFA    1 Inhaler    Inhale 2 puffs into the lungs every 6 hours as needed for shortness of breath / dyspnea or wheezing    Acute bronchitis, unspecified organism       ALL DAY ALLERGY 10 MG tablet   Generic drug:  cetirizine     90 tablet    TAKE ONE (1) TABLET BY MOUTH DAILY    Chronic seasonal allergic rhinitis, unspecified trigger       divalproex sodium delayed-release 500 MG DR tablet    DEPAKOTE    60 tablet    Take 2 tablets (1,000 mg) by mouth 2 times daily    Autism        MG capsule   Generic drug:  docusate sodium     100 capsule    TAKE ONE (1) CAPSULE BY MOUTH TWICE DAILY    Slow transit constipation       fish oil-omega-3 fatty acids 1000 MG capsule     100 capsule    TAKE TWO (2) CAPSULES BY MOUTH DAILY    Health care maintenance       guanFACINE 1 MG tablet    TENEX    60 tablet    Take 1 tablet by mouth twice a day Take one tablet by mouth every day at 8 am and one tablet by mouth every day at 5 pm    Autism       LORazepam 1 MG tablet    ATIVAN    60 tablet    Take 1 tablet (1 mg) by mouth 2 times daily    Autism       magnesium citrate solution     296 mL    Take 296 mLs by mouth once for 1 dose    Slow transit constipation       omeprazole 40 MG capsule    priLOSEC    30 capsule    Take 1 capsule (40 mg) by mouth daily Take 30-60 minutes before a meal.    Gastroesophageal reflux disease without  esophagitis, Disruptive behavior disorder       senna-docusate 8.6-50 MG per tablet    SENOKOT-S;PERICOLACE    60 tablet    Take 1 tablet by mouth 2 times daily    Slow transit constipation       * VITAMIN D (CHOLECALCIFEROL) PO      Take 50,000 Units by mouth every 7 days        * vitamin D 2000 units tablet      Take 1 tablet by mouth daily        * ZYPREXA 20 MG tablet   Generic drug:  OLANZapine      Take 20 mg by mouth daily Take with the 10mg.        * OLANZAPINE PO      Take 5 mg by mouth 5mg tab dissolvable PRN        * OLANZAPINE PO      Take 5 mg by mouth daily 11:00am        * OLANZapine 10 MG tablet    zyPREXA    30 tablet    Take 1 tablet by mouth once a day Take one tablet by mouth every day at 7 am    Disruptive behavior disorder       * Notice:  This list has 6 medication(s) that are the same as other medications prescribed for you. Read the directions carefully, and ask your doctor or other care provider to review them with you.

## 2018-07-16 ENCOUNTER — TELEPHONE (OUTPATIENT)
Dept: FAMILY MEDICINE | Facility: OTHER | Age: 38
End: 2018-07-16

## 2018-07-16 NOTE — TELEPHONE ENCOUNTER
11:20 AM    Reason for Call: Phone Call    Description: [facility] called and states that she would like a call back regarding some of his medications being inafective    Was an appointment offered for this call? No  If yes : Appointment type              Date    Preferred method for responding to this message: Telephone Call  What is your phone number ?[phone]    If we cannot reach you directly, may we leave a detailed response at the number you provided? Yes    Can this message wait until your PCP/provider returns, if available today? Not applicable[facility]

## 2018-07-17 NOTE — TELEPHONE ENCOUNTER
Received call regarding the mag citrate you ordered and has produced no results. Please advise as to next step.

## 2018-07-18 NOTE — TELEPHONE ENCOUNTER
Notified Gerda that it was approved to take the mag citrate in am and repeat same day at 1pm if no results.

## 2018-07-18 NOTE — TELEPHONE ENCOUNTER
Patients homecare would like to know if they can do the mag in the morning  And if no results could repeat around 1pm the same day?

## 2018-07-25 ENCOUNTER — TELEPHONE (OUTPATIENT)
Dept: FAMILY MEDICINE | Facility: OTHER | Age: 38
End: 2018-07-25

## 2018-07-25 NOTE — TELEPHONE ENCOUNTER
3:57 PM    Reason for Call: Phone Call    Description: Nurse Gerda called back regarding this pt having BM issues. Still having issues after pt's taken Milk of Magnesia and Magnesium Citrate. Nurse would like call back with further orders.    Was an appointment offered for this call? No  If yes : Appointment type              Date    Preferred method for responding to this message: Telephone Call  What is your phone number ? [phone]     If we cannot reach you directly, may we leave a detailed response at the number you provided? Yes    Can this message wait until your PCP/provider returns, if available today? Not applicable, provider is in today    Melanie Pacheco

## 2018-07-26 NOTE — TELEPHONE ENCOUNTER
Please advise. Patient had 2 doses of mag citrate and also 2 doses of milk of mag with only 1 sm bm. Nurse would like to know what the next step is considering minimal results.

## 2018-07-30 ENCOUNTER — RADIANT APPOINTMENT (OUTPATIENT)
Dept: GENERAL RADIOLOGY | Facility: OTHER | Age: 38
End: 2018-07-30
Attending: FAMILY MEDICINE
Payer: MEDICARE

## 2018-07-30 ENCOUNTER — OFFICE VISIT (OUTPATIENT)
Dept: FAMILY MEDICINE | Facility: OTHER | Age: 38
End: 2018-07-30
Attending: FAMILY MEDICINE
Payer: MEDICARE

## 2018-07-30 VITALS
TEMPERATURE: 97.4 F | BODY MASS INDEX: 20.9 KG/M2 | WEIGHT: 152 LBS | HEART RATE: 109 BPM | DIASTOLIC BLOOD PRESSURE: 72 MMHG | SYSTOLIC BLOOD PRESSURE: 110 MMHG | OXYGEN SATURATION: 96 %

## 2018-07-30 DIAGNOSIS — K59.01 SLOW TRANSIT CONSTIPATION: Primary | ICD-10-CM

## 2018-07-30 DIAGNOSIS — K59.01 SLOW TRANSIT CONSTIPATION: ICD-10-CM

## 2018-07-30 DIAGNOSIS — Z00.00 HEALTH CARE MAINTENANCE: ICD-10-CM

## 2018-07-30 PROCEDURE — 99213 OFFICE O/P EST LOW 20 MIN: CPT | Performed by: FAMILY MEDICINE

## 2018-07-30 PROCEDURE — 74019 RADEX ABDOMEN 2 VIEWS: CPT | Mod: TC

## 2018-07-30 PROCEDURE — G0463 HOSPITAL OUTPT CLINIC VISIT: HCPCS

## 2018-07-30 RX ORDER — ALBUTEROL SULFATE 90 UG/1
2 AEROSOL, METERED RESPIRATORY (INHALATION) EVERY 6 HOURS
COMMUNITY
End: 2019-10-11

## 2018-07-30 ASSESSMENT — PAIN SCALES - GENERAL: PAINLEVEL: NO PAIN (0)

## 2018-07-30 NOTE — NURSING NOTE
"Chief Complaint   Patient presents with     Constipation       Initial /72 (BP Location: Right arm, Patient Position: Right side, Cuff Size: Adult Large)  Pulse 109  Temp 97.4  F (36.3  C) (Tympanic)  Wt 152 lb (68.9 kg)  SpO2 96%  BMI 20.9 kg/m2 Estimated body mass index is 20.9 kg/(m^2) as calculated from the following:    Height as of 5/1/18: 5' 11.5\" (1.816 m).    Weight as of this encounter: 152 lb (68.9 kg).  Medication Reconciliation: complete       Payton Pulliam LPN    "

## 2018-07-30 NOTE — PROGRESS NOTES
SUBJECTIVE:   Casey Pedro is a 37 year old male who presents to clinic today for the following health issues:    Constipation      Duration: Several months    Description:       Frequency of bowel movements: Unsure, several a week       Consistency of stool: Liquid when episodes on incontinence     Intensity:  moderate    Accompanying signs and symptoms: Painful        Abdominal pain: YES       Rectal pain: no        Blood in stool: no - 1 encounter of black stool       Nausea/vomitting: no     History:        Similar problems in past: YES - came in prior for same reason in the past    Precipitating or alleviating factors: None       Medications worsening symptoms: no - caregiver says may be cause of pain     Therapies tried and outcome: None       Chronic laxative use: docusate twice daily, prunes with dinner     Casey has a history of chronic constipation. He has had episodes of melena and now intermittent diarrhea with incontinence.      Problem list and histories reviewed & adjusted, as indicated.  Additional history: as documented    Patient Active Problem List   Diagnosis     BPH (begnign prostatic hyperplasia)     Mental retardation     Autism     Annual NHS Examination     Disruptive behavior disorder     Seasonal allergic rhinitis     Mild intermittent asthma without complication     Slow transit constipation     No past surgical history on file.    Social History   Substance Use Topics     Smoking status: Never Smoker     Smokeless tobacco: Never Used      Comment: no passive exposure     Alcohol use No     Family History   Problem Relation Age of Onset     Alcohol/Drug Father      alcoholism         Current Outpatient Prescriptions   Medication Sig Dispense Refill     albuterol (PROAIR HFA/PROVENTIL HFA/VENTOLIN HFA) 108 (90 BASE) MCG/ACT Inhaler Inhale 2 puffs into the lungs every 6 hours as needed for shortness of breath / dyspnea or wheezing 1 Inhaler 0     ALL DAY ALLERGY 10 MG tablet TAKE ONE  (1) TABLET BY MOUTH DAILY 90 tablet 2     Cholecalciferol (VITAMIN D) 2000 units tablet Take 1 tablet by mouth daily       divalproex sodium delayed-release (DEPAKOTE) 500 MG DR tablet Take 2 tablets (1,000 mg) by mouth 2 times daily 60 tablet 0      MG capsule TAKE ONE (1) CAPSULE BY MOUTH TWICE DAILY 100 capsule 10     fish oil-omega-3 fatty acids 1000 MG capsule TAKE TWO (2) CAPSULES BY MOUTH DAILY 100 capsule 10     guanFACINE (TENEX) 1 MG tablet Take 1 tablet by mouth twice a day Take one tablet by mouth every day at 8 am and one tablet by mouth every day at 5 pm 60 tablet 0     LORazepam (ATIVAN) 1 MG tablet Take 1 tablet (1 mg) by mouth 2 times daily 60 tablet 0     OLANZapine (ZYPREXA) 10 MG tablet Take 1 tablet by mouth once a day Take one tablet by mouth every day at 7 am 30 tablet 5     OLANZapine (ZYPREXA) 20 MG tablet Take 20 mg by mouth daily Take with the 10mg.       OLANZAPINE PO Take 5 mg by mouth daily 11:00am       OLANZAPINE PO Take 5 mg by mouth 5mg tab dissolvable PRN       omeprazole (PRILOSEC) 40 MG capsule Take 1 capsule (40 mg) by mouth daily Take 30-60 minutes before a meal. 30 capsule 6     senna-docusate (SENOKOT-S;PERICOLACE) 8.6-50 MG per tablet Take 1 tablet by mouth 2 times daily 60 tablet 1     VITAMIN D, CHOLECALCIFEROL, PO Take 50,000 Units by mouth every 7 days       No Known Allergies    Reviewed and updated as needed this visit by clinical staff       Reviewed and updated as needed this visit by Provider         ROS:  Constitutional, HEENT, cardiovascular, pulmonary, gi and gu systems are negative, except as otherwise noted.    OBJECTIVE:     There were no vitals taken for this visit.  There is no height or weight on file to calculate BMI.  Physical Exam   Constitutional: He is oriented to person, place, and time. He appears well-developed and well-nourished. No distress.   Abdominal: Soft. Bowel sounds are normal. He exhibits no distension and no mass. There is no  tenderness.   Neurological: He is alert and oriented to person, place, and time.   Psychiatric: He has a normal mood and affect.         Diagnostic Test Results:  X rays show a large amount of stool     ASSESSMENT/PLAN:     Slow transit constipation  Repeat xray shows improvement in bowel gas pattern.  Will continue Senekot S with Mag Citrate as needed for no BM 3 days.  Follow up 3 months.  - XR ABDOMEN 2 VW (Clinic Performed); Future          Berny Velasquez MD  [facility]

## 2018-07-30 NOTE — MR AVS SNAPSHOT
After Visit Summary   7/30/2018    Casey Pedro    MRN: [id number]           Patient Information     Date Of Birth          1980        Visit Information        Provider Department      7/30/2018 9:40 AM Berny Velasquez MD [facility]        Today's Diagnoses     Slow transit constipation    -  1      Care Instructions    Continue same medications.            Follow-ups after your visit        Follow-up notes from your care team     Return in about 3 months (around 10/30/2018) for follow up visit constipation.      Who to contact     If you have questions or need follow up information about today's clinic visit or your schedule please contact [facility] directly at [phone].  Normal or non-critical lab and imaging results will be communicated to you by MyChart, letter or phone within 4 business days after the clinic has received the results. If you do not hear from us within 7 days, please contact the clinic through MyChart or phone. If you have a critical or abnormal lab result, we will notify you by phone as soon as possible.  Submit refill requests through [employer] or call your pharmacy and they will forward the refill request to us. Please allow 3 business days for your refill to be completed.          Additional Information About Your Visit        Care EveryWhere ID     This is your Care EveryWhere ID. This could be used by other organizations to access your [facility] medical records  [id number]        Your Vitals Were     Pulse Temperature Pulse Oximetry BMI (Body Mass Index)          109 97.4  F (36.3  C) (Tympanic) 96% 20.9 kg/m2         Blood Pressure from Last 3 Encounters:   07/30/18 110/72   07/13/18 115/82   05/01/18 126/80    Weight from Last 3 Encounters:   07/30/18 152 lb (68.9 kg)   07/13/18 254 lb 6.4 oz (115.4 kg)   05/01/18 254 lb 6.4 oz (115.4 kg)                 Today's Medication Changes          These changes are accurate as of 7/30/18  11:59 PM.  If you have any questions, ask your nurse or doctor.               These medicines have changed or have updated prescriptions.        Dose/Directions    albuterol 108 (90 Base) MCG/ACT Inhaler   Commonly known as:  PROAIR HFA/PROVENTIL HFA/VENTOLIN HFA   This may have changed:  Another medication with the same name was removed. Continue taking this medication, and follow the directions you see here.   Changed by:  Berny Velasquez MD        Dose:  2 puff   Inhale 2 puffs into the lungs every 6 hours   Refills:  0         Stop taking these medicines if you haven't already. Please contact your care team if you have questions.      MG capsule   Generic drug:  docusate sodium   Stopped by:  Berny Velasquez MD                    Primary Care Provider Office Phone # Fax #    Berny Velasquez [phone] [address] [id number]        Equal Access to Services     [facility]: Hadii jorge vazquez hadasho Soomaali, waaxda luqadaha, qaybta kaalmada adeegyada, brent lee hayalaina magallon . [facility] [phone].    ATENCIÓN: Si habla español, tiene a saunders disposición servicios gratuitos de asistencia lingüística. Llame al [phone].    We comply with applicable federal civil rights laws and [place] laws. We do not discriminate on the basis of race, color, national origin, age, disability, sex, sexual orientation, or gender identity.            Thank you!     Thank you for choosing [facility]  for your care. Our goal is always to provide you with excellent care. Hearing back from our patients is one way we can continue to improve our services. Please take a few minutes to complete the written survey that you may receive in the mail after your visit with us. Thank you!             Your Updated Medication List - Protect others around you: Learn how to safely use, store and throw away your medicines at www.disposemymeds.org.          This list is  accurate as of 7/30/18 11:59 PM.  Always use your most recent med list.                   Brand Name Dispense Instructions for use Diagnosis    albuterol 108 (90 Base) MCG/ACT Inhaler    PROAIR HFA/PROVENTIL HFA/VENTOLIN HFA     Inhale 2 puffs into the lungs every 6 hours        ALL DAY ALLERGY 10 MG tablet   Generic drug:  cetirizine     90 tablet    TAKE ONE (1) TABLET BY MOUTH DAILY    Chronic seasonal allergic rhinitis, unspecified trigger       divalproex sodium delayed-release 500 MG DR tablet    DEPAKOTE    60 tablet    Take 2 tablets (1,000 mg) by mouth 2 times daily    Autism       fish oil-omega-3 fatty acids 1000 MG capsule     100 capsule    TAKE TWO (2) CAPSULES BY MOUTH DAILY    Health care maintenance       guanFACINE 1 MG tablet    TENEX    60 tablet    Take 1 tablet by mouth twice a day Take one tablet by mouth every day at 8 am and one tablet by mouth every day at 5 pm    Autism       LORazepam 1 MG tablet    ATIVAN    60 tablet    Take 1 tablet (1 mg) by mouth 2 times daily    Autism       omeprazole 40 MG capsule    priLOSEC    30 capsule    Take 1 capsule (40 mg) by mouth daily Take 30-60 minutes before a meal.    Gastroesophageal reflux disease without esophagitis, Disruptive behavior disorder       senna-docusate 8.6-50 MG per tablet    SENOKOT-S;PERICOLACE    60 tablet    Take 1 tablet by mouth 2 times daily    Slow transit constipation       * VITAMIN D (CHOLECALCIFEROL) PO      Take 50,000 Units by mouth every 7 days        * vitamin D 2000 units tablet      Take 1 tablet by mouth daily        * ZYPREXA 20 MG tablet   Generic drug:  OLANZapine      Take 20 mg by mouth daily Take with the 10mg.        * OLANZAPINE PO      Take 5 mg by mouth 5mg tab dissolvable PRN        * OLANZAPINE PO      Take 5 mg by mouth daily 11:00am        * OLANZapine 10 MG tablet    zyPREXA    30 tablet    Take 1 tablet by mouth once a day Take one tablet by mouth every day at 7 am    Disruptive behavior disorder        * Notice:  This list has 6 medication(s) that are the same as other medications prescribed for you. Read the directions carefully, and ask your doctor or other care provider to review them with you.

## 2018-07-31 RX ORDER — ERGOCALCIFEROL 1.25 MG/1
CAPSULE, LIQUID FILLED ORAL
Qty: 8 CAPSULE | Refills: 0 | Status: SHIPPED | OUTPATIENT
Start: 2018-07-31 | End: 2018-09-27 | Stop reason: ALTCHOICE

## 2018-07-31 NOTE — TELEPHONE ENCOUNTER
Vitamin D 50,000u - patient reported med. Please advise  Last visit: 7.30.18      Vitamin D Deficiency screening 12 (L) 20 - 75 ug/L Final 01/09/2018 11:46 AM 51

## 2018-08-07 ENCOUNTER — TELEPHONE (OUTPATIENT)
Dept: FAMILY MEDICINE | Facility: OTHER | Age: 38
End: 2018-08-07

## 2018-08-07 DIAGNOSIS — K21.9 GASTROESOPHAGEAL REFLUX DISEASE WITHOUT ESOPHAGITIS: ICD-10-CM

## 2018-08-07 DIAGNOSIS — F91.9 DISRUPTIVE BEHAVIOR DISORDER: ICD-10-CM

## 2018-08-07 NOTE — TELEPHONE ENCOUNTER
Gerda from [facility] would like to speak with nurse or provider in regards to acid reflux treatment. She received note from his doctor that he has a lot of acid erosion on his teeth. Patient currently is taking prilosec. Please call Gerda back at [phone] and it is ok to leave a message.

## 2018-08-09 RX ORDER — OMEPRAZOLE 40 MG/1
40 CAPSULE, DELAYED RELEASE ORAL
Qty: 60 CAPSULE | Refills: 6 | Status: SHIPPED | OUTPATIENT
Start: 2018-08-09 | End: 2019-01-14

## 2018-08-09 NOTE — TELEPHONE ENCOUNTER
Patient saw dentis and stated that he is having ongoing acid erosion on his teeth. Does not think the 40 mg daily of prilosec is working, should medication be changed to something else or increased?

## 2018-08-09 NOTE — TELEPHONE ENCOUNTER
Increase omeprozole to 40 mg bid to see if this calms down, for one month, recheck with Dr Velasquez

## 2018-09-04 ENCOUNTER — MEDICAL CORRESPONDENCE (OUTPATIENT)
Dept: HEALTH INFORMATION MANAGEMENT | Facility: CLINIC | Age: 38
End: 2018-09-04

## 2018-09-07 DIAGNOSIS — Z79.899 HIGH RISK MEDICATION USE: Primary | ICD-10-CM

## 2018-09-07 LAB
ALBUMIN SERPL-MCNC: 3.9 G/DL (ref 3.4–5)
ALP SERPL-CCNC: 68 U/L (ref 40–150)
ALT SERPL W P-5'-P-CCNC: 110 U/L (ref 0–70)
AST SERPL W P-5'-P-CCNC: 90 U/L (ref 0–45)
BILIRUB DIRECT SERPL-MCNC: 0.2 MG/DL (ref 0–0.2)
BILIRUB SERPL-MCNC: 0.5 MG/DL (ref 0.2–1.3)
PROT SERPL-MCNC: 7.5 G/DL (ref 6.8–8.8)
VALPROATE SERPL-MCNC: 84 MG/L (ref 50–100)

## 2018-09-07 PROCEDURE — 80164 ASSAY DIPROPYLACETIC ACD TOT: CPT | Mod: ZL | Performed by: FAMILY MEDICINE

## 2018-09-07 PROCEDURE — 36415 COLL VENOUS BLD VENIPUNCTURE: CPT | Mod: ZL | Performed by: FAMILY MEDICINE

## 2018-09-07 PROCEDURE — 80076 HEPATIC FUNCTION PANEL: CPT | Mod: ZL | Performed by: FAMILY MEDICINE

## 2018-09-17 NOTE — PROGRESS NOTES
SUBJECTIVE:   Casey Pedro is a 37 year old male who presents to clinic today for the following health issues:    RESPIRATORY SYMPTOMS      Duration: 4 days     Description  nasal congestion, cough, fatigue/malaise and diarrhea    Severity: moderate    Accompanying signs and symptoms: congestion    History (predisposing factors):  none    Precipitating or alleviating factors: None    Therapies tried and outcome:  Robitussin, sudafed       Problem list and histories reviewed & adjusted, as indicated.  Additional history: as documented    Patient Active Problem List   Diagnosis     BPH (begnign prostatic hyperplasia)     Mental retardation     Autism     Annual NHS Examination     Disruptive behavior disorder     Seasonal allergic rhinitis     Mild intermittent asthma without complication     Slow transit constipation     No past surgical history on file.    Social History   Substance Use Topics     Smoking status: Never Smoker     Smokeless tobacco: Never Used      Comment: no passive exposure     Alcohol use No     Family History   Problem Relation Age of Onset     Alcohol/Drug Father      alcoholism         Current Outpatient Prescriptions   Medication Sig Dispense Refill     albuterol (PROAIR HFA/PROVENTIL HFA/VENTOLIN HFA) 108 (90 Base) MCG/ACT Inhaler Inhale 2 puffs into the lungs every 6 hours       ALL DAY ALLERGY 10 MG tablet TAKE ONE (1) TABLET BY MOUTH DAILY 90 tablet 2     Cholecalciferol (VITAMIN D) 2000 units tablet Take 1 tablet by mouth daily       divalproex sodium delayed-release (DEPAKOTE) 500 MG DR tablet Take 2 tablets (1,000 mg) by mouth 2 times daily 60 tablet 0     fish oil-omega-3 fatty acids 1000 MG capsule TAKE TWO (2) CAPSULES BY MOUTH DAILY 100 capsule 10     guanFACINE (TENEX) 1 MG tablet Take 1 tablet by mouth twice a day Take one tablet by mouth every day at 8 am and one tablet by mouth every day at 5 pm 60 tablet 0     LORazepam (ATIVAN) 1 MG tablet Take 1 tablet (1 mg) by mouth 2  times daily 60 tablet 0     OLANZapine (ZYPREXA) 10 MG tablet Take 1 tablet by mouth once a day Take one tablet by mouth every day at 7 am 30 tablet 5     OLANZapine (ZYPREXA) 20 MG tablet Take 20 mg by mouth daily Take with the 10mg.       OLANZAPINE PO Take 5 mg by mouth daily 11:00am       OLANZAPINE PO Take 5 mg by mouth 5mg tab dissolvable PRN       omeprazole (PRILOSEC) 40 MG capsule Take 1 capsule (40 mg) by mouth two times daily 60 capsule 6     senna-docusate (SENOKOT-S;PERICOLACE) 8.6-50 MG per tablet Take 1 tablet by mouth 2 times daily 60 tablet 1     vitamin D (ERGOCALCIFEROL) 22112 UNIT capsule TAKE 1 CAPSULE BY MOUTH EVERY 7 DAYS FOR 8 DOSES 8 capsule 0     VITAMIN D, CHOLECALCIFEROL, PO Take 50,000 Units by mouth every 7 days       No Known Allergies    Reviewed and updated as needed this visit by clinical staff       Reviewed and updated as needed this visit by Provider         ROS:  Constitutional, HEENT, cardiovascular, pulmonary, gi and gu systems are negative, except as otherwise noted.    OBJECTIVE:     There were no vitals taken for this visit.  There is no height or weight on file to calculate BMI.  Physical Exam   Constitutional: He is oriented to person, place, and time. He appears well-developed and well-nourished. No distress.   HENT:   Head: Normocephalic.   Right Ear: Hearing, tympanic membrane, external ear and ear canal normal.   Left Ear: Hearing, tympanic membrane, external ear and ear canal normal.   Nose: Nose normal. Right sinus exhibits no maxillary sinus tenderness and no frontal sinus tenderness. Left sinus exhibits no maxillary sinus tenderness and no frontal sinus tenderness.   Mouth/Throat: Uvula is midline and oropharynx is clear and moist. No oropharyngeal exudate or posterior oropharyngeal erythema.   Eyes: Conjunctivae are normal.   Neck: Neck supple.   Pulmonary/Chest: Effort normal and breath sounds normal.   Lymphadenopathy:     He has no cervical adenopathy.    Neurological: He is alert and oriented to person, place, and time.   Skin: Skin is warm and dry.   Psychiatric: He has a normal mood and affect.         Diagnostic Test Results:  none     ASSESSMENT/PLAN:     Acute bronchitis, unspecified organism  Azithromycin (Zithromax) as written.  Continue over the counter cough suppressants and expectorants.  Follow up with persistent symptoms.  - azithromycin (ZITHROMAX) 250 MG tablet; Two tablets first day, then one tablet daily for four days.      Berny Velasquez MD  [facility] [place]

## 2018-09-19 ENCOUNTER — OFFICE VISIT (OUTPATIENT)
Dept: FAMILY MEDICINE | Facility: OTHER | Age: 38
End: 2018-09-19
Attending: FAMILY MEDICINE
Payer: MEDICARE

## 2018-09-19 VITALS
OXYGEN SATURATION: 95 % | SYSTOLIC BLOOD PRESSURE: 104 MMHG | TEMPERATURE: 96.7 F | DIASTOLIC BLOOD PRESSURE: 72 MMHG | WEIGHT: 251 LBS | BODY MASS INDEX: 34.52 KG/M2 | HEART RATE: 93 BPM

## 2018-09-19 DIAGNOSIS — Z00.00 HEALTH CARE MAINTENANCE: ICD-10-CM

## 2018-09-19 DIAGNOSIS — J20.9 ACUTE BRONCHITIS, UNSPECIFIED ORGANISM: Primary | ICD-10-CM

## 2018-09-19 DIAGNOSIS — E55.9 VITAMIN D DEFICIENCY: Primary | ICD-10-CM

## 2018-09-19 PROCEDURE — G0463 HOSPITAL OUTPT CLINIC VISIT: HCPCS

## 2018-09-19 PROCEDURE — 99213 OFFICE O/P EST LOW 20 MIN: CPT | Performed by: FAMILY MEDICINE

## 2018-09-19 RX ORDER — HYDROXYZINE HYDROCHLORIDE 10 MG/1
TABLET, FILM COATED ORAL
Refills: 5 | COMMUNITY
Start: 2018-09-04 | End: 2019-10-11

## 2018-09-19 RX ORDER — AZITHROMYCIN 250 MG/1
TABLET, FILM COATED ORAL
Qty: 6 TABLET | Refills: 0 | Status: SHIPPED | OUTPATIENT
Start: 2018-09-19 | End: 2018-11-06

## 2018-09-19 NOTE — MR AVS SNAPSHOT
After Visit Summary   9/19/2018    Casey Pedro    MRN: [id number]           Patient Information     Date Of Birth          1980        Visit Information        Provider Department      9/19/2018 10:40 AM Berny Velasquez MD [facility]        Today's Diagnoses     Acute bronchitis, unspecified organism    -  1      Care Instructions    Take azithromycin (Zithromax) as directed          Follow-ups after your visit        Follow-up notes from your care team     Return if symptoms worsen or fail to improve.      Who to contact     If you have questions or need follow up information about today's clinic visit or your schedule please contact [facility] directly at [phone].  Normal or non-critical lab and imaging results will be communicated to you by MyChart, letter or phone within 4 business days after the clinic has received the results. If you do not hear from us within 7 days, please contact the clinic through MyChart or phone. If you have a critical or abnormal lab result, we will notify you by phone as soon as possible.  Submit refill requests through You[employer] or call your pharmacy and they will forward the refill request to us. Please allow 3 business days for your refill to be completed.          Additional Information About Your Visit        Care EveryWhere ID     This is your Care EveryWhere ID. This could be used by other organizations to access your [place] medical records  [id number]        Your Vitals Were     Pulse Temperature Pulse Oximetry BMI (Body Mass Index)          93 96.7  F (35.9  C) (Tympanic) 95% 34.52 kg/m2         Blood Pressure from Last 3 Encounters:   09/19/18 104/72   07/30/18 110/72   07/13/18 115/82    Weight from Last 3 Encounters:   09/19/18 251 lb (113.9 kg)   07/30/18 152 lb (68.9 kg)   07/13/18 254 lb 6.4 oz (115.4 kg)              Today, you had the following     No orders found for display         Today's  Medication Changes          These changes are accurate as of 9/19/18 10:55 AM.  If you have any questions, ask your nurse or doctor.               Start taking these medicines.        Dose/Directions    azithromycin 250 MG tablet   Commonly known as:  ZITHROMAX   Used for:  Acute bronchitis, unspecified organism   Started by:  Berny Velasquez MD        Two tablets first day, then one tablet daily for four days.   Quantity:  6 tablet   Refills:  0            Where to get your medicines      These medications were sent to [facility] [place] - [facility] [address] [place] [address]     Phone:  [phone]     azithromycin 250 MG tablet                Primary Care Provider Office Phone # Fax #    Berny Velasquez [phone] [address] [facility] [id number]        Equal Access to Services     [facility]: Hadii jorge vazquez hadasho Soomaali, waaxda luqadaha, qaybta kaalmada adeegyada, brent magallon . So [facility] [phone].    ATENCIÓN: Si habla español, tiene a saunders disposición servicios gratuitos de asistencia lingüística. [facility] [phone].    We comply with applicable federal civil rights laws and [place] laws. We do not discriminate on the basis of race, color, national origin, age, disability, sex, sexual orientation, or gender identity.            Thank you!     Thank you for choosing [facility]  for your care. Our goal is always to provide you with excellent care. Hearing back from our patients is one way we can continue to improve our services. Please take a few minutes to complete the written survey that you may receive in the mail after your visit with us. Thank you!             Your Updated Medication List - Protect others around you: Learn how to safely use, store and throw away your medicines at www.disposemymeds.org.          This list is accurate as of 9/19/18 10:55 AM.  Always  use your most recent med list.                   Brand Name Dispense Instructions for use Diagnosis    albuterol 108 (90 Base) MCG/ACT inhaler    PROAIR HFA/PROVENTIL HFA/VENTOLIN HFA     Inhale 2 puffs into the lungs every 6 hours        ALL DAY ALLERGY 10 MG tablet   Generic drug:  cetirizine     90 tablet    TAKE ONE (1) TABLET BY MOUTH DAILY    Chronic seasonal allergic rhinitis, unspecified trigger       azithromycin 250 MG tablet    ZITHROMAX    6 tablet    Two tablets first day, then one tablet daily for four days.    Acute bronchitis, unspecified organism       divalproex sodium delayed-release 500 MG DR tablet    DEPAKOTE    60 tablet    Take 2 tablets (1,000 mg) by mouth 2 times daily    Autism       fish oil-omega-3 fatty acids 1000 MG capsule     100 capsule    TAKE TWO (2) CAPSULES BY MOUTH DAILY    Health care maintenance       guanFACINE 1 MG tablet    TENEX    60 tablet    Take 1 tablet by mouth twice a day Take one tablet by mouth every day at 8 am and one tablet by mouth every day at 5 pm    Autism       hydrOXYzine 10 MG tablet    ATARAX     take by mouth 1 TAB EVERY 2 HOURS AS NEEDED FOR ANXIETY/ AGITATION - MAX 6 TABS PER DAY        LORazepam 1 MG tablet    ATIVAN    60 tablet    Take 1 tablet (1 mg) by mouth 2 times daily    Autism       omeprazole 40 MG capsule    priLOSEC    60 capsule    Take 1 capsule (40 mg) by mouth two times daily    Gastroesophageal reflux disease without esophagitis, Disruptive behavior disorder       senna-docusate 8.6-50 MG per tablet    SENOKOT-S;PERICOLACE    60 tablet    Take 1 tablet by mouth 2 times daily    Slow transit constipation       * VITAMIN D (CHOLECALCIFEROL) PO      Take 50,000 Units by mouth every 7 days        * vitamin D 2000 units tablet      Take 1 tablet by mouth daily        vitamin D 99314 UNIT capsule    ERGOCALCIFEROL    8 capsule    TAKE 1 CAPSULE BY MOUTH EVERY 7 DAYS FOR 8 DOSES    Health care maintenance       * ZYPREXA 20 MG tablet    Generic drug:  OLANZapine      Take 20 mg by mouth daily Take with the 10mg.        * OLANZAPINE PO      Take 5 mg by mouth 5mg tab dissolvable PRN        * OLANZAPINE PO      Take 5 mg by mouth daily 11:00am        * OLANZapine 10 MG tablet    zyPREXA    30 tablet    Take 1 tablet by mouth once a day Take one tablet by mouth every day at 7 am    Disruptive behavior disorder       * Notice:  This list has 6 medication(s) that are the same as other medications prescribed for you. Read the directions carefully, and ask your doctor or other care provider to review them with you.

## 2018-09-19 NOTE — NURSING NOTE
"Chief Complaint   Patient presents with     Cough       Initial /72 (BP Location: Left arm, Patient Position: Chair, Cuff Size: Adult Large)  Pulse 93  Temp 96.7  F (35.9  C) (Tympanic)  Wt 251 lb (113.9 kg)  SpO2 95%  BMI 34.52 kg/m2 Estimated body mass index is 34.52 kg/(m^2) as calculated from the following:    Height as of 5/1/18: 5' 11.5\" (1.816 m).    Weight as of this encounter: 251 lb (113.9 kg).  Medication Reconciliation: complete    LAURITA GRADY LPN  "

## 2018-09-20 NOTE — TELEPHONE ENCOUNTER
vitamin D (ERGOCALCIFEROL) 94829 UNIT capsule    Last refill date 7/31/18    Last office visit 9/19/18

## 2018-09-21 RX ORDER — ERGOCALCIFEROL 1.25 MG/1
CAPSULE, LIQUID FILLED ORAL
Qty: 8 CAPSULE | Refills: 0 | OUTPATIENT
Start: 2018-09-21

## 2018-09-26 DIAGNOSIS — Z00.00 HEALTH CARE MAINTENANCE: ICD-10-CM

## 2018-09-27 RX ORDER — ERGOCALCIFEROL 1.25 MG/1
CAPSULE, LIQUID FILLED ORAL
Qty: 8 CAPSULE | OUTPATIENT
Start: 2018-09-27

## 2018-09-27 RX ORDER — CHOLECALCIFEROL (VITAMIN D3) 50 MCG
2000 TABLET ORAL DAILY
Qty: 100 TABLET | Refills: 3 | Status: SHIPPED | OUTPATIENT
Start: 2018-09-27 | End: 2019-12-12

## 2018-09-27 NOTE — TELEPHONE ENCOUNTER
Received call from Carly at [facility].  Her E-mar is incorrect the Vitamin D change.  She needs documentation of this change so she can take Vitamin D 50,000 units off her med list.  Please advise. Thank you

## 2018-11-05 NOTE — PROGRESS NOTES
SUBJECTIVE:   Casey Pedro is a 38 year old male who presents to clinic today for the following health issues:      Wheezing      Duration: many months    Description (location/character/radiation): Wheezing especially he eats it gets louder.      Intensity:  moderate    Accompanying signs and symptoms: loss of appetite that has going on for the last 6 months.    History (similar episodes/previous evaluation): None    Precipitating or alleviating factors: if he stops eating the noise stops    Therapies tried and outcome: inhalers, cough medicine and nothing works       Bloating      Duration: A few months staff thought it was just weight gain.    Description (location/character/radiation): no other swelling/edema noted.    Intensity:  moderate    Accompanying signs and symptoms: n/a    History (similar episodes/previous evaluation): None    Precipitating or alleviating factors: n/a    Therapies tried and outcome: None       Problem list and histories reviewed & adjusted, as indicated.  Additional history: as documented    Patient Active Problem List   Diagnosis     BPH (begnign prostatic hyperplasia)     Mental retardation     Autism     Annual NHS Examination     Disruptive behavior disorder     Seasonal allergic rhinitis     Mild intermittent asthma without complication     Slow transit constipation     No past surgical history on file.    Social History   Substance Use Topics     Smoking status: Never Smoker     Smokeless tobacco: Never Used      Comment: no passive exposure     Alcohol use No     Family History   Problem Relation Age of Onset     Alcohol/Drug Father      alcoholism         Current Outpatient Prescriptions   Medication Sig Dispense Refill     albuterol (PROAIR HFA/PROVENTIL HFA/VENTOLIN HFA) 108 (90 Base) MCG/ACT Inhaler Inhale 2 puffs into the lungs every 6 hours       ALL DAY ALLERGY 10 MG tablet TAKE ONE (1) TABLET BY MOUTH DAILY 90 tablet 2     Cholecalciferol (VITAMIN D) 2000 units  tablet Take 2,000 Units by mouth daily 100 tablet 3     divalproex sodium delayed-release (DEPAKOTE) 500 MG DR tablet Take 2 tablets (1,000 mg) by mouth 2 times daily 60 tablet 0     fish oil-omega-3 fatty acids 1000 MG capsule TAKE TWO (2) CAPSULES BY MOUTH DAILY 100 capsule 10     guanFACINE (TENEX) 1 MG tablet Take 1 tablet by mouth twice a day Take one tablet by mouth every day at 8 am and one tablet by mouth every day at 5 pm 60 tablet 0     hydrOXYzine (ATARAX) 10 MG tablet take by mouth 1 TAB EVERY 2 HOURS AS NEEDED FOR ANXIETY/ AGITATION - MAX 6 TABS PER DAY  5     LORazepam (ATIVAN) 1 MG tablet Take 1 tablet (1 mg) by mouth 2 times daily 60 tablet 0     OLANZapine (ZYPREXA) 10 MG tablet Take 1 tablet by mouth once a day Take one tablet by mouth every day at 7 am 30 tablet 5     OLANZapine (ZYPREXA) 20 MG tablet Take 20 mg by mouth daily Take with the 10mg.       OLANZAPINE PO Take 5 mg by mouth daily 11:00am       OLANZAPINE PO Take 5 mg by mouth 5mg tab dissolvable PRN       omeprazole (PRILOSEC) 40 MG capsule Take 1 capsule (40 mg) by mouth two times daily 60 capsule 6     SENEXON-S 8.6-50 MG per tablet Take 1 tablet by mouth 2 times daily 60 tablet 5     VITAMIN D, CHOLECALCIFEROL, PO Take 50,000 Units by mouth every 7 days       No Known Allergies    Reviewed and updated as needed this visit by clinical staff       Reviewed and updated as needed this visit by Provider         ROS:  Constitutional, HEENT, cardiovascular, pulmonary, gi and gu systems are negative, except as otherwise noted.    OBJECTIVE:     /78 (BP Location: Right arm, Patient Position: Sitting, Cuff Size: Adult Large)  Pulse 105  Temp 97.9  F (36.6  C) (Tympanic)  Resp 18  Wt 251 lb (113.9 kg)  SpO2 96%  BMI 34.52 kg/m2  Body mass index is 34.52 kg/(m^2).   Physical Exam   Constitutional: He is oriented to person, place, and time. He appears well-developed and well-nourished. No distress.   HENT:   Head: Normocephalic.    Right Ear: Hearing, tympanic membrane, external ear and ear canal normal.   Left Ear: Hearing, tympanic membrane, external ear and ear canal normal.   Nose: Nose normal. Right sinus exhibits no maxillary sinus tenderness and no frontal sinus tenderness. Left sinus exhibits no maxillary sinus tenderness and no frontal sinus tenderness.   Mouth/Throat: Uvula is midline and oropharynx is clear and moist. No oropharyngeal exudate or posterior oropharyngeal erythema.   Eyes: Conjunctivae are normal.   Neck: Neck supple.   Pulmonary/Chest: Effort normal and breath sounds normal.   Lymphadenopathy:     He has no cervical adenopathy.   Neurological: He is alert and oriented to person, place, and time.   Skin: Skin is warm and dry.   Psychiatric: He has a normal mood and affect.         Diagnostic Test Results:  Results for orders placed or performed in visit on 09/07/18   Valproic acid   Result Value Ref Range    Valproic Acid Level 84 50 - 100 mg/L   Hepatic panel (Albumin, ALT, AST, Bili, Alk Phos, TP)   Result Value Ref Range    Bilirubin Direct 0.2 0.0 - 0.2 mg/dL    Bilirubin Total 0.5 0.2 - 1.3 mg/dL    Albumin 3.9 3.4 - 5.0 g/dL    Protein Total 7.5 6.8 - 8.8 g/dL    Alkaline Phosphatase 68 40 - 150 U/L     (H) 0 - 70 U/L    AST 90 (H) 0 - 45 U/L       ASSESSMENT:   Slow transit constipation  Discussed ongoing management.  CT scheduled.  Follow up one month.  - polyethylene glycol (MIRALAX) powder; Take 17 g (1 capful) by mouth daily  - CT Abdomen Pelvis w Contrast; Future      Berny Velasquez MD  [facility] - [place]

## 2018-11-06 ENCOUNTER — OFFICE VISIT (OUTPATIENT)
Dept: FAMILY MEDICINE | Facility: OTHER | Age: 38
End: 2018-11-06
Attending: FAMILY MEDICINE
Payer: MEDICARE

## 2018-11-06 VITALS
RESPIRATION RATE: 18 BRPM | HEART RATE: 105 BPM | SYSTOLIC BLOOD PRESSURE: 138 MMHG | DIASTOLIC BLOOD PRESSURE: 78 MMHG | WEIGHT: 251 LBS | OXYGEN SATURATION: 96 % | TEMPERATURE: 97.9 F | BODY MASS INDEX: 34.52 KG/M2

## 2018-11-06 DIAGNOSIS — K59.01 SLOW TRANSIT CONSTIPATION: Primary | ICD-10-CM

## 2018-11-06 PROCEDURE — 99214 OFFICE O/P EST MOD 30 MIN: CPT | Performed by: FAMILY MEDICINE

## 2018-11-06 PROCEDURE — G0463 HOSPITAL OUTPT CLINIC VISIT: HCPCS

## 2018-11-06 RX ORDER — POLYETHYLENE GLYCOL 3350 17 G/17G
1 POWDER, FOR SOLUTION ORAL DAILY
Qty: 510 G | Refills: 1 | Status: SHIPPED | OUTPATIENT
Start: 2018-11-06 | End: 2019-01-04

## 2018-11-06 ASSESSMENT — PAIN SCALES - GENERAL: PAINLEVEL: MILD PAIN (2)

## 2018-11-06 NOTE — NURSING NOTE
"Chief Complaint   Patient presents with     Bloated     Respiratory Problems       Initial /78 (BP Location: Right arm, Patient Position: Sitting, Cuff Size: Adult Large)  Pulse 105  Temp 97.9  F (36.6  C) (Tympanic)  Resp 18  Wt 251 lb (113.9 kg)  SpO2 96%  BMI 34.52 kg/m2 Estimated body mass index is 34.52 kg/(m^2) as calculated from the following:    Height as of 5/1/18: 5' 11.5\" (1.816 m).    Weight as of this encounter: 251 lb (113.9 kg).  Medication Reconciliation: complete    Mairsol Mcclelland LPN    "

## 2018-11-06 NOTE — MR AVS SNAPSHOT
After Visit Summary   11/6/2018    Casey Pedro    MRN: [id number]           Patient Information     Date Of Birth          1980        Visit Information        Provider Department      11/6/2018 4:00 PM Berny Velasquez MD [facility]        Today's Diagnoses     Slow transit constipation    -  1      Care Instructions    Radiology will call to schedule CT abdomen.            Follow-ups after your visit        Follow-up notes from your care team     Return in about 1 month (around 12/6/2018) for follow up visit.      Your next 10 appointments already scheduled     Nov 14, 2018  8:30 AM CST   (Arrive by 7:30 AM)   CT ABDOMEN PELVIS W/O & W CONTRAST with HICT1   [facility] SCAN ([facility] )    [address] [place] [id number] [phone]           How do I prepare for my exam? (Food and drink instructions) To prepare: Do not eat or drink for 2 hours before your exam. If you need to take medicine, you may take it with small sips of water. (We may ask you to take liquid medicine as well.)  How do I prepare for my exam? (Other instructions) Please arrive 30 minutes early for your CT.  Once in the department you might be asked to drink water 15-20 minutes prior to your exam.  If indicated you may be asked to drink an oral contrast in advance of your CT.  If this is the case, the imaging team will let you know or be in contact with you prior to your appointment  Patients over 70 or patients with diabetes or kidney problems: If you haven t had a blood test (creatinine test) within the last 30 days, the Cardiologist/Radiologist may require you to get this test prior to your exam.  If you have diabetes:  Continue to take your metformin medication on the day of your exam  What should I wear: Please wear loose clothing, such as a sweat suit or jogging clothes. Avoid snaps, zippers and other metal. We may ask you to undress and put on a hospital gown.  How long  does the exam take: Most scans take less than 20 minutes.  What should I bring: Please bring any scans or X-rays taken at other hospitals, if similar tests were done. Also bring a list of your medicines, including vitamins, minerals and over-the-counter drugs. It is safest to leave personal items at home.  Do I need a : No  is needed.  What do I need to tell my doctor? Be sure to tell your doctor: * If you have any allergies. * If there s any chance you are pregnant. * If you are breastfeeding.  What should I do after the exam: No restrictions, You may resume normal activities.  What is this test: A CT (computed tomography) scan is a series of pictures that allows us to look inside your body. The scanner creates images of the body in cross sections, much like slices of bread. This helps us see any problems more clearly. You may receive contrast (X-ray dye) before or during your scan. You will be asked to drink the contrast.  Who should I call with questions: If you have any questions, please call the Imaging Department where you will have your exam. Directions, parking instructions, and other information is available on our website, [email or web address].              Who to contact     If you have questions or need follow up information about today's clinic visit or your schedule please contact [facility] directly at [phone].  Normal or non-critical lab and imaging results will be communicated to you by MyChart, letter or phone within 4 business days after the clinic has received the results. If you do not hear from us within 7 days, please contact the clinic through MyChart or phone. If you have a critical or abnormal lab result, we will notify you by phone as soon as possible.  Submit refill requests through [employer] or call your pharmacy and they will forward the refill request to us. Please allow 3 business days for your refill to be completed.          Additional  Information About Your Visit        Care EveryWhere ID     This is your Care EveryWhere ID. This could be used by other organizations to access your [facility] medical records  [id number]        Your Vitals Were     Pulse Temperature Respirations Pulse Oximetry BMI (Body Mass Index)       105 97.9  F (36.6  C) (Tympanic) 18 96% 34.52 kg/m2        Blood Pressure from Last 3 Encounters:   11/06/18 138/78   09/19/18 104/72   07/30/18 110/72    Weight from Last 3 Encounters:   11/06/18 251 lb (113.9 kg)   09/19/18 251 lb (113.9 kg)   07/30/18 152 lb (68.9 kg)                 Today's Medication Changes          These changes are accurate as of 11/6/18 11:59 PM.  If you have any questions, ask your nurse or doctor.               Start taking these medicines.        Dose/Directions    polyethylene glycol powder   Commonly known as:  MIRALAX   Used for:  Slow transit constipation        Dose:  1 capful   Take 17 g (1 capful) by mouth daily   Quantity:  510 g   Refills:  1         These medicines have changed or have updated prescriptions.        Dose/Directions    * VITAMIN D (CHOLECALCIFEROL) PO   This may have changed:  Another medication with the same name was removed. Continue taking this medication, and follow the directions you see here.        Dose:  83402 Units   Take 50,000 Units by mouth every 7 days   Refills:  0       * vitamin D 2000 units tablet   This may have changed:  Another medication with the same name was removed. Continue taking this medication, and follow the directions you see here.   Used for:  Vitamin D deficiency        Dose:  2000 Units   Take 2,000 Units by mouth daily   Quantity:  100 tablet   Refills:  3       * Notice:  This list has 2 medication(s) that are the same as other medications prescribed for you. Read the directions carefully, and ask your doctor or other care provider to review them with you.      Stop taking these medicines if you haven't already. Please contact your care team if  you have questions.     azithromycin 250 MG tablet   Commonly known as:  ZITHROMAX                Where to get your medicines      These medications were sent to [facility] - [place], [place] - [address]  [address] [facility] [place] [address]     Phone:  [phone]     polyethylene glycol powder                Primary Care Provider Office Phone # Fax #    Berny Velasquez [phone]       [facility] [facility] [address]        Equal Access to Services     [facility] : Hadii aad ku hadasho Soomaali, waaxda luqadaha, qaybta kaalmada adeegyada, waxay idiin hayaan alyssa magallon . So [facility] [phone].    ATENCIÓN: Si habla español, tiene a saunders disposición servicios gratuitos de asistencia lingüística. [facility] [phone].    We comply with applicable federal civil rights laws and [place] laws. We do not discriminate on the basis of race, color, national origin, age, disability, sex, sexual orientation, or gender identity.            Thank you!     Thank you for choosing [facility]  for your care. Our goal is always to provide you with excellent care. Hearing back from our patients is one way we can continue to improve our services. Please take a few minutes to complete the written survey that you may receive in the mail after your visit with us. Thank you!             Your Updated Medication List - Protect others around you: Learn how to safely use, store and throw away your medicines at www.disposemymeds.org.          This list is accurate as of 11/6/18 11:59 PM.  Always use your most recent med list.                   Brand Name Dispense Instructions for use Diagnosis    albuterol 108 (90 Base) MCG/ACT inhaler    PROAIR HFA/PROVENTIL HFA/VENTOLIN HFA     Inhale 2 puffs into the lungs every 6 hours        ALL DAY ALLERGY 10 MG tablet   Generic drug:  cetirizine     90 tablet    TAKE ONE (1) TABLET BY MOUTH DAILY    Chronic seasonal  allergic rhinitis, unspecified trigger       divalproex sodium delayed-release 500 MG DR tablet    DEPAKOTE    60 tablet    Take 2 tablets (1,000 mg) by mouth 2 times daily    Autism       fish oil-omega-3 fatty acids 1000 MG capsule     100 capsule    TAKE TWO (2) CAPSULES BY MOUTH DAILY    Health care maintenance       guanFACINE 1 MG tablet    TENEX    60 tablet    Take 1 tablet by mouth twice a day Take one tablet by mouth every day at 8 am and one tablet by mouth every day at 5 pm    Autism       hydrOXYzine 10 MG tablet    ATARAX     take by mouth 1 TAB EVERY 2 HOURS AS NEEDED FOR ANXIETY/ AGITATION - MAX 6 TABS PER DAY        LORazepam 1 MG tablet    ATIVAN    60 tablet    Take 1 tablet (1 mg) by mouth 2 times daily    Autism       omeprazole 40 MG capsule    priLOSEC    60 capsule    Take 1 capsule (40 mg) by mouth two times daily    Gastroesophageal reflux disease without esophagitis, Disruptive behavior disorder       polyethylene glycol powder    MIRALAX    510 g    Take 17 g (1 capful) by mouth daily    Slow transit constipation       SENEXON-S 8.6-50 MG per tablet   Generic drug:  senna-docusate     60 tablet    Take 1 tablet by mouth 2 times daily    Slow transit constipation       * VITAMIN D (CHOLECALCIFEROL) PO      Take 50,000 Units by mouth every 7 days        * vitamin D 2000 units tablet     100 tablet    Take 2,000 Units by mouth daily    Vitamin D deficiency       * ZYPREXA 20 MG tablet   Generic drug:  OLANZapine      Take 20 mg by mouth daily Take with the 10mg.        * OLANZAPINE PO      Take 5 mg by mouth 5mg tab dissolvable PRN        * OLANZAPINE PO      Take 5 mg by mouth daily 11:00am        * OLANZapine 10 MG tablet    zyPREXA    30 tablet    Take 1 tablet by mouth once a day Take one tablet by mouth every day at 7 am    Disruptive behavior disorder       * Notice:  This list has 6 medication(s) that are the same as other medications prescribed for you. Read the directions carefully,  and ask your doctor or other care provider to review them with you.

## 2018-11-08 ENCOUNTER — TRANSFERRED RECORDS (OUTPATIENT)
Dept: HEALTH INFORMATION MANAGEMENT | Facility: CLINIC | Age: 38
End: 2018-11-08

## 2018-11-14 ENCOUNTER — HOSPITAL ENCOUNTER (OUTPATIENT)
Dept: CT IMAGING | Facility: HOSPITAL | Age: 38
Discharge: HOME | End: 2018-11-14
Attending: FAMILY MEDICINE | Admitting: FAMILY MEDICINE
Payer: MEDICARE

## 2018-11-14 DIAGNOSIS — K59.01 SLOW TRANSIT CONSTIPATION: ICD-10-CM

## 2018-11-14 PROCEDURE — 74177 CT ABD & PELVIS W/CONTRAST: CPT | Mod: TC

## 2018-11-14 PROCEDURE — 25500064 ZZH RX 255 OP 636: Performed by: RADIOLOGY

## 2018-11-14 RX ORDER — IOPAMIDOL 612 MG/ML
100 INJECTION, SOLUTION INTRAVASCULAR ONCE
Status: COMPLETED | OUTPATIENT
Start: 2018-11-14 | End: 2018-11-14

## 2018-11-14 RX ADMIN — DIATRIZOATE MEGLUMINE AND DIATRIZOATE SODIUM 30 ML: 660; 100 SOLUTION ORAL; RECTAL at 08:51

## 2018-11-14 RX ADMIN — IOPAMIDOL 100 ML: 612 INJECTION, SOLUTION INTRAVENOUS at 08:50

## 2018-12-24 ENCOUNTER — HOSPITAL ENCOUNTER (EMERGENCY)
Facility: HOSPITAL | Age: 38
Discharge: HOME | End: 2018-12-24
Attending: NURSE PRACTITIONER | Admitting: NURSE PRACTITIONER
Payer: MEDICARE

## 2018-12-24 VITALS
TEMPERATURE: 98.1 F | DIASTOLIC BLOOD PRESSURE: 96 MMHG | OXYGEN SATURATION: 96 % | RESPIRATION RATE: 17 BRPM | HEART RATE: 86 BPM | SYSTOLIC BLOOD PRESSURE: 141 MMHG

## 2018-12-24 DIAGNOSIS — R06.2 WHEEZING: ICD-10-CM

## 2018-12-24 DIAGNOSIS — R05.9 COUGH: ICD-10-CM

## 2018-12-24 DIAGNOSIS — J01.40 ACUTE PANSINUSITIS, UNSPECIFIED: ICD-10-CM

## 2018-12-24 DIAGNOSIS — J32.4 PANSINUSITIS, UNSPECIFIED CHRONICITY: ICD-10-CM

## 2018-12-24 PROCEDURE — 99213 OFFICE O/P EST LOW 20 MIN: CPT | Mod: Z6 | Performed by: NURSE PRACTITIONER

## 2018-12-24 PROCEDURE — G0463 HOSPITAL OUTPT CLINIC VISIT: HCPCS

## 2018-12-24 RX ORDER — IPRATROPIUM BROMIDE AND ALBUTEROL SULFATE 2.5; .5 MG/3ML; MG/3ML
1 SOLUTION RESPIRATORY (INHALATION) EVERY 4 HOURS PRN
Qty: 1 BOX | Refills: 0 | Status: SHIPPED | OUTPATIENT
Start: 2018-12-24 | End: 2022-05-16

## 2018-12-24 RX ORDER — CEFDINIR 300 MG/1
300 CAPSULE ORAL 2 TIMES DAILY
Qty: 14 CAPSULE | Refills: 0 | Status: SHIPPED | OUTPATIENT
Start: 2018-12-24 | End: 2019-04-15

## 2018-12-24 ASSESSMENT — ENCOUNTER SYMPTOMS
NAUSEA: 0
FATIGUE: 0
ABDOMINAL PAIN: 0
COUGH: 1
SORE THROAT: 1
DYSURIA: 0
ARTHRALGIAS: 0
VOMITING: 0
FEVER: 0
SHORTNESS OF BREATH: 0
WHEEZING: 1
HEADACHES: 0
MYALGIAS: 0
APPETITE CHANGE: 0

## 2018-12-24 NOTE — ED PROVIDER NOTES
History     Chief Complaint   Patient presents with     Nasal Congestion     3 day hx of congestion     HPI  Casey Pedro is a 38 year old male who presents today with staff from [facility] with a CC of sinus congestion, cough, chest wall pain, wheezing, sore throat x 3 days.  No fevers.  Appetite has been unchanged.  He has been getting OTC Robitussin DM without much improvement.      Problem List:    Patient Active Problem List    Diagnosis Date Noted     Mild intermittent asthma without complication 02/20/2018     Priority: Medium     Slow transit constipation 02/20/2018     Priority: Medium     Seasonal allergic rhinitis 09/27/2016     Priority: Medium     Disruptive behavior disorder 08/11/2016     Priority: Medium     Annual NHS Examination 01/06/2015     Priority: Medium     BPH (begnign prostatic hyperplasia) 09/16/2011     Priority: Medium     Mental retardation 09/16/2011     Priority: Medium     Autism 09/16/2011     Priority: Medium        Past Medical History:    Past Medical History:   Diagnosis Date     Autism 09/16/2011     BPH 09/16/2011     Mental retardation 09/16/2011       Past Surgical History:    No past surgical history on file.    Family History:    Family History   Problem Relation Age of Onset     Alcohol/Drug Father         alcoholism       Social History:  Marital Status:  Single [1]  Social History     Tobacco Use     Smoking status: Never Smoker     Smokeless tobacco: Never Used     Tobacco comment: no passive exposure   Substance Use Topics     Alcohol use: No     Drug use: No        Medications:      albuterol (PROAIR HFA/PROVENTIL HFA/VENTOLIN HFA) 108 (90 Base) MCG/ACT Inhaler   ALL DAY ALLERGY 10 MG tablet   cefdinir (OMNICEF) 300 MG capsule   Cholecalciferol (VITAMIN D) 2000 units tablet   divalproex sodium delayed-release (DEPAKOTE) 500 MG DR tablet   fish oil-omega-3 fatty acids 1000 MG capsule   guanFACINE (TENEX) 1 MG tablet   hydrOXYzine (ATARAX) 10 MG tablet    ipratropium - albuterol 0.5 mg/2.5 mg/3 mL (DUONEB) 0.5-2.5 (3) MG/3ML neb solution   LORazepam (ATIVAN) 1 MG tablet   OLANZapine (ZYPREXA) 10 MG tablet   OLANZapine (ZYPREXA) 20 MG tablet   OLANZAPINE PO   OLANZAPINE PO   polyethylene glycol (MIRALAX) powder   SENEXON-S 8.6-50 MG per tablet   VITAMIN D, CHOLECALCIFEROL, PO   omeprazole (PRILOSEC) 40 MG capsule         Review of Systems   Constitutional: Negative for appetite change, fatigue and fever.   HENT: Positive for congestion and sore throat. Negative for ear pain.    Respiratory: Positive for cough and wheezing. Negative for shortness of breath.    Gastrointestinal: Negative for abdominal pain, nausea and vomiting.   Genitourinary: Negative for dysuria.   Musculoskeletal: Negative for arthralgias and myalgias.   Skin: Negative for rash.   Neurological: Negative for headaches.       Physical Exam   BP: 141/96  Pulse: 86  Temp: 98.1  F (36.7  C)  Resp: 17  SpO2: 96 %      Physical Exam   Constitutional: He appears well-developed. He is cooperative. He does not appear ill. No distress.   HENT:   Right Ear: Tympanic membrane, external ear and ear canal normal.   Left Ear: Tympanic membrane, external ear and ear canal normal.   Nose: Mucosal edema and rhinorrhea present.   Mouth/Throat: Uvula is midline. Posterior oropharyngeal edema and posterior oropharyngeal erythema present. No tonsillar abscesses. Tonsils are 3+ on the right. Tonsils are 3+ on the left. No tonsillar exudate.   Eyes: Conjunctivae are normal.   Neck: Normal range of motion. Neck supple.   Cardiovascular: Normal rate and regular rhythm.   Pulmonary/Chest: Effort normal and breath sounds normal. He has no wheezes.   Abdominal: There is no tenderness.   Lymphadenopathy:     He has no cervical adenopathy.   Neurological: He is alert.   Skin: Skin is warm and dry.   Nursing note and vitals reviewed.      ED Course        Procedures      Assessments & Plan (with Medical Decision Making)     I  have reviewed the nursing notes.    I have reviewed the findings, diagnosis, plan and need for follow up with the patient.  ASSESSMENT / PLAN:  (J32.4) Pansinusitis, unspecified chronicity  Comment: with pharyngitis  Plan:  Cefdinir as prescribed     Symptomatic treatments such as those listed below are recommended as indicated:  Take OTC motrin or tylenol as directed on packaging - as needed  Humidified air  May try safely elevating HOB or sleeping in recliner  May try OTC cold medicine as directed on bottle - check ingredients, many are multi symptom and may contain tylenol or motrin  Stay well hydrated, rest, wash hands often  Sinus saline nasal spray with suctioning or rinses such as a netti pot may help with sinus symptoms  Return to ED/UC if symptoms worsen or concerns develop  Patient verbally educated and given written discharge instructions    (R05) Cough    (R06.2) Wheezing  Comment: reported by staff  Plan:  Duoneb as prescribed         Medication List      Started    cefdinir 300 MG capsule  Commonly known as:  OMNICEF  300 mg, Oral, 2 TIMES DAILY     ipratropium - albuterol 0.5 mg/2.5 mg/3 mL 0.5-2.5 (3) MG/3ML neb solution  Commonly known as:  DUONEB  1 vial, Nebulization, EVERY 4 HOURS PRN        ASK your doctor about these medications    nebulizer/adult mask Kit kit  1 kit, Inhalation, ONCE  Ask about: Should I take this medication?            Final diagnoses:   Pansinusitis, unspecified chronicity   Cough   Wheezing       12/24/2018   [facility]     Marisol Garcia NP  12/25/18 0035

## 2018-12-24 NOTE — ED TRIAGE NOTES
3 day hx of congestion. No known fever. Resident of [place] with [facility]

## 2018-12-24 NOTE — ED AVS SNAPSHOT
[facility]  [address] [place] [address]  Phone:  [phone]                                    Casey Pedro   MRN: [id number]    Department:  [facility]   Date of Visit:  12/24/2018           After Visit Summary Signature Page    I have received my discharge instructions, and my questions have been answered. I have discussed any challenges I see with this plan with the nurse or doctor.    ..........................................................................................................................................  Patient/Patient Representative Signature      ..........................................................................................................................................  Patient Representative Print Name and Relationship to Patient    ..................................................               ................................................  Date                                   Time    ..........................................................................................................................................  Reviewed by Signature/Title    ...................................................              ..............................................  Date                                               Time          22EPIC Rev 08/18

## 2019-01-02 DIAGNOSIS — K59.01 SLOW TRANSIT CONSTIPATION: ICD-10-CM

## 2019-01-02 NOTE — TELEPHONE ENCOUNTER
Miralax      Last Written Prescription Date:  11/6/18  Last Fill Quantity: 510g,   # refills: 1  Last Office Visit: 11/6/18  Future Office visit:       Routing refill request to provider for review/approval because:

## 2019-01-04 RX ORDER — POLYETHYLENE GLYCOL 3350 17 G/17G
1 POWDER, FOR SOLUTION ORAL DAILY
Qty: 510 G | Refills: 1 | Status: SHIPPED | OUTPATIENT
Start: 2019-01-04 | End: 2019-03-18

## 2019-02-01 ENCOUNTER — TELEPHONE (OUTPATIENT)
Dept: FAMILY MEDICINE | Facility: OTHER | Age: 39
End: 2019-02-01

## 2019-02-01 NOTE — TELEPHONE ENCOUNTER
9:47 AM    Reason for Call: Phone Call    Description: Carly from [facility] called and stated that she is wondering if she can drop of consent forms to be filled out for [employer] Olympics or if patient will need to come in to have an appointment to have paperwork completed.     Was an appointment offered for this call? No  If yes : Appointment type              Date    Preferred method for responding to this message: Telephone Call  What is your phone number ?[phone]    If we cannot reach you directly, may we leave a detailed response at the number you provided? Yes    Can this message wait until your PCP/provider returns, if available today? Not applicable    Velia Henderson MA

## 2019-03-18 DIAGNOSIS — K59.01 SLOW TRANSIT CONSTIPATION: ICD-10-CM

## 2019-03-18 NOTE — TELEPHONE ENCOUNTER
Miralax       Last Written Prescription Date:  1/4/2019  Last Fill Quantity: 510g,   # refills: 1  Last Office Visit: 11/6/2018  Future Office visit:

## 2019-03-20 RX ORDER — POLYETHYLENE GLYCOL 3350 17 G/17G
1 POWDER, FOR SOLUTION ORAL DAILY
Qty: 510 G | Refills: 1 | Status: SHIPPED | OUTPATIENT
Start: 2019-03-20 | End: 2019-04-08

## 2019-04-10 ENCOUNTER — TELEPHONE (OUTPATIENT)
Dept: FAMILY MEDICINE | Facility: OTHER | Age: 39
End: 2019-04-10

## 2019-04-10 DIAGNOSIS — J30.1 SEASONAL ALLERGIC RHINITIS DUE TO POLLEN: Primary | ICD-10-CM

## 2019-04-10 NOTE — TELEPHONE ENCOUNTER
Received a fax from [facility] stating that Casey is beginning to show symptoms of seasonal allergies, as is typical for him around this time of year. He is congestion, has a runny nose, watery eyes and coughing. They are wondering if you could prescribe Flonase in addition to the Zyrtec he already takes daily. Please Advise.

## 2019-04-11 RX ORDER — FLUTICASONE PROPIONATE 50 MCG
1 SPRAY, SUSPENSION (ML) NASAL DAILY
Qty: 16.8 G | Refills: 3 | Status: SHIPPED | OUTPATIENT
Start: 2019-04-11 | End: 2019-07-08

## 2019-04-11 NOTE — TELEPHONE ENCOUNTER
RX faxed to [facility] letting them know prescription for Flonase was sent to [facility].

## 2019-04-15 ENCOUNTER — HOSPITAL ENCOUNTER (EMERGENCY)
Facility: HOSPITAL | Age: 39
Discharge: HOME | End: 2019-04-15
Attending: EMERGENCY MEDICINE | Admitting: EMERGENCY MEDICINE
Payer: MEDICARE

## 2019-04-15 VITALS
HEART RATE: 129 BPM | DIASTOLIC BLOOD PRESSURE: 71 MMHG | OXYGEN SATURATION: 97 % | RESPIRATION RATE: 16 BRPM | SYSTOLIC BLOOD PRESSURE: 103 MMHG | TEMPERATURE: 97.4 F

## 2019-04-15 DIAGNOSIS — F84.0 AUTISM: ICD-10-CM

## 2019-04-15 DIAGNOSIS — F91.9 DISRUPTIVE BEHAVIOR DISORDER: ICD-10-CM

## 2019-04-15 DIAGNOSIS — E11.9 NEW ONSET TYPE 2 DIABETES MELLITUS (H): Primary | ICD-10-CM

## 2019-04-15 DIAGNOSIS — F70 MILD MENTAL RETARDATION (I.Q. 50-70): ICD-10-CM

## 2019-04-15 LAB
ALBUMIN SERPL-MCNC: 4.2 G/DL (ref 3.4–5)
ALBUMIN UR-MCNC: 100 MG/DL
ALP SERPL-CCNC: 72 U/L (ref 40–150)
ALT SERPL W P-5'-P-CCNC: 112 U/L (ref 0–70)
AMPHETAMINES UR QL: NOT DETECTED NG/ML
ANION GAP SERPL CALCULATED.3IONS-SCNC: 11 MMOL/L (ref 3–14)
APPEARANCE UR: CLEAR
AST SERPL W P-5'-P-CCNC: 69 U/L (ref 0–45)
BACTERIA #/AREA URNS HPF: ABNORMAL /HPF
BARBITURATES UR QL SCN: NOT DETECTED NG/ML
BASOPHILS # BLD AUTO: 0 10E9/L (ref 0–0.2)
BASOPHILS NFR BLD AUTO: 0.7 %
BENZODIAZ UR QL SCN: ABNORMAL NG/ML
BILIRUB SERPL-MCNC: 0.5 MG/DL (ref 0.2–1.3)
BILIRUB UR QL STRIP: NEGATIVE
BUN SERPL-MCNC: 14 MG/DL (ref 7–30)
BUPRENORPHINE UR QL: NOT DETECTED NG/ML
CALCIUM SERPL-MCNC: 9 MG/DL (ref 8.5–10.1)
CANNABINOIDS UR QL: NOT DETECTED NG/ML
CHLORIDE SERPL-SCNC: 102 MMOL/L (ref 94–109)
CO2 SERPL-SCNC: 23 MMOL/L (ref 20–32)
COCAINE UR QL SCN: NOT DETECTED NG/ML
COLOR UR AUTO: ABNORMAL
CREAT SERPL-MCNC: 0.63 MG/DL (ref 0.66–1.25)
D-METHAMPHET UR QL: NOT DETECTED NG/ML
DIFFERENTIAL METHOD BLD: ABNORMAL
EOSINOPHIL # BLD AUTO: 0.1 10E9/L (ref 0–0.7)
EOSINOPHIL NFR BLD AUTO: 1.4 %
ERYTHROCYTE [DISTWIDTH] IN BLOOD BY AUTOMATED COUNT: 12.7 % (ref 10–15)
EST. AVERAGE GLUCOSE BLD GHB EST-MCNC: 192 MG/DL
ETHANOL SERPL-MCNC: <0.01 G/DL
GFR SERPL CREATININE-BSD FRML MDRD: >90 ML/MIN/{1.73_M2}
GLUCOSE SERPL-MCNC: 302 MG/DL (ref 70–99)
GLUCOSE UR STRIP-MCNC: >1000 MG/DL
HBA1C MFR BLD: 8.3 % (ref 0–5.6)
HCT VFR BLD AUTO: 43.3 % (ref 40–53)
HGB BLD-MCNC: 15.2 G/DL (ref 13.3–17.7)
HGB UR QL STRIP: NEGATIVE
IMM GRANULOCYTES # BLD: 0 10E9/L (ref 0–0.4)
IMM GRANULOCYTES NFR BLD: 0.2 %
KETONES UR STRIP-MCNC: 10 MG/DL
LEUKOCYTE ESTERASE UR QL STRIP: NEGATIVE
LYMPHOCYTES # BLD AUTO: 1.7 10E9/L (ref 0.8–5.3)
LYMPHOCYTES NFR BLD AUTO: 40 %
MCH RBC QN AUTO: 29.7 PG (ref 26.5–33)
MCHC RBC AUTO-ENTMCNC: 35.1 G/DL (ref 31.5–36.5)
MCV RBC AUTO: 85 FL (ref 78–100)
METHADONE UR QL SCN: NOT DETECTED NG/ML
MONOCYTES # BLD AUTO: 0.4 10E9/L (ref 0–1.3)
MONOCYTES NFR BLD AUTO: 8.3 %
MUCOUS THREADS #/AREA URNS LPF: PRESENT /LPF
NEUTROPHILS # BLD AUTO: 2.2 10E9/L (ref 1.6–8.3)
NEUTROPHILS NFR BLD AUTO: 49.4 %
NITRATE UR QL: NEGATIVE
NRBC # BLD AUTO: 0 10*3/UL
NRBC BLD AUTO-RTO: 0 /100
OPIATES UR QL SCN: NOT DETECTED NG/ML
OXYCODONE UR QL SCN: NOT DETECTED NG/ML
PCP UR QL SCN: NOT DETECTED NG/ML
PH UR STRIP: 6 PH (ref 4.7–8)
PLATELET # BLD AUTO: 114 10E9/L (ref 150–450)
POTASSIUM SERPL-SCNC: 3.7 MMOL/L (ref 3.4–5.3)
PROPOXYPH UR QL: NOT DETECTED NG/ML
PROT SERPL-MCNC: 8 G/DL (ref 6.8–8.8)
RBC # BLD AUTO: 5.11 10E12/L (ref 4.4–5.9)
RBC #/AREA URNS AUTO: 2 /HPF (ref 0–2)
SODIUM SERPL-SCNC: 136 MMOL/L (ref 133–144)
SOURCE: ABNORMAL
SP GR UR STRIP: 1.03 (ref 1–1.03)
TRICYCLICS UR QL SCN: NOT DETECTED NG/ML
TSH SERPL DL<=0.005 MIU/L-ACNC: 2.65 MU/L (ref 0.4–4)
UROBILINOGEN UR STRIP-MCNC: NORMAL MG/DL (ref 0–2)
WBC # BLD AUTO: 4.4 10E9/L (ref 4–11)
WBC #/AREA URNS AUTO: 2 /HPF (ref 0–5)

## 2019-04-15 PROCEDURE — 96372 THER/PROPH/DIAG INJ SC/IM: CPT

## 2019-04-15 PROCEDURE — 80306 DRUG TEST PRSMV INSTRMNT: CPT | Performed by: EMERGENCY MEDICINE

## 2019-04-15 PROCEDURE — 25000132 ZZH RX MED GY IP 250 OP 250 PS 637: Performed by: EMERGENCY MEDICINE

## 2019-04-15 PROCEDURE — 99283 EMERGENCY DEPT VISIT LOW MDM: CPT | Mod: Z6 | Performed by: EMERGENCY MEDICINE

## 2019-04-15 PROCEDURE — 25000128 H RX IP 250 OP 636: Performed by: EMERGENCY MEDICINE

## 2019-04-15 PROCEDURE — A9270 NON-COVERED ITEM OR SERVICE: HCPCS | Performed by: EMERGENCY MEDICINE

## 2019-04-15 PROCEDURE — 85025 COMPLETE CBC W/AUTO DIFF WBC: CPT | Performed by: EMERGENCY MEDICINE

## 2019-04-15 PROCEDURE — 80053 COMPREHEN METABOLIC PANEL: CPT | Performed by: EMERGENCY MEDICINE

## 2019-04-15 PROCEDURE — 80320 DRUG SCREEN QUANTALCOHOLS: CPT | Performed by: EMERGENCY MEDICINE

## 2019-04-15 PROCEDURE — 36415 COLL VENOUS BLD VENIPUNCTURE: CPT | Performed by: EMERGENCY MEDICINE

## 2019-04-15 PROCEDURE — 84443 ASSAY THYROID STIM HORMONE: CPT | Performed by: EMERGENCY MEDICINE

## 2019-04-15 PROCEDURE — 81001 URINALYSIS AUTO W/SCOPE: CPT | Performed by: EMERGENCY MEDICINE

## 2019-04-15 PROCEDURE — 83036 HEMOGLOBIN GLYCOSYLATED A1C: CPT | Performed by: EMERGENCY MEDICINE

## 2019-04-15 PROCEDURE — 99284 EMERGENCY DEPT VISIT MOD MDM: CPT | Mod: 25

## 2019-04-15 PROCEDURE — 40000788 ZZHCL STATISTIC ESTIMATED AVERAGE GLUCOSE: Performed by: EMERGENCY MEDICINE

## 2019-04-15 RX ORDER — ACETAMINOPHEN 325 MG/1
975 TABLET ORAL ONCE
Status: COMPLETED | OUTPATIENT
Start: 2019-04-15 | End: 2019-04-15

## 2019-04-15 RX ORDER — ZIPRASIDONE MESYLATE 20 MG/ML
20 INJECTION, POWDER, LYOPHILIZED, FOR SOLUTION INTRAMUSCULAR EVERY 4 HOURS PRN
Status: DISCONTINUED | OUTPATIENT
Start: 2019-04-15 | End: 2019-04-15 | Stop reason: HOSPADM

## 2019-04-15 RX ORDER — RISPERIDONE 0.25 MG/1
0.25 TABLET ORAL 2 TIMES DAILY
Refills: 4 | COMMUNITY
Start: 2019-04-01

## 2019-04-15 RX ADMIN — ACETAMINOPHEN 975 MG: 325 TABLET, FILM COATED ORAL at 17:20

## 2019-04-15 RX ADMIN — ZIPRASIDONE MESYLATE 20 MG: 20 INJECTION, POWDER, LYOPHILIZED, FOR SOLUTION INTRAMUSCULAR at 16:02

## 2019-04-15 ASSESSMENT — ENCOUNTER SYMPTOMS
FEVER: 0
ABDOMINAL PAIN: 0
SHORTNESS OF BREATH: 0
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
## 2019-04-15 NOTE — ED NOTES
Pt awake, talking with staff calmly. Staff stated pt has had a cough and runny nose for the last few days, stated pt talked about chest discomfort with cough.

## 2019-04-15 NOTE — ED NOTES
Time to complete medication reconciliation was 10 minutes.    Patient has med list via [facility].

## 2019-04-15 NOTE — ED NOTES
Staff with pt stated pt was c/o a headache and requesting tylenol for pt. Staff stated pt was c/o headache before outburst this afternoon.

## 2019-04-15 NOTE — ED AVS SNAPSHOT
[facility]  [address] [address]  Phone:  [phone]                                    Casey Pedro   MRN: [id number]    Department:  [facility]   Date of Visit:  4/15/2019           After Visit Summary Signature Page    I have received my discharge instructions, and my questions have been answered. I have discussed any challenges I see with this plan with the nurse or doctor.    ..........................................................................................................................................  Patient/Patient Representative Signature      ..........................................................................................................................................  Patient Representative Print Name and Relationship to Patient    ..................................................               ................................................  Date                                   Time    ..........................................................................................................................................  Reviewed by Signature/Title    ...................................................              ..............................................  Date                                               Time          22EPIC Rev 08/18
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
## 2019-04-16 ENCOUNTER — TELEPHONE (OUTPATIENT)
Dept: FAMILY MEDICINE | Facility: OTHER | Age: 39
End: 2019-04-16

## 2019-04-16 DIAGNOSIS — E11.9 TYPE 2 DIABETES MELLITUS WITHOUT COMPLICATION, WITHOUT LONG-TERM CURRENT USE OF INSULIN (H): Primary | ICD-10-CM

## 2019-04-16 RX ORDER — METFORMIN HCL 500 MG
500 TABLET, EXTENDED RELEASE 24 HR ORAL
Qty: 60 TABLET | Refills: 3 | Status: SHIPPED | OUTPATIENT
Start: 2019-04-16 | End: 2019-10-19

## 2019-04-16 NOTE — TELEPHONE ENCOUNTER
Gerda from [facility] would like a call back at [phone] to discuss patients ER vist on 04/15/2019 he was having very intense behavior he was diagnosed with Type II Diabetes and had a Blod Glucose of 300 Also would like to talk about Metformine that was prescribed and how they should start it.    Thank you

## 2019-04-19 DIAGNOSIS — K59.01 SLOW TRANSIT CONSTIPATION: ICD-10-CM

## 2019-04-22 NOTE — TELEPHONE ENCOUNTER
senexon      Last Written Prescription Date:  10/5/18  Last Fill Quantity: 60,   # refills: 5  Last Office Visit: 11/6/18  Future Office visit:    Next 5 appointments (look out 90 days)    Apr 24, 2019 10:40 AM CDT  (Arrive by 10:25 AM)  SHORT with Berny Velasquez MD  [facility] ([facility] ) [address]  [place] [address]  [phone]

## 2019-04-23 RX ORDER — DOCUSATE SODIUM 50MG AND SENNOSIDES 8.6MG 8.6; 5 MG/1; MG/1
TABLET, FILM COATED ORAL
Qty: 60 TABLET | Refills: 5 | Status: SHIPPED | OUTPATIENT
Start: 2019-04-23 | End: 2019-09-12

## 2019-04-24 ENCOUNTER — OFFICE VISIT (OUTPATIENT)
Dept: FAMILY MEDICINE | Facility: OTHER | Age: 39
End: 2019-04-24
Attending: FAMILY MEDICINE
Payer: MEDICARE

## 2019-04-24 VITALS
HEART RATE: 87 BPM | DIASTOLIC BLOOD PRESSURE: 71 MMHG | OXYGEN SATURATION: 96 % | RESPIRATION RATE: 16 BRPM | TEMPERATURE: 97.6 F | WEIGHT: 247 LBS | BODY MASS INDEX: 33.97 KG/M2 | SYSTOLIC BLOOD PRESSURE: 110 MMHG

## 2019-04-24 DIAGNOSIS — E11.9 TYPE 2 DIABETES MELLITUS WITHOUT COMPLICATION, WITHOUT LONG-TERM CURRENT USE OF INSULIN (H): Primary | ICD-10-CM

## 2019-04-24 PROCEDURE — G0463 HOSPITAL OUTPT CLINIC VISIT: HCPCS | Performed by: FAMILY MEDICINE

## 2019-04-24 PROCEDURE — 99213 OFFICE O/P EST LOW 20 MIN: CPT | Performed by: FAMILY MEDICINE

## 2019-04-24 ASSESSMENT — ASTHMA QUESTIONNAIRES
QUESTION_1 LAST FOUR WEEKS HOW MUCH OF THE TIME DID YOUR ASTHMA KEEP YOU FROM GETTING AS MUCH DONE AT WORK, SCHOOL OR AT HOME: NONE OF THE TIME
QUESTION_2 LAST FOUR WEEKS HOW OFTEN HAVE YOU HAD SHORTNESS OF BREATH: ONCE OR TWICE A WEEK
QUESTION_4 LAST FOUR WEEKS HOW OFTEN HAVE YOU USED YOUR RESCUE INHALER OR NEBULIZER MEDICATION (SUCH AS ALBUTEROL): NOT AT ALL
ACT_TOTALSCORE: 23
QUESTION_3 LAST FOUR WEEKS HOW OFTEN DID YOUR ASTHMA SYMPTOMS (WHEEZING, COUGHING, SHORTNESS OF BREATH, CHEST TIGHTNESS OR PAIN) WAKE YOU UP AT NIGHT OR EARLIER THAN USUAL IN THE MORNING: NOT AT ALL
QUESTION_5 LAST FOUR WEEKS HOW WOULD YOU RATE YOUR ASTHMA CONTROL: WELL CONTROLLED

## 2019-04-24 ASSESSMENT — PAIN SCALES - GENERAL: PAINLEVEL: NO PAIN (0)

## 2019-04-24 NOTE — NURSING NOTE
"Chief Complaint   Patient presents with     ER F/U       Initial /71 (BP Location: Right arm, Patient Position: Sitting, Cuff Size: Adult Regular)   Pulse 87   Temp 97.6  F (36.4  C) (Tympanic)   Resp 16   Wt 112 kg (247 lb)   SpO2 96%   BMI 33.97 kg/m   Estimated body mass index is 33.97 kg/m  as calculated from the following:    Height as of 5/1/18: 1.816 m (5' 11.5\").    Weight as of this encounter: 112 kg (247 lb).  Medication Reconciliation: complete    Rica Dillon LPN  "

## 2019-04-24 NOTE — LETTER
My Asthma Action Plan  Name: Casey Pedro   YOB: 1980  Date: 4/24/2019   My doctor: Berny Velasquez MD   [facility]        My Control Medicine: None  My Rescue Medicine: Albuterol (Proair/Ventolin/Proventil) inhaler 2 puffs into lungs every 6 hours PRN   My Asthma Severity: intermittent  Avoid your asthma triggers: Patient is unaware of triggers               GREEN ZONE   Good Control    I feel good    No cough or wheeze    Can work, sleep and play without asthma symptoms       Take your asthma control medicine every day.     1. If exercise triggers your asthma, take your rescue medication    15 minutes before exercise or sports, and    During exercise if you have asthma symptoms  2. Spacer to use with inhaler: If you have a spacer, make sure to use it with your inhaler             YELLOW ZONE Getting Worse  I have ANY of these:    I do not feel good    Cough or wheeze    Chest feels tight    Wake up at night   1. Keep taking your Green Zone medications  2. Start taking your rescue medicine:    every 20 minutes for up to 1 hour. Then every 4 hours for 24-48 hours.  3. If you stay in the Yellow Zone for more than 12-24 hours, contact your doctor.  4. If you do not return to the Green Zone in 12-24 hours or you get worse, start taking your oral steroid medicine if prescribed by your provider.           RED ZONE Medical Alert - Get Help  I have ANY of these:    I feel awful    Medicine is not helping    Breathing getting harder    Trouble walking or talking    Nose opens wide to breathe       1. Take your rescue medicine NOW  2. If your provider has prescribed an oral steroid medicine, start taking it NOW  3. Call your doctor NOW  4. If you are still in the Red Zone after 20 minutes and you have not reached your doctor:    Take your rescue medicine again and    Call 911 or go to the emergency room right away    See your regular doctor within 2 weeks of an Emergency  Room or Urgent Care visit for follow-up treatment.          Annual Reminders:  Meet with Asthma Educator,  Flu Shot in the Fall, consider Pneumonia Vaccination for patients with asthma (aged 19 and older).    Pharmacy:    [facility] [address] [facility] [facility] - [place] - [address] [facility] [address]  [facility] - [place], [place] - [address]                      Asthma Triggers  How To Control Things That Make Your Asthma Worse    Triggers are things that make your asthma worse.  Look at the list below to help you find your triggers and what you can do about them.  You can help prevent asthma flare-ups by staying away from your triggers.      Trigger                                                          What you can do   Cigarette Smoke  Tobacco smoke can make asthma worse. Do not allow smoking in your home, car or around you.  Be sure no one smokes at a child s day care or school.  If you smoke, ask your health care provider for ways to help you quit.  Ask family members to quit too.  Ask your health care provider for a referral to Quit Plan to help you quit smoking, or call 7-977-034-PLAN.     Colds, Flu, Bronchitis  These are common triggers of asthma. Wash your hands often.  Don t touch your eyes, nose or mouth.  Get a flu shot every year.     Dust Mites  These are tiny bugs that live in cloth or carpet. They are too small to see. Wash sheets and blankets in hot water every week.   Encase pillows and mattress in dust mite proof covers.  Avoid having carpet if you can. If you have carpet, vacuum weekly.   Use a dust mask and HEPA vacuum.   Pollen and Outdoor Mold  Some people are allergic to trees, grass, or weed pollen, or molds. Try to keep your windows closed.  Limit time out doors when pollen count is high.   Ask you health care provider about taking medicine during allergy season.     Animal Dander  Some people are  allergic to skin flakes, urine or saliva from pets with fur or feathers. Keep pets with fur or feathers out of your home.    If you can t keep the pet outdoors, then keep the pet out of your bedroom.  Keep the bedroom door closed.  Keep pets off cloth furniture and away from stuffed toys.     Mice, Rats, and Cockroaches  Some people are allergic to the waste from these pests.   Cover food and garbage.  Clean up spills and food crumbs.  Store grease in the refrigerator.   Keep food out of the bedroom.   Indoor Mold  This can be a trigger if your home has high moisture. Fix leaking faucets, pipes, or other sources of water.   Clean moldy surfaces.  Dehumidify basement if it is damp and smelly.   Smoke, Strong Odors, and Sprays  These can reduce air quality. Stay away from strong odors and sprays, such as perfume, powder, hair spray, paints, smoke incense, paint, cleaning products, candles and new carpet.   Exercise or Sports  Some people with asthma have this trigger. Be active!  Ask your doctor about taking medicine before sports or exercise to prevent symptoms.    Warm up for 5-10 minutes before and after sports or exercise.     Other Triggers of Asthma  Cold air:  Cover your nose and mouth with a scarf.  Sometimes laughing or crying can be a trigger.  Some medicines and food can trigger asthma.

## 2019-04-24 NOTE — PROGRESS NOTES
SUBJECTIVE:   Casey Pedro is a 38 year old male who presents to clinic today for the following health issues:    ED/UC Followup:    Facility:  [facility] [place]  Date of visit: 04/15/19  Reason for visit: disruptive behavior  Current Status: still having behaviors had to have police come again 04/22/19          Diabetes Follow-up      Patient is checking blood sugars: not at all    Diabetic concerns: None     Symptoms of hypoglycemia (low blood sugar): none     Paresthesias (numbness or burning in feet) or sores: unable to verbalize this     Date of last diabetic eye exam: unknown    BP Readings from Last 2 Encounters:   04/24/19 110/71   04/15/19 103/71     Hemoglobin A1C (%)   Date Value   04/15/2019 8.3 (H)   05/08/2017 5.0     LDL Cholesterol Calculated (mg/dL)   Date Value   05/01/2018     Cannot estimate LDL when triglyceride exceeds 400 mg/dL   11/15/2016 62       Diabetes Management Resources    Amount of exercise or physical activity: 6-7 days/week for an average of 60 minutes    Problems taking medications regularly: No    Medication side effects: none    Diet: diabetic    Problem list and histories reviewed & adjusted, as indicated.  Additional history: as documented    Patient Active Problem List   Diagnosis     BPH (begnign prostatic hyperplasia)     Mental retardation     Autism     Annual [facility] Examination     Disruptive behavior disorder     Seasonal allergic rhinitis     Mild intermittent asthma without complication     Slow transit constipation     Type 2 diabetes mellitus without complication, without long-term current use of insulin (H)     No past surgical history on file.    Social History     Tobacco Use     Smoking status: Never Smoker     Smokeless tobacco: Never Used     Tobacco comment: no passive exposure   Substance Use Topics     Alcohol use: No     Family History   Problem Relation Age of Onset     Alcohol/Drug Father         alcoholism         Current Outpatient Medications   Medication Sig  Dispense Refill     albuterol (PROAIR HFA/PROVENTIL HFA/VENTOLIN HFA) 108 (90 Base) MCG/ACT Inhaler Inhale 2 puffs into the lungs every 6 hours       cetirizine (ZYRTEC) 10 MG tablet TAKE ONE (1) TABLET BY MOUTH DAILY 90 tablet 1     Cholecalciferol (VITAMIN D) 2000 units tablet Take 2,000 Units by mouth daily 100 tablet 3     divalproex sodium delayed-release (DEPAKOTE) 500 MG DR tablet Take 2 tablets (1,000 mg) by mouth 2 times daily 60 tablet 0     fish oil-omega-3 fatty acids 1000 MG capsule TAKE TWO (2) CAPSULES BY MOUTH DAILY 100 capsule 10     fluticasone (FLONASE) 50 MCG/ACT nasal spray Spray 1 spray into both nostrils daily 16.8 g 3     guanFACINE (TENEX) 1 MG tablet Take 1 tablet by mouth twice a day Take one tablet by mouth every day at 8 am and one tablet by mouth every day at 5 pm 60 tablet 0     hydrOXYzine (ATARAX) 10 MG tablet Take 1 tablet by mouth every 2 hours as needed for anxiety/agitation - max 6 tablets per day.  5     ipratropium - albuterol 0.5 mg/2.5 mg/3 mL (DUONEB) 0.5-2.5 (3) MG/3ML neb solution Take 1 vial (3 mLs) by nebulization every 4 hours as needed for shortness of breath / dyspnea or wheezing 1 Box 0     LORazepam (ATIVAN) 1 MG tablet Take 1 tablet (1 mg) by mouth 2 times daily 60 tablet 0     metFORMIN (GLUCOPHAGE-XR) 500 MG 24 hr tablet Take 1 tablet (500 mg) by mouth daily (with dinner) 60 tablet 3     OLANZapine (ZYPREXA) 10 MG tablet Take 1 tablet by mouth once a day Take one tablet by mouth every day at 7 am 30 tablet 5     OLANZapine (ZYPREXA) 20 MG tablet Take 20 mg by mouth daily Take with the 10mg.       OLANZAPINE PO Take 5 mg by mouth daily 11:00am       omeprazole (PRILOSEC) 40 MG DR capsule Take 1 capsule (40 mg) by mouth two times daily 60 capsule 9     polyethylene glycol (MIRALAX/GLYCOLAX) powder Take 17 g (1 capful) by mouth daily 510 g 5     risperiDONE (RISPERDAL) 0.25 MG tablet Take 0.25 mg by mouth 2 times daily  4     SENEXON-S 8.6-50 MG tablet Take 1 tablet  by mouth 2 times daily 60 tablet 5     VITAMIN D, CHOLECALCIFEROL, PO Take 50,000 Units by mouth every 7 days       metFORMIN (GLUCOPHAGE) 500 MG tablet Take 1 tablet (500 mg) by mouth 2 times daily (with meals) (Patient not taking: Reported on 4/24/2019) 60 tablet 3     No Known Allergies  Recent Labs   Lab Test 04/15/19  1619 09/07/18  0847 06/29/18  0825 05/01/18  1415 01/09/18  1146  05/08/17  0820 11/15/16  0830  05/18/16  0820   A1C 8.3*  --   --   --   --   --  5.0 4.7  --  4.8   LDL  --   --   --  Cannot estimate LDL when triglyceride exceeds 400 mg/dL  --   --   --  62  --  63   HDL  --   --   --  28*  --   --   --  35*  --  39*   TRIG  --   --   --  497*  --   --   --  191*  --  151*   * 110* 111*  --   --   --  93*  --   --   --    CR 0.63*  --   --   --  0.62*   < > 0.79  --    < >  --    GFRESTIMATED >90  --   --   --  >90   < > >90  Non  GFR Calc    --    < >  --    GFRESTBLACK >90  --   --   --  >90   < > >90  African American GFR Calc    --    < >  --    POTASSIUM 3.7  --   --   --  4.2   < > 4.4  --    < >  --    TSH 2.65  --   --   --  1.80  --  1.53  --   --   --     < > = values in this interval not displayed.      BP Readings from Last 3 Encounters:   04/24/19 110/71   04/15/19 103/71   12/24/18 141/96    Wt Readings from Last 3 Encounters:   04/24/19 112 kg (247 lb)   11/06/18 113.9 kg (251 lb)   09/19/18 113.9 kg (251 lb)                    Reviewed and updated as needed this visit by clinical staff  Tobacco  Allergies  Meds       Reviewed and updated as needed this visit by Provider         ROS:  Constitutional, HEENT, cardiovascular, pulmonary, gi and gu systems are negative, except as otherwise noted.    OBJECTIVE:     /71 (BP Location: Right arm, Patient Position: Sitting, Cuff Size: Adult Regular)   Pulse 87   Temp 97.6  F (36.4  C) (Tympanic)   Resp 16   Wt 112 kg (247 lb)   SpO2 96%   BMI 33.97 kg/m    Body mass index is 33.97 kg/m .  Physical Exam    Constitutional: He is oriented to person, place, and time. He appears well-developed and well-nourished. No distress.   Neurological: He is alert and oriented to person, place, and time.   Psychiatric: He has a normal mood and affect.       Other exam not repeated.  Diagnostic Test Results:  Results for orders placed or performed during the hospital encounter of 04/15/19   UA reflex to Microscopic and Culture   Result Value Ref Range    Color Urine Light Yellow     Appearance Urine Clear     Glucose Urine >1000 (A) NEG^Negative mg/dL    Bilirubin Urine Negative NEG^Negative    Ketones Urine 10 (A) NEG^Negative mg/dL    Specific Gravity Urine 1.033 1.003 - 1.035    Blood Urine Negative NEG^Negative    pH Urine 6.0 4.7 - 8.0 pH    Protein Albumin Urine 100 (A) NEG^Negative mg/dL    Urobilinogen mg/dL Normal 0.0 - 2.0 mg/dL    Nitrite Urine Negative NEG^Negative    Leukocyte Esterase Urine Negative NEG^Negative    Source Midstream Urine     RBC Urine 2 0 - 2 /HPF    WBC Urine 2 0 - 5 /HPF    Bacteria Urine None (A) NEG^Negative /HPF    Mucous Urine Present (A) NEG^Negative /LPF   Urine Drugs of Abuse Screen Panel 13   Result Value Ref Range    Cannabinoids (87-lot-0-carboxy-9-THC) Not Detected NDET^Not Detected ng/mL    Phencyclidine (Phencyclidine) Not Detected NDET^Not Detected ng/mL    Cocaine (Benzoylecgonine) Not Detected NDET^Not Detected ng/mL    Methamphetamine (d-Methamphetamine) Not Detected NDET^Not Detected ng/mL    Opiates (Morphine) Not Detected NDET^Not Detected ng/mL    Amphetamine (d-Amphetamine) Not Detected NDET^Not Detected ng/mL    Benzodiazepines (Nordiazepam) Detected, Abnormal Result (A) NDET^Not Detected ng/mL    Tricyclic Antidepressants (Desipramine) Not Detected NDET^Not Detected ng/mL    Methadone (Methadone) Not Detected NDET^Not Detected ng/mL    Barbiturates (Butalbital) Not Detected NDET^Not Detected ng/mL    Oxycodone (Oxycodone) Not Detected NDET^Not Detected ng/mL    Propoxyphene  (Norpropoxyphene) Not Detected NDET^Not Detected ng/mL    Buprenorphine (Buprenorphine) Not Detected NDET^Not Detected ng/mL   Alcohol ethyl   Result Value Ref Range    Ethanol g/dL <0.01 0.01 g/dL   CBC with platelets differential   Result Value Ref Range    WBC 4.4 4.0 - 11.0 10e9/L    RBC Count 5.11 4.4 - 5.9 10e12/L    Hemoglobin 15.2 13.3 - 17.7 g/dL    Hematocrit 43.3 40.0 - 53.0 %    MCV 85 78 - 100 fl    MCH 29.7 26.5 - 33.0 pg    MCHC 35.1 31.5 - 36.5 g/dL    RDW 12.7 10.0 - 15.0 %    Platelet Count 114 (L) 150 - 450 10e9/L    Diff Method Automated Method     % Neutrophils 49.4 %    % Lymphocytes 40.0 %    % Monocytes 8.3 %    % Eosinophils 1.4 %    % Basophils 0.7 %    % Immature Granulocytes 0.2 %    Nucleated RBCs 0 0 /100    Absolute Neutrophil 2.2 1.6 - 8.3 10e9/L    Absolute Lymphocytes 1.7 0.8 - 5.3 10e9/L    Absolute Monocytes 0.4 0.0 - 1.3 10e9/L    Absolute Eosinophils 0.1 0.0 - 0.7 10e9/L    Absolute Basophils 0.0 0.0 - 0.2 10e9/L    Abs Immature Granulocytes 0.0 0 - 0.4 10e9/L    Absolute Nucleated RBC 0.0    Comprehensive metabolic panel   Result Value Ref Range    Sodium 136 133 - 144 mmol/L    Potassium 3.7 3.4 - 5.3 mmol/L    Chloride 102 94 - 109 mmol/L    Carbon Dioxide 23 20 - 32 mmol/L    Anion Gap 11 3 - 14 mmol/L    Glucose 302 (H) 70 - 99 mg/dL    Urea Nitrogen 14 7 - 30 mg/dL    Creatinine 0.63 (L) 0.66 - 1.25 mg/dL    GFR Estimate >90 >60 mL/min/[1.73_m2]    GFR Estimate If Black >90 >60 mL/min/[1.73_m2]    Calcium 9.0 8.5 - 10.1 mg/dL    Bilirubin Total 0.5 0.2 - 1.3 mg/dL    Albumin 4.2 3.4 - 5.0 g/dL    Protein Total 8.0 6.8 - 8.8 g/dL    Alkaline Phosphatase 72 40 - 150 U/L     (H) 0 - 70 U/L    AST 69 (H) 0 - 45 U/L   TSH   Result Value Ref Range    TSH 2.65 0.40 - 4.00 mU/L   Hemoglobin A1c   Result Value Ref Range    Hemoglobin A1C 8.3 (H) 0 - 5.6 %   Estimated Average Glucose   Result Value Ref Range    Estimated Average Glucose 192 mg/dL       ASSESSMENT/PLAN:      Type 2 diabetes mellitus without complication, without long-term current use of insulin (H)  New onset.  Discussed with staff.Continue same medications as outlined.[facility]   scheduled for evaluation and recommendations for further management.   - DIABETES EDUCATION REFERRAL ([place])            Berny Velasquez MD  [facility] - [place]

## 2019-04-25 ASSESSMENT — ASTHMA QUESTIONNAIRES: ACT_TOTALSCORE: 23

## 2019-04-30 NOTE — PATIENT INSTRUCTIONS
Appointment with [facility] scheduled for evaluation and recommendations for further management.

## 2019-05-07 ENCOUNTER — OFFICE VISIT (OUTPATIENT)
Dept: FAMILY MEDICINE | Facility: OTHER | Age: 39
End: 2019-05-07
Attending: FAMILY MEDICINE
Payer: MEDICARE

## 2019-05-07 VITALS
DIASTOLIC BLOOD PRESSURE: 80 MMHG | OXYGEN SATURATION: 98 % | TEMPERATURE: 96.4 F | BODY MASS INDEX: 36.29 KG/M2 | HEART RATE: 84 BPM | WEIGHT: 245 LBS | SYSTOLIC BLOOD PRESSURE: 109 MMHG | HEIGHT: 69 IN

## 2019-05-07 DIAGNOSIS — F84.0 AUTISM: ICD-10-CM

## 2019-05-07 DIAGNOSIS — F70 MILD MENTAL RETARDATION (I.Q. 50-70): ICD-10-CM

## 2019-05-07 DIAGNOSIS — Z00.00 ROUTINE GENERAL MEDICAL EXAMINATION AT A HEALTH CARE FACILITY: Primary | ICD-10-CM

## 2019-05-07 DIAGNOSIS — E11.9 TYPE 2 DIABETES MELLITUS WITHOUT COMPLICATION, WITHOUT LONG-TERM CURRENT USE OF INSULIN (H): ICD-10-CM

## 2019-05-07 PROCEDURE — G0463 HOSPITAL OUTPT CLINIC VISIT: HCPCS

## 2019-05-07 PROCEDURE — 99395 PREV VISIT EST AGE 18-39: CPT | Performed by: FAMILY MEDICINE

## 2019-05-07 ASSESSMENT — ASTHMA QUESTIONNAIRES
ACT_TOTALSCORE: 25
QUESTION_4 LAST FOUR WEEKS HOW OFTEN HAVE YOU USED YOUR RESCUE INHALER OR NEBULIZER MEDICATION (SUCH AS ALBUTEROL): NOT AT ALL
QUESTION_5 LAST FOUR WEEKS HOW WOULD YOU RATE YOUR ASTHMA CONTROL: COMPLETELY CONTROLLED
ACUTE_EXACERBATION_TODAY: NO
QUESTION_1 LAST FOUR WEEKS HOW MUCH OF THE TIME DID YOUR ASTHMA KEEP YOU FROM GETTING AS MUCH DONE AT WORK, SCHOOL OR AT HOME: NONE OF THE TIME
QUESTION_3 LAST FOUR WEEKS HOW OFTEN DID YOUR ASTHMA SYMPTOMS (WHEEZING, COUGHING, SHORTNESS OF BREATH, CHEST TIGHTNESS OR PAIN) WAKE YOU UP AT NIGHT OR EARLIER THAN USUAL IN THE MORNING: NOT AT ALL
QUESTION_2 LAST FOUR WEEKS HOW OFTEN HAVE YOU HAD SHORTNESS OF BREATH: NOT AT ALL

## 2019-05-07 ASSESSMENT — MIFFLIN-ST. JEOR: SCORE: 2036.3

## 2019-05-07 NOTE — NURSING NOTE
"Chief Complaint   Patient presents with     Physical       Initial /80 (BP Location: Left arm, Patient Position: Sitting, Cuff Size: Adult Regular)   Pulse 84   Temp 96.4  F (35.8  C) (Tympanic)   Ht 1.76 m (5' 9.29\")   Wt 111.1 kg (245 lb)   SpO2 98%   BMI 35.88 kg/m   Estimated body mass index is 35.88 kg/m  as calculated from the following:    Height as of this encounter: 1.76 m (5' 9.29\").    Weight as of this encounter: 111.1 kg (245 lb).  Medication Reconciliation: complete    Nancy Sehrman LPN  "

## 2019-05-07 NOTE — PROGRESS NOTES
SUBJECTIVE:  Casey Pedro, 38 year old, male is seen for Annual [facility] Examination.  He generally feels well.     He was recently diagnosed with diabetes mellitus, type 2 and will be seeing [facility]. He has been on dietary management..    Denies chest pain, dyspnea, decreased exercise tolerance, dizzyness, nausea, vomiting, diaphoresis, palpitations, numbness, tingling, or paresthesias.      Current Outpatient Medications   Medication Sig Dispense Refill     albuterol (PROAIR HFA/PROVENTIL HFA/VENTOLIN HFA) 108 (90 Base) MCG/ACT Inhaler Inhale 2 puffs into the lungs every 6 hours       cetirizine (ZYRTEC) 10 MG tablet TAKE ONE (1) TABLET BY MOUTH DAILY 90 tablet 1     Cholecalciferol (VITAMIN D) 2000 units tablet Take 2,000 Units by mouth daily 100 tablet 3     divalproex sodium delayed-release (DEPAKOTE) 500 MG DR tablet Take 2 tablets (1,000 mg) by mouth 2 times daily 60 tablet 0     fish oil-omega-3 fatty acids 1000 MG capsule TAKE TWO (2) CAPSULES BY MOUTH DAILY 100 capsule 10     fluticasone (FLONASE) 50 MCG/ACT nasal spray Spray 1 spray into both nostrils daily 16.8 g 3     guanFACINE (TENEX) 1 MG tablet Take 1 tablet by mouth twice a day Take one tablet by mouth every day at 8 am and one tablet by mouth every day at 5 pm 60 tablet 0     hydrOXYzine (ATARAX) 10 MG tablet Take 1 tablet by mouth every 2 hours as needed for anxiety/agitation - max 6 tablets per day.  5     ipratropium - albuterol 0.5 mg/2.5 mg/3 mL (DUONEB) 0.5-2.5 (3) MG/3ML neb solution Take 1 vial (3 mLs) by nebulization every 4 hours as needed for shortness of breath / dyspnea or wheezing 1 Box 0     LORazepam (ATIVAN) 1 MG tablet Take 1 tablet (1 mg) by mouth 2 times daily 60 tablet 0     metFORMIN (GLUCOPHAGE) 500 MG tablet Take 1 tablet (500 mg) by mouth 2 times daily (with meals) 60 tablet 3     OLANZapine (ZYPREXA) 10 MG tablet Take 1 tablet by mouth once a day Take one tablet by mouth every day at 7 am 30 tablet 5      OLANZapine (ZYPREXA) 20 MG tablet Take 20 mg by mouth daily Take with the 10mg.       OLANZAPINE PO Take 5 mg by mouth daily 11:00am       omeprazole (PRILOSEC) 40 MG DR capsule Take 1 capsule (40 mg) by mouth two times daily 60 capsule 9     polyethylene glycol (MIRALAX/GLYCOLAX) powder Take 17 g (1 capful) by mouth daily 510 g 5     risperiDONE (RISPERDAL) 0.25 MG tablet Take 0.25 mg by mouth 2 times daily  4     SENEXON-S 8.6-50 MG tablet Take 1 tablet by mouth 2 times daily 60 tablet 5     VITAMIN D, CHOLECALCIFEROL, PO Take 50,000 Units by mouth every 7 days       metFORMIN (GLUCOPHAGE-XR) 500 MG 24 hr tablet Take 1 tablet (500 mg) by mouth daily (with dinner) (Patient not taking: Reported on 5/7/2019) 60 tablet 3      No Known Allergies    Past Medical History:   Diagnosis Date     Autism 09/16/2011     BPH 09/16/2011     Diabetes mellitus, type 2 (H)      Mental retardation 09/16/2011     History reviewed. No pertinent surgical history.  Family History   Problem Relation Age of Onset     Alcohol/Drug Father         alcoholism     Social History     Socioeconomic History     Marital status: Single     Spouse name: Not on file     Number of children: Not on file     Years of education: Not on file     Highest education level: Not on file   Occupational History     Not on file   Social Needs     Financial resource strain: Not on file     Food insecurity:     Worry: Not on file     Inability: Not on file     Transportation needs:     Medical: Not on file     Non-medical: Not on file   Tobacco Use     Smoking status: Never Smoker     Smokeless tobacco: Never Used     Tobacco comment: no passive exposure   Substance and Sexual Activity     Alcohol use: No     Drug use: No     Sexual activity: Never   Lifestyle     Physical activity:     Days per week: Not on file     Minutes per session: Not on file     Stress: Not on file   Relationships     Social connections:     Talks on phone: Not on file     Gets together:  Not on file     Attends Jainism service: Not on file     Active member of club or organization: Not on file     Attends meetings of clubs or organizations: Not on file     Relationship status: Not on file     Intimate partner violence:     Fear of current or ex partner: Not on file     Emotionally abused: Not on file     Physically abused: Not on file     Forced sexual activity: Not on file   Other Topics Concern      Service Not Asked     Blood Transfusions Not Asked     Caffeine Concern No     Occupational Exposure Not Asked     Hobby Hazards Not Asked     Sleep Concern Not Asked     Stress Concern Not Asked     Weight Concern Not Asked     Special Diet Not Asked     Back Care Not Asked     Exercise Yes     Comment: daily     Bike Helmet Not Asked     Seat Belt Yes     Self-Exams Not Asked     Parent/sibling w/ CABG, MI or angioplasty before 65F 55M? No     Comment: unknown   Social History Narrative     Not on file         Review Of Systems  Constitutional, neuro, ENT, endocrine, pulmonary, cardiac, gastrointestinal, genitourinary, musculoskeletal, integument and psychiatric systems are negative, except as otherwise noted by staff.    OBJECTIVE:  Vitals: B/P: 110/68, T: 97.7, P: 92, R: 18    Exam:  Physical Exam   Constitutional: He is oriented to person, place, and time. He appears well-developed and well-nourished. No distress.   HENT:   Head: Normocephalic and atraumatic.   Right Ear: External ear normal.   Left Ear: External ear normal.   Nose: Nose normal.   Mouth/Throat: Oropharynx is clear and moist.   Eyes: Pupils are equal, round, and reactive to light. Conjunctivae and EOM are normal.   Neck: Normal range of motion. Neck supple. No JVD present. No thyromegaly present.   Cardiovascular: Normal rate, regular rhythm, normal heart sounds and intact distal pulses. Exam reveals no gallop and no friction rub.   No murmur heard.  Pulmonary/Chest: Effort normal and breath sounds normal. He has no  wheezes. He has no rales.   Abdominal: Soft. Bowel sounds are normal. He exhibits no mass. There is no tenderness.   Genitourinary: Rectum normal, prostate normal and penis normal.   Musculoskeletal: Normal range of motion.   Lymphadenopathy:     He has no cervical adenopathy.   Neurological: He is alert and oriented to person, place, and time. He has normal reflexes.   Skin: Skin is warm and dry.   Psychiatric: He has a normal mood and affect.         Labs:  Office Visit on 11/18/2015   Component Date Value Ref Range Status     WBC 11/18/2015 6.8  4.0 - 11.0 10e9/L Final     RBC Count 11/18/2015 5.46  4.4 - 5.9 10e12/L Final     Hemoglobin 11/18/2015 15.9  13.3 - 17.7 g/dL Final     Hematocrit 11/18/2015 45.7  40.0 - 53.0 % Final     MCV 11/18/2015 84  78 - 100 fl Final     MCH 11/18/2015 29.1  26.5 - 33.0 pg Final     MCHC 11/18/2015 34.8  31.5 - 36.5 g/dL Final     RDW 11/18/2015 13.3  10.0 - 15.0 % Final     Platelet Count 11/18/2015 213  150 - 450 10e9/L Final     Diff Method 11/18/2015 Automated Method   Final     % Neutrophils 11/18/2015 60.5   Final     % Lymphocytes 11/18/2015 24.1   Final     % Monocytes 11/18/2015 12.1   Final     % Eosinophils 11/18/2015 2.7   Final     % Basophils 11/18/2015 0.3   Final     % Immature Granulocytes 11/18/2015 0.3   Final     Absolute Neutrophil 11/18/2015 4.1  1.6 - 8.3 10e9/L Final     Absolute Lymphocytes 11/18/2015 1.6  0.8 - 5.3 10e9/L Final     Absolute Monoctyes 11/18/2015 0.8  0.0 - 1.3 10e9/L Final     Absolute Eosinophils 11/18/2015 0.2  0.0 - 0.7 10e9/L Final     Absolute Basophils 11/18/2015 0.0  0.0 - 0.2 10e9/L Final     Abs Immature Granulocytes 11/18/2015 0.0  0 - 0.4 10e9/L Final     Sodium 11/18/2015 139  133 - 144 mmol/L Final     Potassium 11/18/2015 4.2  3.4 - 5.3 mmol/L Final     Chloride 11/18/2015 108  94 - 109 mmol/L Final     Carbon Dioxide 11/18/2015 26  20 - 32 mmol/L Final     Anion Gap 11/18/2015 5  3 - 14 mmol/L Final     Glucose 11/18/2015  88  70 - 99 mg/dL Final     Urea Nitrogen 11/18/2015 11  7 - 30 mg/dL Final     Creatinine 11/18/2015 0.76  0.66 - 1.25 mg/dL Final     GFR Estimate 11/18/2015   >60 mL/min/1.7m2 Final                    Value:>90  Non  GFR Calc       GFR Estimate If Black 11/18/2015   >60 mL/min/1.7m2 Final                    Value:>90   GFR Calc       Calcium 11/18/2015 9.1  8.5 - 10.1 mg/dL Final     TSH 11/18/2015 0.68  0.40 - 4.00 mU/L Final       ASSESSMENT/PLAN:  Annual [facility] Examination  Discussed with staff.  Current medications, allergies, and plan of care reviewed. Nutritional and screening labs drawn.  Resident is free of communicable disease and does not need 24 hour professional nursing care.  Routine exam one year.        Mental retardation  Stable    Autism  Behaviors reviewed              Berny Velasquez MD

## 2019-05-08 ASSESSMENT — ASTHMA QUESTIONNAIRES: ACT_TOTALSCORE: 25

## 2019-05-13 ENCOUNTER — HOSPITAL ENCOUNTER (OUTPATIENT)
Dept: EDUCATION SERVICES | Facility: HOSPITAL | Age: 39
Discharge: HOME | End: 2019-05-13
Attending: FAMILY MEDICINE | Admitting: FAMILY MEDICINE
Payer: MEDICARE

## 2019-05-13 VITALS
WEIGHT: 244.4 LBS | OXYGEN SATURATION: 98 % | DIASTOLIC BLOOD PRESSURE: 69 MMHG | SYSTOLIC BLOOD PRESSURE: 112 MMHG | BODY MASS INDEX: 35.79 KG/M2 | HEART RATE: 85 BPM

## 2019-05-13 DIAGNOSIS — E11.9 TYPE 2 DIABETES MELLITUS WITHOUT COMPLICATION, WITHOUT LONG-TERM CURRENT USE OF INSULIN (H): Primary | ICD-10-CM

## 2019-05-13 PROCEDURE — G0108 DIAB MANAGE TRN  PER INDIV: HCPCS

## 2019-05-13 ASSESSMENT — PAIN SCALES - GENERAL: PAINLEVEL: NO PAIN (0)

## 2019-05-13 NOTE — PROGRESS NOTES
"Diabetes Self-Management Education & Support    Diabetes Education Self Management & Training - Session 1    SUBJECTIVE/OBJECTIVE:  Presents for: Initial Assessment for new diagnosis  Accompanied by: Self, Other(RN,  and 2 staff)  Diabetes education in the past 24mo: No  Focus of Visit: Patient Unsure  Diabetes type: Type 2  Date of diagnosis: 4/15/19  Disease course: Stable  Diabetes management related comments/concerns: Food plan, testing  Transportation concerns: No  Other concerns:: Cognitive impairment  Cultural Influences/Ethnic Background:  American      Diabetes Symptoms & Complications  Blurred vision: No  Fatigue: No  Neuropathy: No  Foot ulcerations: No  Polydipsia: No  Polyphagia: No  Polyuria: No  Visual change: No  Weakness: No  Weight loss: No  Slow healing wounds: No  Recent Infection(s): No  Symptom course: Stable  Weight trend: Decreasing steadily  Autonomic neuropathy: No  CVA: No  Heart disease: No  Nephropathy: No  Peripheral neuropathy: No  Peripheral Vascular Disease: No  Retinopathy: No    Patient Problem List and Family Medical History reviewed for relevant medical history, current medical status, and diabetes risk factors.    Vitals:  /69   Pulse 85   Wt 110.9 kg (244 lb 6.4 oz)   SpO2 98%   BMI 35.79 kg/m    Estimated body mass index is 35.79 kg/m  as calculated from the following:    Height as of 5/7/19: 1.76 m (5' 9.29\").    Weight as of this encounter: 110.9 kg (244 lb 6.4 oz).   Last 3 BP:   BP Readings from Last 3 Encounters:   05/13/19 112/69   05/07/19 109/80   04/24/19 110/71       History   Smoking Status     Never Smoker   Smokeless Tobacco     Never Used     Comment: no passive exposure       Labs:  Lab Results   Component Value Date    A1C 8.3 04/15/2019     Lab Results   Component Value Date     04/15/2019     Lab Results   Component Value Date    LDL  05/01/2018     Cannot estimate LDL when triglyceride exceeds 400 mg/dL     HDL Cholesterol "   Date Value Ref Range Status   05/01/2018 28 (L) >39 mg/dL Final   ]  GFR Estimate   Date Value Ref Range Status   04/15/2019 >90 >60 mL/min/[1.73_m2] Final     Comment:     Non  GFR Calc  Starting 12/18/2018, serum creatinine based estimated GFR (eGFR) will be   calculated using the Chronic Kidney Disease Epidemiology Collaboration   (CKD-EPI) equation.       GFR Estimate If Black   Date Value Ref Range Status   04/15/2019 >90 >60 mL/min/[1.73_m2] Final     Comment:      GFR Calc  Starting 12/18/2018, serum creatinine based estimated GFR (eGFR) will be   calculated using the Chronic Kidney Disease Epidemiology Collaboration   (CKD-EPI) equation.       Lab Results   Component Value Date    CR 0.63 04/15/2019     No results found for: MICROALBUMIN    Healthy Eating  Healthy Eating Assessed Today: Yes  Cultural/Buddhism diet restrictions?: No  Patient on a regular basis: Eats 3 meals a day  Meal planning: Avoiding sweets, Smaller portions  Meals include: Breakfast, Lunch, Dinner, Snacks  Breakfast: [employer] Cereal, low carb pancakes, eggs/toast  Lunch 1 sandwich with baked chips, SF pudding cup  Dinner: meat/noodles   Snacks: fruit, popcorn, halo ice cream  Beverages: Water, Milk, Diet soda  Has patient met with a dietitian in the past?: No    Being Active  Being Active Assessed Today: No    Monitoring  Monitoring Assessed Today: No(Not testing yet)        Taking Medications  Diabetes Medication(s)     Biguanides       metFORMIN (GLUCOPHAGE-XR) 500 MG 24 hr tablet    Take 1 tablet (500 mg) by mouth daily (with dinner)          Taking Medication Assessed Today: Yes  Current Treatments: Oral Agent (monotherapy)  Problems taking diabetes medications regularly?: No  Diabetes medication side effects?: No  Treatment Compliance: All of the time    Problem Solving  Problem Solving Assessed Today: No              Reducing Risks       Healthy Coping  Healthy Coping Assessed Today:  Yes  Emotional response to diabetes: Unable to access  Informal Support system:: (Group Home staff)  Difficulty affording diabetes management supplies?: No  Support resources: None  Patient Activation Measure Survey Score:  No flowsheet data found.    ASSESSMENT:  Casey is here today with [facility] staff: Mita, : Carly and RN: Gerda. They have made many changes to Casey's food choices such as portion size decrease, sugar free jello and pudding, lower carb Halo ice cream, low carb recipes.    They plan to do BG testing for Casey. I am unsure as to which meter is covered so will send prescription for unspecific meter and supplies. Gerda will train staff on how to do BG monitoring.        Patient's most recent   Lab Results   Component Value Date    A1C 8.3 04/15/2019    is not meeting goal of <8.0    INTERVENTION:   Diabetes knowledge and skills assessment:     Patient is knowledgeable in diabetes management concepts related to: new patient    Patient needs further education on the following diabetes management concepts: Healthy Eating, Monitoring and Taking Medication    Based on learning assessment above, most appropriate setting for further diabetes education would be: Individual setting.    Education provided today on:  AADE Self-Care Behaviors:  Healthy Eating: carbohydrate counting, consistency in amount, composition, and timing of food intake, weight reduction, portion control and label reading  Monitoring: purpose, log and interpret results, individual blood glucose targets and frequency of monitoring  Taking Medication: action of prescribed medication, side effects of prescribed medications and when to take medications    Opportunities for ongoing education and support in diabetes-self management were discussed.    Pt verbalized understanding of concepts discussed and recommendations provided today.       Education Materials Provided:  Type 2 Basics book, My Food Plan, Carb  Counter Book    PLAN:  See Patient Instructions for co-developed, patient-stated behavior change goals.  Test BG every other AM and then on opposite days test 2 hours after supper    Fax [phone] or drop off BG readings every 2 weeks. We will adjust Metformin dose based on BG readings.    Return to Diabetes Ed TBD    AVS printed and provided to patient today. See Follow-Up section for recommended follow-up.      Time Spent: 60 minutes  Encounter Type: Individual    Any diabetes medication dose changes were made via the CDE Protocol and Collaborative Practice Agreement with the patient's primary care provider. A copy of this encounter was shared with the provider.

## 2019-05-13 NOTE — PATIENT INSTRUCTIONS
Test BG every other AM and then on opposite days test 2 hours after supper    Fax [phone] or drop off BG readings every 2 weeks. We will adjust Metformin dose based on BG readings.    Return to Diabetes Ed TBD

## 2019-05-20 DIAGNOSIS — Z79.899 ENCOUNTER FOR LONG-TERM (CURRENT) USE OF MEDICATIONS: Primary | ICD-10-CM

## 2019-05-20 LAB
ALBUMIN SERPL-MCNC: 4.1 G/DL (ref 3.4–5)
ALP SERPL-CCNC: 52 U/L (ref 40–150)
ALT SERPL W P-5'-P-CCNC: 88 U/L (ref 0–70)
AST SERPL W P-5'-P-CCNC: 48 U/L (ref 0–45)
BILIRUB DIRECT SERPL-MCNC: 0.2 MG/DL (ref 0–0.2)
BILIRUB SERPL-MCNC: 0.5 MG/DL (ref 0.2–1.3)
ERYTHROCYTE [DISTWIDTH] IN BLOOD BY AUTOMATED COUNT: 13 % (ref 10–15)
HCT VFR BLD AUTO: 44.8 % (ref 40–53)
HGB BLD-MCNC: 15.6 G/DL (ref 13.3–17.7)
MCH RBC QN AUTO: 29.7 PG (ref 26.5–33)
MCHC RBC AUTO-ENTMCNC: 34.8 G/DL (ref 31.5–36.5)
MCV RBC AUTO: 85 FL (ref 78–100)
PLATELET # BLD AUTO: 143 10E9/L (ref 150–450)
PROT SERPL-MCNC: 7.9 G/DL (ref 6.8–8.8)
RBC # BLD AUTO: 5.25 10E12/L (ref 4.4–5.9)
VALPROATE SERPL-MCNC: 82 MG/L (ref 50–100)
WBC # BLD AUTO: 4.9 10E9/L (ref 4–11)

## 2019-05-20 PROCEDURE — 36415 COLL VENOUS BLD VENIPUNCTURE: CPT | Mod: ZL | Performed by: PSYCHIATRY & NEUROLOGY

## 2019-05-20 PROCEDURE — 80164 ASSAY DIPROPYLACETIC ACD TOT: CPT | Mod: ZL | Performed by: PSYCHIATRY & NEUROLOGY

## 2019-05-20 PROCEDURE — 80076 HEPATIC FUNCTION PANEL: CPT | Mod: ZL | Performed by: PSYCHIATRY & NEUROLOGY

## 2019-05-20 PROCEDURE — 85027 COMPLETE CBC AUTOMATED: CPT | Mod: ZL | Performed by: PSYCHIATRY & NEUROLOGY

## 2019-05-31 DIAGNOSIS — N42.9 DISORDER OF PROSTATE: Primary | ICD-10-CM

## 2019-05-31 NOTE — TELEPHONE ENCOUNTER
Alcohol Swabs  Last Written Prescription Date: Not previously prescribed  Last Fill Quantity: NA # of Refills: NA  Last Office Visit: 5/7/19

## 2019-06-04 RX ORDER — BLOOD PRESSURE TEST KIT
1 KIT MISCELLANEOUS EVERY 4 HOURS PRN
Qty: 180 EACH | Refills: 11 | Status: SHIPPED | OUTPATIENT
Start: 2019-06-04 | End: 2020-09-23

## 2019-06-24 DIAGNOSIS — Z00.00 HEALTH CARE MAINTENANCE: ICD-10-CM

## 2019-06-24 RX ORDER — CHLORAL HYDRATE 500 MG
CAPSULE ORAL
Qty: 100 CAPSULE | Refills: 7 | Status: SHIPPED | OUTPATIENT
Start: 2019-06-24 | End: 2020-06-16

## 2019-07-08 DIAGNOSIS — J30.1 SEASONAL ALLERGIC RHINITIS DUE TO POLLEN: ICD-10-CM

## 2019-07-08 RX ORDER — FLUTICASONE PROPIONATE 50 MCG
1 SPRAY, SUSPENSION (ML) NASAL DAILY
Qty: 16 G | Refills: 1 | Status: SHIPPED | OUTPATIENT
Start: 2019-07-08 | End: 2019-10-17

## 2019-07-15 DIAGNOSIS — J30.2 CHRONIC SEASONAL ALLERGIC RHINITIS: ICD-10-CM

## 2019-07-15 RX ORDER — CETIRIZINE HYDROCHLORIDE 10 MG/1
TABLET ORAL
Qty: 90 TABLET | Refills: 1 | OUTPATIENT
Start: 2019-07-15

## 2019-07-18 NOTE — PROGRESS NOTES
Subjective     Casey Pedro is a 38 year old male who presents to clinic today for the following health issues:    HPI   Diabetes Follow-up      How often are you checking your blood sugar? One time daily    What time of day are you checking your blood sugars (select all that apply)?  In am before means one day and after dinner the next , rotating back and forth.    Have you had any blood sugars above 200?  No    Have you had any blood sugars below 70?  No    What symptoms do you notice when your blood sugar is low?  None    What concerns do you have today about your diabetes? None     Do you have any of these symptoms? (Select all that apply)  No numbness or tingling in feet.  No redness, sores or blisters on feet.  No complaints of excessive thirst.  No reports of blurry vision.  No significant changes to weight.     Have you had a diabetic eye exam in the last 12 months? No    BP Readings from Last 2 Encounters:   07/23/19 103/72   05/13/19 112/69     Hemoglobin A1C (%)   Date Value   04/15/2019 8.3 (H)   05/08/2017 5.0     LDL Cholesterol Calculated (mg/dL)   Date Value   05/01/2018     Cannot estimate LDL when triglyceride exceeds 400 mg/dL   11/15/2016 62       Diabetes Management Resources    Amount of exercise or physical activity: 6-7 days/week for an average of greater than 60 minutes    Problems taking medications regularly: No    Medication side effects: none    Diet: zone diet and watches carbs      Patient Active Problem List   Diagnosis     BPH (begnign prostatic hyperplasia)     Mental retardation     Autism     Annual [facility] Examination     Disruptive behavior disorder     Seasonal allergic rhinitis     Mild intermittent asthma without complication     Slow transit constipation     Type 2 diabetes mellitus without complication, without long-term current use of insulin (H)     History reviewed. No pertinent surgical history.    Social History     Tobacco Use     Smoking status: Never Smoker      Smokeless tobacco: Never Used     Tobacco comment: no passive exposure   Substance Use Topics     Alcohol use: No     Family History   Problem Relation Age of Onset     Alcohol/Drug Father         alcoholism         Current Outpatient Medications   Medication Sig Dispense Refill     albuterol (PROAIR HFA/PROVENTIL HFA/VENTOLIN HFA) 108 (90 Base) MCG/ACT Inhaler Inhale 2 puffs into the lungs every 6 hours       Alcohol Swabs PADS 1 Application every 4 hours as needed 180 each 11     blood glucose (NO BRAND SPECIFIED) lancets standard Use to test blood sugar 2 times daily or as directed. 100 each 10     blood glucose (NO BRAND SPECIFIED) test strip Use to test blood sugar 2 times daily or as directed. 50 each 10     blood glucose monitoring (NO BRAND SPECIFIED) meter device kit Use to test blood sugar 2 times daily or as directed. 1 kit 0     cetirizine (ZYRTEC) 10 MG tablet TAKE ONE (1) TABLET BY MOUTH DAILY 90 tablet 1     Cholecalciferol (VITAMIN D) 2000 units tablet Take 2,000 Units by mouth daily 100 tablet 3     divalproex sodium delayed-release (DEPAKOTE) 500 MG DR tablet Take 2 tablets (1,000 mg) by mouth 2 times daily 60 tablet 0     fish oil-omega-3 fatty acids 1000 MG capsule TAKE TWO (2) CAPSULES BY MOUTH DAILY 100 capsule 7     fluticasone (FLONASE) 50 MCG/ACT nasal spray Spray 1 spray into both nostrils daily 16 g 1     guanFACINE (TENEX) 1 MG tablet Take 1 tablet by mouth twice a day Take one tablet by mouth every day at 8 am and one tablet by mouth every day at 5 pm 60 tablet 0     hydrOXYzine (ATARAX) 10 MG tablet Take 1 tablet by mouth every 2 hours as needed for anxiety/agitation - max 6 tablets per day.  5     ipratropium - albuterol 0.5 mg/2.5 mg/3 mL (DUONEB) 0.5-2.5 (3) MG/3ML neb solution Take 1 vial (3 mLs) by nebulization every 4 hours as needed for shortness of breath / dyspnea or wheezing 1 Box 0     LORazepam (ATIVAN) 1 MG tablet Take 1 tablet (1 mg) by mouth 2 times daily 60 tablet 0      metFORMIN (GLUCOPHAGE-XR) 500 MG 24 hr tablet Take 1 tablet (500 mg) by mouth daily (with dinner) 60 tablet 3     OLANZapine (ZYPREXA) 10 MG tablet Take 1 tablet by mouth once a day Take one tablet by mouth every day at 7 am 30 tablet 5     OLANZapine (ZYPREXA) 20 MG tablet Take 20 mg by mouth daily Take with the 10mg.       OLANZAPINE PO Take 5 mg by mouth daily 11:00am       omeprazole (PRILOSEC) 40 MG DR capsule Take 1 capsule (40 mg) by mouth two times daily 60 capsule 9     polyethylene glycol (MIRALAX/GLYCOLAX) powder Take 17 g (1 capful) by mouth daily 510 g 5     risperiDONE (RISPERDAL) 0.25 MG tablet Take 0.25 mg by mouth 2 times daily  4     SENEXON-S 8.6-50 MG tablet Take 1 tablet by mouth 2 times daily 60 tablet 5     VITAMIN D, CHOLECALCIFEROL, PO Take 50,000 Units by mouth every 7 days       No Known Allergies  Recent Labs   Lab Test 05/20/19  0809 04/15/19  1619 09/07/18  0847  05/01/18  1415 01/09/18  1146  05/08/17  0820 11/15/16  0830  05/18/16  0820   A1C  --  8.3*  --   --   --   --   --  5.0 4.7  --  4.8   LDL  --   --   --   --  Cannot estimate LDL when triglyceride exceeds 400 mg/dL  --   --   --  62  --  63   HDL  --   --   --   --  28*  --   --   --  35*  --  39*   TRIG  --   --   --   --  497*  --   --   --  191*  --  151*   ALT 88* 112* 110*   < >  --   --   --  93*  --   --   --    CR  --  0.63*  --   --   --  0.62*   < > 0.79  --    < >  --    GFRESTIMATED  --  >90  --   --   --  >90   < > >90  Non  GFR Calc    --    < >  --    GFRESTBLACK  --  >90  --   --   --  >90   < > >90  African American GFR Calc    --    < >  --    POTASSIUM  --  3.7  --   --   --  4.2   < > 4.4  --    < >  --    TSH  --  2.65  --   --   --  1.80  --  1.53  --   --   --     < > = values in this interval not displayed.      BP Readings from Last 3 Encounters:   07/23/19 103/72   05/13/19 112/69   05/07/19 109/80    Wt Readings from Last 3 Encounters:   07/23/19 104.8 kg (231 lb)   05/13/19 110.9 kg  "(244 lb 6.4 oz)   05/07/19 111.1 kg (245 lb)                 Reviewed and updated as needed this visit by Provider         Review of Systems   ROS COMP: Constitutional, HEENT, cardiovascular, pulmonary, gi and gu systems are negative, except as otherwise noted.      Objective    /72 (BP Location: Left arm, Patient Position: Sitting, Cuff Size: Adult Regular)   Pulse 67   Temp 99.3  F (37.4  C) (Tympanic)   Resp 18   Ht 1.753 m (5' 9\")   Wt 104.8 kg (231 lb)   SpO2 98%   BMI 34.11 kg/m    Body mass index is 34.11 kg/m .  Physical Exam   Constitutional: He is oriented to person, place, and time. He appears well-developed and well-nourished.   Neurological: He is alert and oriented to person, place, and time.   Psychiatric: He has a normal mood and affect.   Nursing note and vitals reviewed.     Other exam not repeated    Diagnostic Test Results:  Labs reviewed in Epic        Assessment & Plan     1. Type 2 diabetes mellitus without complication, without long-term current use of insulin (H)  Begin statin, angiotensin coverting enzyme inhibitor, and aspirin.  Continue metformin.  Fasting labs are ordered.  Goal of hemoglobin A1C of less than 7 discussed.  Instructed in the importance of diet, exercise, and good glycemic control in prevention of secondary complications.    Continue same medication regimen. Repeat fasting glucose and hemoglobin A1C in 3 months.   - Hemoglobin A1c  - Albumin Random Urine Quantitative with Creat Ratio  - atorvastatin (LIPITOR) 40 MG tablet; Take 1 tablet (40 mg) by mouth daily  Dispense: 90 tablet; Refill: 3  - lisinopril (PRINIVIL/ZESTRIL) 5 MG tablet; Take 1 tablet (5 mg) by mouth daily  Dispense: 90 tablet; Refill: 3  - aspirin (ASA) 81 MG tablet; Take 1 tablet (81 mg) by mouth daily  Dispense: 90 tablet; Refill: 3     BMI:   Estimated body mass index is 34.11 kg/m  as calculated from the following:    Height as of this encounter: 1.753 m (5' 9\").    Weight as of this " encounter: 104.8 kg (231 lb).   Weight management plan: Discussed healthy diet and exercise guidelines        See Patient Instructions    No follow-ups on file.    Berny Velasquez MD  [facility]

## 2019-07-23 ENCOUNTER — OFFICE VISIT (OUTPATIENT)
Dept: FAMILY MEDICINE | Facility: OTHER | Age: 39
End: 2019-07-23
Attending: FAMILY MEDICINE
Payer: MEDICARE

## 2019-07-23 VITALS
HEART RATE: 67 BPM | DIASTOLIC BLOOD PRESSURE: 72 MMHG | HEIGHT: 69 IN | OXYGEN SATURATION: 98 % | WEIGHT: 231 LBS | RESPIRATION RATE: 18 BRPM | TEMPERATURE: 99.3 F | SYSTOLIC BLOOD PRESSURE: 103 MMHG | BODY MASS INDEX: 34.21 KG/M2

## 2019-07-23 DIAGNOSIS — E11.9 TYPE 2 DIABETES MELLITUS WITHOUT COMPLICATION, WITHOUT LONG-TERM CURRENT USE OF INSULIN (H): Primary | ICD-10-CM

## 2019-07-23 LAB
CREAT UR-MCNC: 87 MG/DL
EST. AVERAGE GLUCOSE BLD GHB EST-MCNC: 114 MG/DL
HBA1C MFR BLD: 5.6 % (ref 0–5.6)
MICROALBUMIN UR-MCNC: <5 MG/L
MICROALBUMIN/CREAT UR: NORMAL MG/G CR (ref 0–17)

## 2019-07-23 PROCEDURE — G0463 HOSPITAL OUTPT CLINIC VISIT: HCPCS

## 2019-07-23 PROCEDURE — 82043 UR ALBUMIN QUANTITATIVE: CPT | Mod: ZL | Performed by: FAMILY MEDICINE

## 2019-07-23 PROCEDURE — 36415 COLL VENOUS BLD VENIPUNCTURE: CPT | Mod: ZL | Performed by: FAMILY MEDICINE

## 2019-07-23 PROCEDURE — 99213 OFFICE O/P EST LOW 20 MIN: CPT | Performed by: FAMILY MEDICINE

## 2019-07-23 PROCEDURE — 40000788 ZZHCL STATISTIC ESTIMATED AVERAGE GLUCOSE: Mod: ZL | Performed by: FAMILY MEDICINE

## 2019-07-23 PROCEDURE — 83036 HEMOGLOBIN GLYCOSYLATED A1C: CPT | Mod: ZL | Performed by: FAMILY MEDICINE

## 2019-07-23 RX ORDER — ATORVASTATIN CALCIUM 40 MG/1
40 TABLET, FILM COATED ORAL DAILY
Qty: 90 TABLET | Refills: 3 | Status: SHIPPED | OUTPATIENT
Start: 2019-07-23 | End: 2020-06-09

## 2019-07-23 RX ORDER — LISINOPRIL 5 MG/1
5 TABLET ORAL DAILY
Qty: 90 TABLET | Refills: 3 | Status: SHIPPED | OUTPATIENT
Start: 2019-07-23 | End: 2020-06-09

## 2019-07-23 ASSESSMENT — MIFFLIN-ST. JEOR: SCORE: 1968.19

## 2019-07-23 ASSESSMENT — PAIN SCALES - GENERAL: PAINLEVEL: NO PAIN (0)

## 2019-07-23 NOTE — LETTER
July 23, 2019      Casey Pedro  [address] [address]        Dear PatriaWillis,    We are writing to inform you of your test results.        Resulted Orders   Hemoglobin A1c   Result Value Ref Range    Hemoglobin A1C 5.6 0 - 5.6 %      Comment:      Normal <5.7% Prediabetes 5.7-6.4%  Diabetes 6.5% or higher - adopted from ADA   consensus guidelines.         If you have any questions or concerns, please call the clinic at the number listed above.       Sincerely,        Berny Velasquez MD

## 2019-09-12 DIAGNOSIS — K59.01 SLOW TRANSIT CONSTIPATION: ICD-10-CM

## 2019-09-13 RX ORDER — DOCUSATE SODIUM 50MG AND SENNOSIDES 8.6MG 8.6; 5 MG/1; MG/1
TABLET, FILM COATED ORAL
Qty: 60 TABLET | Refills: 2 | Status: SHIPPED | OUTPATIENT
Start: 2019-09-13 | End: 2019-11-06

## 2019-10-11 ENCOUNTER — OFFICE VISIT (OUTPATIENT)
Dept: FAMILY MEDICINE | Facility: OTHER | Age: 39
End: 2019-10-11
Attending: FAMILY MEDICINE
Payer: MEDICARE

## 2019-10-11 VITALS
WEIGHT: 229 LBS | SYSTOLIC BLOOD PRESSURE: 100 MMHG | RESPIRATION RATE: 18 BRPM | HEART RATE: 73 BPM | OXYGEN SATURATION: 97 % | HEIGHT: 69 IN | TEMPERATURE: 96.9 F | BODY MASS INDEX: 33.92 KG/M2 | DIASTOLIC BLOOD PRESSURE: 64 MMHG

## 2019-10-11 DIAGNOSIS — Z23 NEED FOR PROPHYLACTIC VACCINATION AND INOCULATION AGAINST INFLUENZA: ICD-10-CM

## 2019-10-11 DIAGNOSIS — E11.9 TYPE 2 DIABETES MELLITUS WITHOUT COMPLICATION, WITHOUT LONG-TERM CURRENT USE OF INSULIN (H): Primary | ICD-10-CM

## 2019-10-11 PROCEDURE — 90686 IIV4 VACC NO PRSV 0.5 ML IM: CPT

## 2019-10-11 PROCEDURE — 99213 OFFICE O/P EST LOW 20 MIN: CPT | Performed by: FAMILY MEDICINE

## 2019-10-11 PROCEDURE — G0463 HOSPITAL OUTPT CLINIC VISIT: HCPCS | Mod: 25

## 2019-10-11 PROCEDURE — G0009 ADMIN PNEUMOCOCCAL VACCINE: HCPCS | Performed by: FAMILY MEDICINE

## 2019-10-11 PROCEDURE — G0008 ADMIN INFLUENZA VIRUS VAC: HCPCS | Performed by: FAMILY MEDICINE

## 2019-10-11 PROCEDURE — 90732 PPSV23 VACC 2 YRS+ SUBQ/IM: CPT

## 2019-10-11 ASSESSMENT — PAIN SCALES - GENERAL: PAINLEVEL: NO PAIN (0)

## 2019-10-11 ASSESSMENT — MIFFLIN-ST. JEOR: SCORE: 1959.12

## 2019-10-11 NOTE — PROGRESS NOTES
Subjective     Casey Pedro is a 38 year old male who presents to clinic today for the following health issues:    HPI   Diabetes Follow-up      How often are you checking your blood sugar? One time daily    What time of day are you checking your blood sugars (select all that apply)?  In am before means one day and after dinner the next , rotating back and forth.    Have you had any blood sugars above 200?  No    Have you had any blood sugars below 70?  No    What symptoms do you notice when your blood sugar is low?  None    What concerns do you have today about your diabetes? None     Do you have any of these symptoms? (Select all that apply)  No numbness or tingling in feet.  No redness, sores or blisters on feet.  No complaints of excessive thirst.  No reports of blurry vision.  No significant changes to weight.     Have you had a diabetic eye exam in the last 12 months? No    BP Readings from Last 2 Encounters:   10/11/19 100/64   07/23/19 103/72     Hemoglobin A1C (%)   Date Value   07/23/2019 5.6   04/15/2019 8.3 (H)     LDL Cholesterol Calculated (mg/dL)   Date Value   05/01/2018     Cannot estimate LDL when triglyceride exceeds 400 mg/dL   11/15/2016 62       Diabetes Management Resources    Amount of exercise or physical activity: 6-7 days/week for an average of greater than 60 minutes    Problems taking medications regularly: No    Medication side effects: none    Diet: zone diet and watches yobanis    Casey is seen in follow up of diabetes mellitus, type 2.  His home blood glucose readings are reviewed.     Patient Active Problem List   Diagnosis     BPH (begnign prostatic hyperplasia)     Mental retardation     Autism     Annual [facility] Examination     Disruptive behavior disorder     Seasonal allergic rhinitis     Mild intermittent asthma without complication     Slow transit constipation     Type 2 diabetes mellitus without complication, without long-term current use of insulin (H)     History reviewed. No  pertinent surgical history.    Social History     Tobacco Use     Smoking status: Never Smoker     Smokeless tobacco: Never Used     Tobacco comment: no passive exposure   Substance Use Topics     Alcohol use: No     Family History   Problem Relation Age of Onset     Alcohol/Drug Father         alcoholism         Current Outpatient Medications   Medication Sig Dispense Refill     Alcohol Swabs PADS 1 Application every 4 hours as needed 180 each 11     aspirin (ASA) 81 MG tablet Take 1 tablet (81 mg) by mouth daily 90 tablet 3     atorvastatin (LIPITOR) 40 MG tablet Take 1 tablet (40 mg) by mouth daily 90 tablet 3     blood glucose (NO BRAND SPECIFIED) lancets standard Use to test blood sugar 2 times daily or as directed. 100 each 10     blood glucose (NO BRAND SPECIFIED) test strip Use to test blood sugar 2 times daily or as directed. 50 each 10     blood glucose monitoring (NO BRAND SPECIFIED) meter device kit Use to test blood sugar 2 times daily or as directed. 1 kit 0     cetirizine (ZYRTEC) 10 MG tablet TAKE ONE (1) TABLET BY MOUTH DAILY 90 tablet 1     Cholecalciferol (VITAMIN D) 2000 units tablet Take 2,000 Units by mouth daily 100 tablet 3     divalproex sodium delayed-release (DEPAKOTE) 500 MG DR tablet Take 2 tablets (1,000 mg) by mouth 2 times daily 60 tablet 0     fish oil-omega-3 fatty acids 1000 MG capsule TAKE TWO (2) CAPSULES BY MOUTH DAILY 100 capsule 7     fluticasone (FLONASE) 50 MCG/ACT nasal spray Spray 1 spray into both nostrils daily 16 g 1     guanFACINE (TENEX) 1 MG tablet Take 1 tablet by mouth twice a day Take one tablet by mouth every day at 8 am and one tablet by mouth every day at 5 pm 60 tablet 0     ipratropium - albuterol 0.5 mg/2.5 mg/3 mL (DUONEB) 0.5-2.5 (3) MG/3ML neb solution Take 1 vial (3 mLs) by nebulization every 4 hours as needed for shortness of breath / dyspnea or wheezing 1 Box 0     lisinopril (PRINIVIL/ZESTRIL) 5 MG tablet Take 1 tablet (5 mg) by mouth daily 90 tablet  3     LORazepam (ATIVAN) 1 MG tablet Take 1 tablet (1 mg) by mouth 2 times daily 60 tablet 0     metFORMIN (GLUCOPHAGE-XR) 500 MG 24 hr tablet Take 1 tablet (500 mg) by mouth daily (with dinner) 60 tablet 3     OLANZapine (ZYPREXA) 10 MG tablet Take 1 tablet by mouth once a day Take one tablet by mouth every day at 7 am 30 tablet 5     OLANZapine (ZYPREXA) 20 MG tablet Take 20 mg by mouth daily Take with the 10mg.       OLANZAPINE PO Take 5 mg by mouth daily 11:00am       omeprazole (PRILOSEC) 40 MG DR capsule Take 1 capsule (40 mg) by mouth two times daily 60 capsule 9     polyethylene glycol (MIRALAX/GLYCOLAX) powder Take 17 g (1 capful) by mouth daily 510 g 5     risperiDONE (RISPERDAL) 0.25 MG tablet Take 0.25 mg by mouth 2 times daily  4     SENEXON-S 8.6-50 MG tablet Take 1 tablet by mouth 2 times daily 60 tablet 2     VITAMIN D, CHOLECALCIFEROL, PO Take 50,000 Units by mouth every 7 days       No Known Allergies  Recent Labs   Lab Test 07/23/19  1047 05/20/19  0809 04/15/19  1619 09/07/18  0847  05/01/18  1415 01/09/18  1146  05/08/17  0820 11/15/16  0830  05/18/16  0820   A1C 5.6  --  8.3*  --   --   --   --   --  5.0 4.7  --  4.8   LDL  --   --   --   --   --  Cannot estimate LDL when triglyceride exceeds 400 mg/dL  --   --   --  62  --  63   HDL  --   --   --   --   --  28*  --   --   --  35*  --  39*   TRIG  --   --   --   --   --  497*  --   --   --  191*  --  151*   ALT  --  88* 112* 110*   < >  --   --   --  93*  --   --   --    CR  --   --  0.63*  --   --   --  0.62*   < > 0.79  --    < >  --    GFRESTIMATED  --   --  >90  --   --   --  >90   < > >90  Non  GFR Calc    --    < >  --    GFRESTBLACK  --   --  >90  --   --   --  >90   < > >90  African American GFR Calc    --    < >  --    POTASSIUM  --   --  3.7  --   --   --  4.2   < > 4.4  --    < >  --    TSH  --   --  2.65  --   --   --  1.80  --  1.53  --   --   --     < > = values in this interval not displayed.      BP Readings from  "Last 3 Encounters:   10/11/19 100/64   07/23/19 103/72   05/13/19 112/69    Wt Readings from Last 3 Encounters:   10/11/19 103.9 kg (229 lb)   07/23/19 104.8 kg (231 lb)   05/13/19 110.9 kg (244 lb 6.4 oz)                 Reviewed and updated as needed this visit by Provider         Review of Systems   ROS COMP: Constitutional, HEENT, cardiovascular, pulmonary, gi and gu systems are negative, except as otherwise noted.      Objective    /64 (BP Location: Left arm, Patient Position: Sitting, Cuff Size: Adult Regular)   Pulse 73   Temp 96.9  F (36.1  C) (Tympanic)   Resp 18   Ht 1.753 m (5' 9\")   Wt 103.9 kg (229 lb)   SpO2 97%   BMI 33.82 kg/m    Body mass index is 33.82 kg/m .  Physical Exam  Vitals signs and nursing note reviewed.   Constitutional:       Appearance: He is well-developed.   Neurological:      Mental Status: He is alert and oriented to person, place, and time.   Psychiatric:         Mood and Affect: Mood normal.         Behavior: Behavior normal.        Other exam not repeated    Diagnostic Test Results:  Labs reviewed in Epic        Assessment & Plan   Type 2 diabetes mellitus without complication, without long-term current use of insulin (H)  Fasting labs are reviewed.  Goal of hemoglobin A1C of less than 7 discussed.  Instructed in the importance of diet, exercise, and good glycemic control in prevention of secondary complications.    Continue same medication regimen. Repeat fasting glucose and hemoglobin A1C in 3 months.     Need for prophylactic vaccination and inoculation against influenza  Immunizations updated.  - PNEUMOCOCCAL VACCINE,ADULT,SQ OR IM  - VACCINE ADMINISTRATION, INITIAL  - INFLUENZA VACCINE IM > 6 MONTHS VALENT IIV4 [14785]  - ADMIN INFLUENZA (For MEDICARE Patients ONLY) []      BMI:   Estimated body mass index is 34.11 kg/m  as calculated from the following:    Height as of this encounter: 1.753 m (5' 9\").    Weight as of this encounter: 104.8 kg (231 lb). "   Weight management plan: Discussed healthy diet and exercise guidelines        See Patient Instructions    No follow-ups on file.    Berny Velasquez MD  [facility]

## 2019-10-17 DIAGNOSIS — J30.1 SEASONAL ALLERGIC RHINITIS DUE TO POLLEN: ICD-10-CM

## 2019-10-18 RX ORDER — FLUTICASONE PROPIONATE 50 MCG
1 SPRAY, SUSPENSION (ML) NASAL DAILY
Qty: 16 G | Refills: 1 | Status: SHIPPED | OUTPATIENT
Start: 2019-10-18 | End: 2020-03-23

## 2019-10-19 DIAGNOSIS — K21.9 GASTROESOPHAGEAL REFLUX DISEASE WITHOUT ESOPHAGITIS: ICD-10-CM

## 2019-10-19 DIAGNOSIS — E11.9 TYPE 2 DIABETES MELLITUS WITHOUT COMPLICATION, WITHOUT LONG-TERM CURRENT USE OF INSULIN (H): ICD-10-CM

## 2019-10-19 DIAGNOSIS — F91.9 DISRUPTIVE BEHAVIOR DISORDER: ICD-10-CM

## 2019-10-22 RX ORDER — OMEPRAZOLE 40 MG/1
40 CAPSULE, DELAYED RELEASE ORAL
Qty: 60 CAPSULE | Refills: 9 | Status: SHIPPED | OUTPATIENT
Start: 2019-10-22 | End: 2020-09-15

## 2019-10-22 NOTE — TELEPHONE ENCOUNTER
Metformin       Last Written Prescription Date:  4/16/19  Last Fill Quantity: 60,   # refills: 3  Last Office Visit: 10/11/19  Future Office visit:    Next 5 appointments (look out 90 days)    David 10, 2020  8:40 AM CST  (Arrive by 8:25 AM)  SHORT with Berny Velasquez MD  [facility] [place] ([facility] ) [address]  [place] [address]  [phone]           Routing refill request to provider for review/approval because:  Lipids due

## 2019-10-23 RX ORDER — METFORMIN HCL 500 MG
500 TABLET, EXTENDED RELEASE 24 HR ORAL
Qty: 60 TABLET | Refills: 3 | Status: SHIPPED | OUTPATIENT
Start: 2019-10-23 | End: 2020-07-15

## 2019-11-06 DIAGNOSIS — K59.01 SLOW TRANSIT CONSTIPATION: ICD-10-CM

## 2019-11-07 RX ORDER — DOCUSATE SODIUM 50MG AND SENNOSIDES 8.6MG 8.6; 5 MG/1; MG/1
TABLET, FILM COATED ORAL
Qty: 60 TABLET | Refills: 2 | Status: SHIPPED | OUTPATIENT
Start: 2019-11-07 | End: 2019-12-30

## 2019-12-12 DIAGNOSIS — E55.9 VITAMIN D DEFICIENCY: ICD-10-CM

## 2019-12-12 DIAGNOSIS — F91.9 DISRUPTIVE BEHAVIOR DISORDER: ICD-10-CM

## 2019-12-12 DIAGNOSIS — K21.9 GASTROESOPHAGEAL REFLUX DISEASE WITHOUT ESOPHAGITIS: ICD-10-CM

## 2019-12-12 RX ORDER — OMEPRAZOLE 40 MG/1
40 CAPSULE, DELAYED RELEASE ORAL
Qty: 60 CAPSULE | Refills: 9 | OUTPATIENT
Start: 2019-12-12

## 2019-12-12 NOTE — TELEPHONE ENCOUNTER
omeprazol      Last Written Prescription Date:  11-  Last Fill Quantity: 60,   # refills: 9  Last Office Visit: 7-  Future Office visit:    Next 5 appointments (look out 90 days)    David 10, 2020  8:40 AM CST  (Arrive by 8:25 AM)  SHORT with Berny Velasquez MD  [facility] [place] ([facility] [place] ) [address]  [place] [address]  [phone]

## 2019-12-13 NOTE — TELEPHONE ENCOUNTER
Vit D3      Last Written Prescription Date:  9/27/2019  Last Fill Quantity: 100,   # refills: 3  Last Office Visit: 10/11/2019  Future Office visit:    Next 5 appointments (look out 90 days)    David 10, 2020  8:40 AM CST  (Arrive by 8:25 AM)  SHORT with Berny Velasquez MD  [facility] [place] ([facility] [place] ) [address]  [place] [address]  [phone]

## 2019-12-16 RX ORDER — CHOLECALCIFEROL (VITAMIN D3) 50 MCG
TABLET ORAL
Qty: 100 TABLET | Refills: 3 | Status: SHIPPED | OUTPATIENT
Start: 2019-12-16 | End: 2020-08-21

## 2019-12-16 NOTE — TELEPHONE ENCOUNTER
Med is historical, please advise.    VITAMIN D, CHOLECALCIFEROL, PO      Last Written Prescription Date:    Last Fill Quantity: ,   # refills:   Last Office Visit: 10/11/19  Future Office visit:    Next 5 appointments (look out 90 days)    David 10, 2020  8:40 AM CST  (Arrive by 8:25 AM)  SHORT with Berny Velasquez MD  [facility] [place] ([facility] ) [address]  [place] [address]  [phone]           Routing refill request to provider for review/approval because:  Drug not on the [facility], [facility] or [facility] refill protocol or controlled substance

## 2019-12-27 DIAGNOSIS — K59.01 SLOW TRANSIT CONSTIPATION: ICD-10-CM

## 2019-12-30 RX ORDER — ASPIRIN 81 MG
TABLET, DELAYED RELEASE (ENTERIC COATED) ORAL
Qty: 60 TABLET | Refills: 1 | Status: SHIPPED | OUTPATIENT
Start: 2019-12-30 | End: 2020-04-06

## 2020-01-07 DIAGNOSIS — F91.9 DISRUPTIVE BEHAVIOR DISORDER: ICD-10-CM

## 2020-01-07 DIAGNOSIS — E11.9 TYPE 2 DIABETES MELLITUS WITHOUT COMPLICATION, WITHOUT LONG-TERM CURRENT USE OF INSULIN (H): ICD-10-CM

## 2020-01-07 NOTE — TELEPHONE ENCOUNTER
metformin  Last Written Prescription Date: 10/23/19  Last Fill Quantity: 60 # of Refills: 3  Last Office Visit: 10/11/19

## 2020-01-10 RX ORDER — METFORMIN HCL 500 MG
500 TABLET, EXTENDED RELEASE 24 HR ORAL
Qty: 90 TABLET | Refills: 3 | OUTPATIENT
Start: 2020-01-10

## 2020-01-20 ENCOUNTER — OFFICE VISIT (OUTPATIENT)
Dept: FAMILY MEDICINE | Facility: OTHER | Age: 40
End: 2020-01-20
Attending: FAMILY MEDICINE
Payer: MEDICARE

## 2020-01-20 VITALS
BODY MASS INDEX: 32.14 KG/M2 | SYSTOLIC BLOOD PRESSURE: 100 MMHG | HEART RATE: 82 BPM | TEMPERATURE: 97.5 F | DIASTOLIC BLOOD PRESSURE: 65 MMHG | WEIGHT: 217 LBS | RESPIRATION RATE: 19 BRPM | HEIGHT: 69 IN | OXYGEN SATURATION: 97 %

## 2020-01-20 DIAGNOSIS — E11.9 TYPE 2 DIABETES MELLITUS WITHOUT COMPLICATION, WITHOUT LONG-TERM CURRENT USE OF INSULIN (H): ICD-10-CM

## 2020-01-20 DIAGNOSIS — E11.9 TYPE 2 DIABETES MELLITUS WITHOUT COMPLICATION, WITHOUT LONG-TERM CURRENT USE OF INSULIN (H): Primary | ICD-10-CM

## 2020-01-20 LAB
ALBUMIN SERPL-MCNC: 4.1 G/DL (ref 3.4–5)
ALP SERPL-CCNC: 44 U/L (ref 40–150)
ALT SERPL W P-5'-P-CCNC: 47 U/L (ref 0–70)
ANION GAP SERPL CALCULATED.3IONS-SCNC: 5 MMOL/L (ref 3–14)
AST SERPL W P-5'-P-CCNC: 17 U/L (ref 0–45)
BASOPHILS # BLD AUTO: 0 10E9/L (ref 0–0.2)
BASOPHILS NFR BLD AUTO: 0.4 %
BILIRUB SERPL-MCNC: 0.6 MG/DL (ref 0.2–1.3)
BUN SERPL-MCNC: 13 MG/DL (ref 7–30)
CALCIUM SERPL-MCNC: 9 MG/DL (ref 8.5–10.1)
CHLORIDE SERPL-SCNC: 108 MMOL/L (ref 94–109)
CHOLEST SERPL-MCNC: 89 MG/DL
CO2 SERPL-SCNC: 29 MMOL/L (ref 20–32)
CREAT SERPL-MCNC: 0.73 MG/DL (ref 0.66–1.25)
CREAT UR-MCNC: 123 MG/DL
DIFFERENTIAL METHOD BLD: NORMAL
EOSINOPHIL # BLD AUTO: 0.1 10E9/L (ref 0–0.7)
EOSINOPHIL NFR BLD AUTO: 1.5 %
ERYTHROCYTE [DISTWIDTH] IN BLOOD BY AUTOMATED COUNT: 12.8 % (ref 10–15)
EST. AVERAGE GLUCOSE BLD GHB EST-MCNC: 108 MG/DL
GFR SERPL CREATININE-BSD FRML MDRD: >90 ML/MIN/{1.73_M2}
GLUCOSE SERPL-MCNC: 108 MG/DL (ref 70–99)
HBA1C MFR BLD: 5.4 % (ref 0–5.6)
HCT VFR BLD AUTO: 50.1 % (ref 40–53)
HDLC SERPL-MCNC: 39 MG/DL
HGB BLD-MCNC: 17.2 G/DL (ref 13.3–17.7)
IMM GRANULOCYTES # BLD: 0 10E9/L (ref 0–0.4)
IMM GRANULOCYTES NFR BLD: 0.3 %
LDLC SERPL CALC-MCNC: 26 MG/DL
LYMPHOCYTES # BLD AUTO: 2.3 10E9/L (ref 0.8–5.3)
LYMPHOCYTES NFR BLD AUTO: 31.4 %
MCH RBC QN AUTO: 29.9 PG (ref 26.5–33)
MCHC RBC AUTO-ENTMCNC: 34.3 G/DL (ref 31.5–36.5)
MCV RBC AUTO: 87 FL (ref 78–100)
MICROALBUMIN UR-MCNC: 7 MG/L
MICROALBUMIN/CREAT UR: 6.09 MG/G CR (ref 0–17)
MONOCYTES # BLD AUTO: 0.7 10E9/L (ref 0–1.3)
MONOCYTES NFR BLD AUTO: 10.1 %
NEUTROPHILS # BLD AUTO: 4.1 10E9/L (ref 1.6–8.3)
NEUTROPHILS NFR BLD AUTO: 56.3 %
NONHDLC SERPL-MCNC: 50 MG/DL
NRBC # BLD AUTO: 0 10*3/UL
NRBC BLD AUTO-RTO: 0 /100
PLATELET # BLD AUTO: 157 10E9/L (ref 150–450)
POTASSIUM SERPL-SCNC: 4.6 MMOL/L (ref 3.4–5.3)
PROT SERPL-MCNC: 7.8 G/DL (ref 6.8–8.8)
RBC # BLD AUTO: 5.76 10E12/L (ref 4.4–5.9)
SODIUM SERPL-SCNC: 142 MMOL/L (ref 133–144)
TRIGL SERPL-MCNC: 118 MG/DL
TSH SERPL DL<=0.005 MIU/L-ACNC: 1.66 MU/L (ref 0.4–4)
WBC # BLD AUTO: 7.2 10E9/L (ref 4–11)

## 2020-01-20 PROCEDURE — 83036 HEMOGLOBIN GLYCOSYLATED A1C: CPT | Mod: ZL | Performed by: FAMILY MEDICINE

## 2020-01-20 PROCEDURE — 84443 ASSAY THYROID STIM HORMONE: CPT | Mod: ZL | Performed by: FAMILY MEDICINE

## 2020-01-20 PROCEDURE — 36415 COLL VENOUS BLD VENIPUNCTURE: CPT | Mod: ZL | Performed by: FAMILY MEDICINE

## 2020-01-20 PROCEDURE — G0463 HOSPITAL OUTPT CLINIC VISIT: HCPCS

## 2020-01-20 PROCEDURE — 80061 LIPID PANEL: CPT | Mod: ZL | Performed by: FAMILY MEDICINE

## 2020-01-20 PROCEDURE — 85025 COMPLETE CBC W/AUTO DIFF WBC: CPT | Mod: ZL | Performed by: FAMILY MEDICINE

## 2020-01-20 PROCEDURE — 80053 COMPREHEN METABOLIC PANEL: CPT | Mod: ZL | Performed by: FAMILY MEDICINE

## 2020-01-20 PROCEDURE — 40000788 ZZHCL STATISTIC ESTIMATED AVERAGE GLUCOSE: Mod: ZL | Performed by: FAMILY MEDICINE

## 2020-01-20 PROCEDURE — 82043 UR ALBUMIN QUANTITATIVE: CPT | Mod: ZL | Performed by: FAMILY MEDICINE

## 2020-01-20 PROCEDURE — 99213 OFFICE O/P EST LOW 20 MIN: CPT | Performed by: FAMILY MEDICINE

## 2020-01-20 ASSESSMENT — ASTHMA QUESTIONNAIRES
QUESTION_5 LAST FOUR WEEKS HOW WOULD YOU RATE YOUR ASTHMA CONTROL: COMPLETELY CONTROLLED
QUESTION_1 LAST FOUR WEEKS HOW MUCH OF THE TIME DID YOUR ASTHMA KEEP YOU FROM GETTING AS MUCH DONE AT WORK, SCHOOL OR AT HOME: NONE OF THE TIME
QUESTION_2 LAST FOUR WEEKS HOW OFTEN HAVE YOU HAD SHORTNESS OF BREATH: NOT AT ALL
QUESTION_4 LAST FOUR WEEKS HOW OFTEN HAVE YOU USED YOUR RESCUE INHALER OR NEBULIZER MEDICATION (SUCH AS ALBUTEROL): NOT AT ALL
QUESTION_3 LAST FOUR WEEKS HOW OFTEN DID YOUR ASTHMA SYMPTOMS (WHEEZING, COUGHING, SHORTNESS OF BREATH, CHEST TIGHTNESS OR PAIN) WAKE YOU UP AT NIGHT OR EARLIER THAN USUAL IN THE MORNING: NOT AT ALL
ACT_TOTALSCORE: 25

## 2020-01-20 ASSESSMENT — PAIN SCALES - GENERAL: PAINLEVEL: MILD PAIN (2)

## 2020-01-20 ASSESSMENT — MIFFLIN-ST. JEOR: SCORE: 1899.69

## 2020-01-20 NOTE — PROGRESS NOTES
Subjective     Casey Pedro is a 38 year old male who presents to clinic today for the following health issues:    HPI   Diabetes Follow-up      How often are you checking your blood sugar? One time daily    What time of day are you checking your blood sugars (select all that apply)?  In am before means one day and after dinner the next , rotating back and forth.    Have you had any blood sugars above 200?  No    Have you had any blood sugars below 70?  No    What symptoms do you notice when your blood sugar is low?  None    What concerns do you have today about your diabetes? None     Do you have any of these symptoms? (Select all that apply)  No numbness or tingling in feet.  No redness, sores or blisters on feet.  No complaints of excessive thirst.  No reports of blurry vision.  No significant changes to weight.     Have you had a diabetic eye exam in the last 12 months? No    BP Readings from Last 2 Encounters:   01/20/20 100/65   10/11/19 100/64     Hemoglobin A1C (%)   Date Value   01/20/2020 5.4   07/23/2019 5.6     LDL Cholesterol Calculated (mg/dL)   Date Value   01/20/2020 26   05/01/2018     Cannot estimate LDL when triglyceride exceeds 400 mg/dL       Diabetes Management Resources    Amount of exercise or physical activity: 6-7 days/week for an average of greater than 60 minutes    Problems taking medications regularly: No    Medication side effects: none    Diet: zone diet and watches gladis Peña is seen in follow up of diabetes mellitus, type 2.  His home blood glucose readings are reviewed.     Patient Active Problem List   Diagnosis     BPH (begnign prostatic hyperplasia)     Mental retardation     Autism     Annual [facility] Examination     Disruptive behavior disorder     Seasonal allergic rhinitis     Mild intermittent asthma without complication     Slow transit constipation     Type 2 diabetes mellitus without complication, without long-term current use of insulin (H)     History reviewed. No  pertinent surgical history.    Social History     Tobacco Use     Smoking status: Never Smoker     Smokeless tobacco: Never Used     Tobacco comment: no passive exposure   Substance Use Topics     Alcohol use: No     Family History   Problem Relation Age of Onset     Alcohol/Drug Father         alcoholism         Current Outpatient Medications   Medication Sig Dispense Refill     Alcohol Swabs PADS 1 Application every 4 hours as needed 180 each 11     aspirin (ASA) 81 MG tablet Take 1 tablet (81 mg) by mouth daily 90 tablet 3     atorvastatin (LIPITOR) 40 MG tablet Take 1 tablet (40 mg) by mouth daily 90 tablet 3     blood glucose (NO BRAND SPECIFIED) lancets standard Use to test blood sugar 2 times daily or as directed. 100 each 10     blood glucose (NO BRAND SPECIFIED) test strip Use to test blood sugar 2 times daily or as directed. 50 each 10     blood glucose monitoring (NO BRAND SPECIFIED) meter device kit Use to test blood sugar 2 times daily or as directed. 1 kit 0     cetirizine (ZYRTEC) 10 MG tablet TAKE ONE (1) TABLET BY MOUTH DAILY 90 tablet 1     divalproex sodium delayed-release (DEPAKOTE) 500 MG DR tablet Take 2 tablets (1,000 mg) by mouth 2 times daily 60 tablet 0     fish oil-omega-3 fatty acids 1000 MG capsule TAKE TWO (2) CAPSULES BY MOUTH DAILY 100 capsule 7     fluticasone (FLONASE) 50 MCG/ACT nasal spray Spray 1 spray into both nostrils daily 16 g 1     GAVILAX powder Take 17 g (1 capful) by mouth daily 510 g 5     guanFACINE (TENEX) 1 MG tablet Take 1 tablet by mouth twice a day Take one tablet by mouth every day at 8 am and one tablet by mouth every day at 5 pm 60 tablet 0     ipratropium - albuterol 0.5 mg/2.5 mg/3 mL (DUONEB) 0.5-2.5 (3) MG/3ML neb solution Take 1 vial (3 mLs) by nebulization every 4 hours as needed for shortness of breath / dyspnea or wheezing 1 Box 0     lisinopril (PRINIVIL/ZESTRIL) 5 MG tablet Take 1 tablet (5 mg) by mouth daily 90 tablet 3     LORazepam (ATIVAN) 1 MG  tablet Take 1 tablet (1 mg) by mouth 2 times daily 60 tablet 0     metFORMIN (GLUCOPHAGE-XR) 500 MG 24 hr tablet Take 1 tablet (500 mg) by mouth daily (with dinner) 60 tablet 3     OLANZapine (ZYPREXA) 10 MG tablet Take 1 tablet by mouth once a day Take one tablet by mouth every day at 7 am 30 tablet 5     OLANZapine (ZYPREXA) 20 MG tablet Take 20 mg by mouth daily Take with the 10mg.       OLANZAPINE PO Take 5 mg by mouth daily 11:00am       omeprazole (PRILOSEC) 40 MG DR capsule Take 1 capsule (40 mg) by mouth two times daily 60 capsule 9     risperiDONE (RISPERDAL) 0.25 MG tablet Take 0.25 mg by mouth 2 times daily  4     SENNA PLUS 8.6-50 MG tablet Take 1 tablet by mouth 2 times daily 60 tablet 1     VITAMIN D, CHOLECALCIFEROL, PO Take 50,000 Units by mouth every 7 days       vitamin D3 (CHOLECALCIFEROL) 2000 units (50 mcg) tablet TAKE ONE TABLET BY MOUTH EVERY DAY (2000UNITS) 100 tablet 3     No Known Allergies  Recent Labs   Lab Test 01/20/20  0907 07/23/19  1047 05/20/19  0809 04/15/19  1619  05/01/18  1415  11/15/16  0830   A1C 5.4 5.6  --  8.3*  --   --    < > 4.7   LDL 26  --   --   --   --  Cannot estimate LDL when triglyceride exceeds 400 mg/dL  --  62   HDL 39*  --   --   --   --  28*  --  35*   TRIG 118  --   --   --   --  497*  --  191*   ALT 47  --  88* 112*   < >  --    < >  --    CR 0.73  --   --  0.63*  --   --    < >  --    GFRESTIMATED >90  --   --  >90  --   --    < >  --    GFRESTBLACK >90  --   --  >90  --   --    < >  --    POTASSIUM 4.6  --   --  3.7  --   --    < >  --    TSH 1.66  --   --  2.65  --   --    < >  --     < > = values in this interval not displayed.      BP Readings from Last 3 Encounters:   01/20/20 100/65   10/11/19 100/64   07/23/19 103/72    Wt Readings from Last 3 Encounters:   01/20/20 98.4 kg (217 lb)   10/11/19 103.9 kg (229 lb)   07/23/19 104.8 kg (231 lb)                 Reviewed and updated as needed this visit by Provider         Review of Systems   ROS COMP:  "Constitutional, HEENT, cardiovascular, pulmonary, gi and gu systems are negative, except as otherwise noted.      Objective    /65 (BP Location: Left arm, Patient Position: Sitting, Cuff Size: Adult Regular)   Pulse 82   Temp 97.5  F (36.4  C) (Tympanic)   Resp 19   Ht 1.753 m (5' 9\")   Wt 98.4 kg (217 lb)   SpO2 97%   BMI 32.05 kg/m    Body mass index is 32.05 kg/m .  Physical Exam  Vitals signs and nursing note reviewed.   Constitutional:       Appearance: He is well-developed.   Neurological:      Mental Status: He is alert and oriented to person, place, and time.   Psychiatric:         Mood and Affect: Mood normal.         Behavior: Behavior normal.        Other exam not repeated    Diagnostic Test Results:  Labs reviewed in Epic        Assessment & Plan   Type 2 diabetes mellitus without complication, without long-term current use of insulin (H)  Fasting labs are reviewed.  Goal of hemoglobin A1C of less than 7 discussed.  Instructed in the importance of diet, exercise, and good glycemic control in prevention of secondary complications.    Continue same medication regimen. Repeat fasting glucose and hemoglobin A1C in 6 months.       BMI:   Estimated body mass index is 34.11 kg/m  as calculated from the following:    Height as of this encounter: 1.753 m (5' 9\").    Weight as of this encounter: 104.8 kg (231 lb).   Weight management plan: Discussed healthy diet and exercise guidelines        See Patient Instructions    No follow-ups on file.    Berny Velasquez MD  [facility] [place]      "

## 2020-01-20 NOTE — NURSING NOTE
"Chief Complaint   Patient presents with     Diabetes       Initial /65 (BP Location: Left arm, Patient Position: Sitting, Cuff Size: Adult Regular)   Pulse 82   Temp 97.5  F (36.4  C) (Tympanic)   Resp 19   Ht 1.753 m (5' 9\")   Wt 98.4 kg (217 lb)   SpO2 97%   BMI 32.05 kg/m   Estimated body mass index is 32.05 kg/m  as calculated from the following:    Height as of this encounter: 1.753 m (5' 9\").    Weight as of this encounter: 98.4 kg (217 lb).  Medication Reconciliation: complete  Rica Dillon LPN  "

## 2020-01-21 ASSESSMENT — ASTHMA QUESTIONNAIRES: ACT_TOTALSCORE: 25

## 2020-03-10 DIAGNOSIS — E11.9 TYPE 2 DIABETES MELLITUS WITHOUT COMPLICATION, WITHOUT LONG-TERM CURRENT USE OF INSULIN (H): ICD-10-CM

## 2020-03-10 RX ORDER — ASPIRIN 81 MG/1
TABLET, COATED ORAL
Qty: 90 TABLET | Refills: 3 | OUTPATIENT
Start: 2020-03-10

## 2020-03-13 NOTE — NURSING NOTE
"Chief Complaint   Patient presents with     Diabetes       Initial /72 (BP Location: Left arm, Patient Position: Sitting, Cuff Size: Adult Regular)   Pulse 67   Temp 99.3  F (37.4  C) (Tympanic)   Resp 18   Ht 1.753 m (5' 9\")   Wt 104.8 kg (231 lb)   SpO2 98%   BMI 34.11 kg/m   Estimated body mass index is 34.11 kg/m  as calculated from the following:    Height as of this encounter: 1.753 m (5' 9\").    Weight as of this encounter: 104.8 kg (231 lb).  Medication Reconciliation: complete   Rica Dillon    " flu-like symptoms

## 2020-03-23 DIAGNOSIS — J30.1 SEASONAL ALLERGIC RHINITIS DUE TO POLLEN: ICD-10-CM

## 2020-03-23 RX ORDER — FLUTICASONE PROPIONATE 50 MCG
1 SPRAY, SUSPENSION (ML) NASAL DAILY
Qty: 16 G | Refills: 1 | Status: SHIPPED | OUTPATIENT
Start: 2020-03-23 | End: 2020-06-09

## 2020-03-23 NOTE — TELEPHONE ENCOUNTER
Flonase  Last Written Prescription Date: 10/18/19  Last Fill Quantity: 16 g # of Refills: 1  Last Office Visit: 1/20/20

## 2020-04-06 DIAGNOSIS — K59.01 SLOW TRANSIT CONSTIPATION: ICD-10-CM

## 2020-04-06 RX ORDER — SENNA AND DOCUSATE SODIUM 50; 8.6 MG/1; MG/1
TABLET, FILM COATED ORAL
Qty: 100 TABLET | Refills: 1 | Status: SHIPPED | OUTPATIENT
Start: 2020-04-06 | End: 2020-07-07

## 2020-04-06 NOTE — TELEPHONE ENCOUNTER
jose      Last Written Prescription Date:  12/30/2019  Last Fill Quantity: 60,   # refills: 1  Last Office Visit: 01/20/2019

## 2020-04-16 DIAGNOSIS — J30.2 CHRONIC SEASONAL ALLERGIC RHINITIS: ICD-10-CM

## 2020-04-16 RX ORDER — CETIRIZINE HYDROCHLORIDE 10 MG/1
TABLET ORAL
Qty: 90 TABLET | Refills: 0 | Status: SHIPPED | OUTPATIENT
Start: 2020-04-16 | End: 2020-07-15

## 2020-05-16 DIAGNOSIS — E11.9 TYPE 2 DIABETES MELLITUS WITHOUT COMPLICATION, WITHOUT LONG-TERM CURRENT USE OF INSULIN (H): ICD-10-CM

## 2020-05-19 RX ORDER — LANCETS
EACH MISCELLANEOUS
Qty: 50 EACH | Refills: 10 | Status: SHIPPED | OUTPATIENT
Start: 2020-05-19 | End: 2021-06-03

## 2020-05-19 NOTE — TELEPHONE ENCOUNTER
Micro lancets      Last Written Prescription Date:    Last Fill Quantity: ,   # refills:   Last Office Visit: 1/20/20  Future Office visit:    Next 5 appointments (look out 90 days)    Jul 20, 2020  9:20 AM CDT  (Arrive by 9:05 AM)  SHORT with Berny Velasquez MD  [facility] [place] ([facility] [place] ) [address]  [place] [address]  [phone]           Routing refill request to provider for review/approval because:  Drug not active on patient's medication list

## 2020-06-09 ENCOUNTER — TRANSFERRED RECORDS (OUTPATIENT)
Dept: HEALTH INFORMATION MANAGEMENT | Facility: CLINIC | Age: 40
End: 2020-06-09

## 2020-06-09 DIAGNOSIS — J30.1 SEASONAL ALLERGIC RHINITIS DUE TO POLLEN: ICD-10-CM

## 2020-06-09 DIAGNOSIS — E11.9 TYPE 2 DIABETES MELLITUS WITHOUT COMPLICATION, WITHOUT LONG-TERM CURRENT USE OF INSULIN (H): ICD-10-CM

## 2020-06-09 LAB — RETINOPATHY: NORMAL

## 2020-06-09 RX ORDER — FLUTICASONE PROPIONATE 50 MCG
1 SPRAY, SUSPENSION (ML) NASAL DAILY
Qty: 16 G | Refills: 0 | Status: SHIPPED | OUTPATIENT
Start: 2020-06-09 | End: 2020-07-10

## 2020-06-09 RX ORDER — ATORVASTATIN CALCIUM 40 MG/1
40 TABLET, FILM COATED ORAL DAILY
Qty: 90 TABLET | Refills: 0 | Status: SHIPPED | OUTPATIENT
Start: 2020-06-09 | End: 2020-09-15

## 2020-06-09 RX ORDER — LISINOPRIL 5 MG/1
5 TABLET ORAL DAILY
Qty: 90 TABLET | Refills: 0 | Status: SHIPPED | OUTPATIENT
Start: 2020-06-09 | End: 2020-09-15

## 2020-06-15 DIAGNOSIS — E11.9 TYPE 2 DIABETES MELLITUS WITHOUT COMPLICATION, WITHOUT LONG-TERM CURRENT USE OF INSULIN (H): ICD-10-CM

## 2020-06-15 DIAGNOSIS — Z00.00 HEALTH CARE MAINTENANCE: ICD-10-CM

## 2020-06-15 NOTE — TELEPHONE ENCOUNTER
ASA 81 mg      Last Written Prescription Date:  7/23/2019  Last Fill Quantity: 90,   # refills: 3  Last Office Visit: 1/20/2020    Fish oil omega 3      Last Written Prescription Date:  6/24/2019  Last Fill Quantity: 100,   # refills: 7  Last Office Visit: 1/20/2020

## 2020-06-16 RX ORDER — CHLORAL HYDRATE 500 MG
CAPSULE ORAL
Qty: 100 CAPSULE | Refills: 7 | Status: SHIPPED | OUTPATIENT
Start: 2020-06-16 | End: 2020-08-10

## 2020-06-16 RX ORDER — ASPIRIN 81 MG/1
TABLET, COATED ORAL
Qty: 90 TABLET | Refills: 3 | Status: SHIPPED | OUTPATIENT
Start: 2020-06-16 | End: 2021-02-01

## 2020-07-03 DIAGNOSIS — K59.01 SLOW TRANSIT CONSTIPATION: ICD-10-CM

## 2020-07-06 NOTE — TELEPHONE ENCOUNTER
Steve      Last Written Prescription Date:  04.06.2020  Last Fill Quantity: 100,   # refills: 1  Last Office Visit: 01.20.2020

## 2020-07-07 RX ORDER — SENNA AND DOCUSATE SODIUM 50; 8.6 MG/1; MG/1
TABLET, FILM COATED ORAL
Qty: 100 TABLET | Refills: 1 | Status: SHIPPED | OUTPATIENT
Start: 2020-07-07 | End: 2020-10-06

## 2020-07-09 DIAGNOSIS — J30.1 SEASONAL ALLERGIC RHINITIS DUE TO POLLEN: ICD-10-CM

## 2020-07-09 NOTE — TELEPHONE ENCOUNTER
Flonase      Last Written Prescription Date:  6.9.2020  Last Fill Quantity: 16g,   # refills: 0  Last Office Visit: 01.20.2020

## 2020-07-10 RX ORDER — FLUTICASONE PROPIONATE 50 MCG
1 SPRAY, SUSPENSION (ML) NASAL DAILY
Qty: 16 G | Refills: 0 | Status: SHIPPED | OUTPATIENT
Start: 2020-07-10 | End: 2021-01-26

## 2020-07-14 DIAGNOSIS — J30.2 CHRONIC SEASONAL ALLERGIC RHINITIS: ICD-10-CM

## 2020-07-14 DIAGNOSIS — K59.01 SLOW TRANSIT CONSTIPATION: ICD-10-CM

## 2020-07-14 DIAGNOSIS — E11.9 TYPE 2 DIABETES MELLITUS WITHOUT COMPLICATION, WITHOUT LONG-TERM CURRENT USE OF INSULIN (H): ICD-10-CM

## 2020-07-15 RX ORDER — METFORMIN HCL 500 MG
500 TABLET, EXTENDED RELEASE 24 HR ORAL
Qty: 60 TABLET | Refills: 2 | Status: SHIPPED | OUTPATIENT
Start: 2020-07-15 | End: 2021-01-14

## 2020-07-15 RX ORDER — POLYETHYLENE GLYCOL 3350 17 G/17G
POWDER, FOR SOLUTION ORAL
Qty: 510 G | Refills: 1 | Status: SHIPPED | OUTPATIENT
Start: 2020-07-15 | End: 2020-09-15

## 2020-07-15 RX ORDER — CETIRIZINE HYDROCHLORIDE 10 MG/1
TABLET ORAL
Qty: 90 TABLET | Refills: 0 | Status: SHIPPED | OUTPATIENT
Start: 2020-07-15 | End: 2020-11-13

## 2020-07-20 ENCOUNTER — OFFICE VISIT (OUTPATIENT)
Dept: FAMILY MEDICINE | Facility: OTHER | Age: 40
End: 2020-07-20
Attending: FAMILY MEDICINE
Payer: MEDICARE

## 2020-07-20 ENCOUNTER — APPOINTMENT (OUTPATIENT)
Dept: LAB | Facility: OTHER | Age: 40
End: 2020-07-20
Attending: FAMILY MEDICINE
Payer: MEDICARE

## 2020-07-20 VITALS
DIASTOLIC BLOOD PRESSURE: 58 MMHG | RESPIRATION RATE: 18 BRPM | SYSTOLIC BLOOD PRESSURE: 100 MMHG | TEMPERATURE: 96.5 F | HEIGHT: 72 IN | WEIGHT: 209 LBS | OXYGEN SATURATION: 98 % | BODY MASS INDEX: 28.31 KG/M2 | HEART RATE: 77 BPM

## 2020-07-20 DIAGNOSIS — E11.9 TYPE 2 DIABETES MELLITUS WITHOUT COMPLICATION, WITHOUT LONG-TERM CURRENT USE OF INSULIN (H): Primary | ICD-10-CM

## 2020-07-20 LAB
EST. AVERAGE GLUCOSE BLD GHB EST-MCNC: 105 MG/DL
HBA1C MFR BLD: 5.3 % (ref 0–5.6)

## 2020-07-20 PROCEDURE — 83036 HEMOGLOBIN GLYCOSYLATED A1C: CPT | Mod: ZL | Performed by: FAMILY MEDICINE

## 2020-07-20 PROCEDURE — G0463 HOSPITAL OUTPT CLINIC VISIT: HCPCS

## 2020-07-20 PROCEDURE — 40000788 ZZHCL STATISTIC ESTIMATED AVERAGE GLUCOSE: Mod: ZL | Performed by: FAMILY MEDICINE

## 2020-07-20 PROCEDURE — 99213 OFFICE O/P EST LOW 20 MIN: CPT | Performed by: FAMILY MEDICINE

## 2020-07-20 PROCEDURE — 36415 COLL VENOUS BLD VENIPUNCTURE: CPT | Mod: ZL | Performed by: FAMILY MEDICINE

## 2020-07-20 ASSESSMENT — ANXIETY QUESTIONNAIRES
2. NOT BEING ABLE TO STOP OR CONTROL WORRYING: NOT AT ALL
GAD7 TOTAL SCORE: 1
6. BECOMING EASILY ANNOYED OR IRRITABLE: SEVERAL DAYS
3. WORRYING TOO MUCH ABOUT DIFFERENT THINGS: NOT AT ALL
7. FEELING AFRAID AS IF SOMETHING AWFUL MIGHT HAPPEN: NOT AT ALL
5. BEING SO RESTLESS THAT IT IS HARD TO SIT STILL: NOT AT ALL
IF YOU CHECKED OFF ANY PROBLEMS ON THIS QUESTIONNAIRE, HOW DIFFICULT HAVE THESE PROBLEMS MADE IT FOR YOU TO DO YOUR WORK, TAKE CARE OF THINGS AT HOME, OR GET ALONG WITH OTHER PEOPLE: NOT DIFFICULT AT ALL
1. FEELING NERVOUS, ANXIOUS, OR ON EDGE: NOT AT ALL
4. TROUBLE RELAXING: NOT AT ALL

## 2020-07-20 ASSESSMENT — PAIN SCALES - GENERAL: PAINLEVEL: NO PAIN (0)

## 2020-07-20 ASSESSMENT — PATIENT HEALTH QUESTIONNAIRE - PHQ9: SUM OF ALL RESPONSES TO PHQ QUESTIONS 1-9: 0

## 2020-07-20 ASSESSMENT — MIFFLIN-ST. JEOR: SCORE: 1903.08

## 2020-07-20 NOTE — PROGRESS NOTES
Subjective     Casey Pedro is a 38 year old male who presents to clinic today for the following health issues:    HPI   Diabetes Follow-up      How often are you checking your blood sugar? One time daily    What time of day are you checking your blood sugars (select all that apply)?  In am before means one day and after dinner the next , rotating back and forth.    Have you had any blood sugars above 200?  No    Have you had any blood sugars below 70?  No    What symptoms do you notice when your blood sugar is low?  None    What concerns do you have today about your diabetes? None     Do you have any of these symptoms? (Select all that apply)  No numbness or tingling in feet.  No redness, sores or blisters on feet.  No complaints of excessive thirst.  No reports of blurry vision.  No significant changes to weight.     Have you had a diabetic eye exam in the last 12 months? No    BP Readings from Last 2 Encounters:   07/20/20 100/58   01/20/20 100/65     Hemoglobin A1C (%)   Date Value   01/20/2020 5.4   07/23/2019 5.6     LDL Cholesterol Calculated (mg/dL)   Date Value   01/20/2020 26   05/01/2018     Cannot estimate LDL when triglyceride exceeds 400 mg/dL       Diabetes Management Resources    Amount of exercise or physical activity: 6-7 days/week for an average of greater than 60 minutes    Problems taking medications regularly: No    Medication side effects: none    Diet: zone diet and watches gladis Peña is seen in follow up of diabetes mellitus, type 2.  His home blood glucose readings are reviewed.     Patient Active Problem List   Diagnosis     BPH (begnign prostatic hyperplasia)     Mental retardation     Autism     Annual [facility] Examination     Disruptive behavior disorder     Seasonal allergic rhinitis     Mild intermittent asthma without complication     Slow transit constipation     Type 2 diabetes mellitus without complication, without long-term current use of insulin (H)     No past surgical history  on file.    Social History     Tobacco Use     Smoking status: Never Smoker     Smokeless tobacco: Never Used     Tobacco comment: no passive exposure   Substance Use Topics     Alcohol use: No     Family History   Problem Relation Age of Onset     Alcohol/Drug Father         alcoholism         Current Outpatient Medications   Medication Sig Dispense Refill     Alcohol Swabs PADS 1 Application every 4 hours as needed 180 each 11     ASPIRIN LOW DOSE 81 MG EC tablet Take 1 tablet (81 mg) by mouth daily 90 tablet 3     atorvastatin (LIPITOR) 40 MG tablet Take 1 tablet (40 mg) by mouth daily 90 tablet 0     blood glucose monitoring (NO BRAND SPECIFIED) meter device kit Use to test blood sugar 2 times daily or as directed. 1 kit 0     cetirizine (ZYRTEC) 10 MG tablet TAKE ONE (1) TABLET BY MOUTH DAILY 90 tablet 0     CONTOUR NEXT TEST test strip Use to test blood sugar 2 times daily or as directed. 50 strip 3     divalproex sodium delayed-release (DEPAKOTE) 500 MG DR tablet Take 2 tablets (1,000 mg) by mouth 2 times daily 60 tablet 0     fish oil-omega-3 fatty acids 1000 MG capsule TAKE TWO (2) CAPSULES BY MOUTH DAILY 100 capsule 7     fluticasone (FLONASE) 50 MCG/ACT nasal spray Spray 1 spray into both nostrils daily 16 g 0     GAVILAX 17 GM/SCOOP powder Take 17 g (1 capful) by mouth daily 510 g 1     guanFACINE (TENEX) 1 MG tablet Take 1 tablet by mouth twice a day Take one tablet by mouth every day at 8 am and one tablet by mouth every day at 5 pm 60 tablet 0     ipratropium - albuterol 0.5 mg/2.5 mg/3 mL (DUONEB) 0.5-2.5 (3) MG/3ML neb solution Take 1 vial (3 mLs) by nebulization every 4 hours as needed for shortness of breath / dyspnea or wheezing 1 Box 0     lisinopril (ZESTRIL) 5 MG tablet Take 1 tablet (5 mg) by mouth daily 90 tablet 0     LORazepam (ATIVAN) 1 MG tablet Take 1 tablet (1 mg) by mouth 2 times daily 60 tablet 0     metFORMIN (GLUCOPHAGE-XR) 500 MG 24 hr tablet Take 1 tablet (500 mg) by mouth daily  (with dinner) 60 tablet 2     Microlet Lancets MISC Use to test blood sugar 2 times daily or as directed. 50 each 10     OLANZapine (ZYPREXA) 10 MG tablet Take 1 tablet by mouth once a day Take one tablet by mouth every day at 7 am 30 tablet 5     OLANZapine (ZYPREXA) 20 MG tablet Take 20 mg by mouth daily Take with the 10mg.       OLANZAPINE PO Take 5 mg by mouth daily 11:00am       omeprazole (PRILOSEC) 40 MG DR capsule Take 1 capsule (40 mg) by mouth two times daily 60 capsule 9     risperiDONE (RISPERDAL) 0.25 MG tablet Take 0.25 mg by mouth 2 times daily  4     SENNA-docusate sodium (SENNA S) 8.6-50 MG tablet Take 1 tablet by mouth 2 times daily 100 tablet 1     VITAMIN D, CHOLECALCIFEROL, PO Take 50,000 Units by mouth every 7 days       vitamin D3 (CHOLECALCIFEROL) 2000 units (50 mcg) tablet TAKE ONE TABLET BY MOUTH EVERY DAY (2000UNITS) 100 tablet 3     No Known Allergies  Recent Labs   Lab Test 01/20/20  0907 07/23/19  1047 05/20/19  0809 04/15/19  1619  05/01/18  1415  11/15/16  0830   A1C 5.4 5.6  --  8.3*  --   --    < > 4.7   LDL 26  --   --   --   --  Cannot estimate LDL when triglyceride exceeds 400 mg/dL  --  62   HDL 39*  --   --   --   --  28*  --  35*   TRIG 118  --   --   --   --  497*  --  191*   ALT 47  --  88* 112*   < >  --    < >  --    CR 0.73  --   --  0.63*  --   --    < >  --    GFRESTIMATED >90  --   --  >90  --   --    < >  --    GFRESTBLACK >90  --   --  >90  --   --    < >  --    POTASSIUM 4.6  --   --  3.7  --   --    < >  --    TSH 1.66  --   --  2.65  --   --    < >  --     < > = values in this interval not displayed.      BP Readings from Last 3 Encounters:   07/20/20 100/58   01/20/20 100/65   10/11/19 100/64    Wt Readings from Last 3 Encounters:   07/20/20 94.8 kg (209 lb)   01/20/20 98.4 kg (217 lb)   10/11/19 103.9 kg (229 lb)                 Reviewed and updated as needed this visit by Provider         Review of Systems   ROS COMP: Constitutional, HEENT, cardiovascular,  "pulmonary, gi and gu systems are negative, except as otherwise noted.      Objective    /58 (BP Location: Left arm, Patient Position: Sitting, Cuff Size: Adult Regular)   Pulse 77   Temp 96.5  F (35.8  C) (Tympanic)   Resp 18   Ht 1.816 m (5' 11.5\")   Wt 94.8 kg (209 lb)   SpO2 98%   BMI 28.74 kg/m    Body mass index is 28.74 kg/m .  Physical Exam  Vitals signs and nursing note reviewed.   Constitutional:       Appearance: He is well-developed.   Neurological:      Mental Status: He is alert and oriented to person, place, and time.   Psychiatric:         Mood and Affect: Mood normal.         Behavior: Behavior normal.        Other exam not repeated    Diagnostic Test Results:  Labs reviewed in Epic        Assessment & Plan   Type 2 diabetes mellitus without complication, without long-term current use of insulin (H)  Fasting labs are reviewed.  Goal of hemoglobin A1C of less than 7 discussed.  Instructed in the importance of diet, exercise, and good glycemic control in prevention of secondary complications.    Continue same medication regimen. Repeat fasting glucose and hemoglobin A1C in 6 months.       BMI:   Estimated body mass index is 34.11 kg/m  as calculated from the following:    Height as of this encounter: 1.753 m (5' 9\").    Weight as of this encounter: 104.8 kg (231 lb).   Weight management plan: Discussed healthy diet and exercise guidelines        See Patient Instructions    No follow-ups on file.    Berny Velasquez MD  [facility] [place]      "

## 2020-07-20 NOTE — NURSING NOTE
"Chief Complaint   Patient presents with     Diabetes       Initial /58 (BP Location: Left arm, Patient Position: Sitting, Cuff Size: Adult Regular)   Pulse 77   Temp 96.5  F (35.8  C) (Tympanic)   Resp 18   Ht 1.816 m (5' 11.5\")   Wt 94.8 kg (209 lb)   SpO2 98%   BMI 28.74 kg/m   Estimated body mass index is 28.74 kg/m  as calculated from the following:    Height as of this encounter: 1.816 m (5' 11.5\").    Weight as of this encounter: 94.8 kg (209 lb).  Medication Reconciliation: complete  Rica Dillon LPN  "

## 2020-07-21 ASSESSMENT — ANXIETY QUESTIONNAIRES: GAD7 TOTAL SCORE: 1

## 2020-08-04 ENCOUNTER — TELEPHONE (OUTPATIENT)
Dept: FAMILY MEDICINE | Facility: OTHER | Age: 40
End: 2020-08-04

## 2020-08-04 NOTE — TELEPHONE ENCOUNTER
Merary Hollis at [facility] called does pt need to be fasting for labs for upcoming appt?  Next 5 appointments (look out 90 days)    Aug 26, 2020 11:00 AM CDT  (Arrive by 10:45 AM)  PHYSICAL with Berny Velasquez MD  [facility] [place] ([facility] [place] ) [address]  [place] [address]  [phone]        Call merary back at [phone]

## 2020-08-04 NOTE — TELEPHONE ENCOUNTER
Updated no fasting labs. States they are bringing him in a few days for med labs and wanted to know if he will need any labs. If so can you place these so they can get done all at once. Thank you

## 2020-08-06 DIAGNOSIS — Z79.899 ENCOUNTER FOR LONG-TERM (CURRENT) USE OF MEDICATIONS: Primary | ICD-10-CM

## 2020-08-06 LAB
ALBUMIN SERPL-MCNC: 4.1 G/DL (ref 3.4–5)
ALP SERPL-CCNC: 40 U/L (ref 40–150)
ALT SERPL W P-5'-P-CCNC: 38 U/L (ref 0–70)
AST SERPL W P-5'-P-CCNC: 12 U/L (ref 0–45)
BASOPHILS # BLD AUTO: 0 10E9/L (ref 0–0.2)
BASOPHILS NFR BLD AUTO: 0.7 %
BILIRUB DIRECT SERPL-MCNC: 0.2 MG/DL (ref 0–0.2)
BILIRUB SERPL-MCNC: 0.6 MG/DL (ref 0.2–1.3)
DIFFERENTIAL METHOD BLD: NORMAL
EOSINOPHIL # BLD AUTO: 0.1 10E9/L (ref 0–0.7)
EOSINOPHIL NFR BLD AUTO: 2.4 %
ERYTHROCYTE [DISTWIDTH] IN BLOOD BY AUTOMATED COUNT: 12.4 % (ref 10–15)
HCT VFR BLD AUTO: 46 % (ref 40–53)
HGB BLD-MCNC: 16.3 G/DL (ref 13.3–17.7)
IMM GRANULOCYTES # BLD: 0 10E9/L (ref 0–0.4)
IMM GRANULOCYTES NFR BLD: 0.2 %
LYMPHOCYTES # BLD AUTO: 2.5 10E9/L (ref 0.8–5.3)
LYMPHOCYTES NFR BLD AUTO: 46.3 %
MCH RBC QN AUTO: 30.4 PG (ref 26.5–33)
MCHC RBC AUTO-ENTMCNC: 35.4 G/DL (ref 31.5–36.5)
MCV RBC AUTO: 86 FL (ref 78–100)
MONOCYTES # BLD AUTO: 0.4 10E9/L (ref 0–1.3)
MONOCYTES NFR BLD AUTO: 6.5 %
NEUTROPHILS # BLD AUTO: 2.4 10E9/L (ref 1.6–8.3)
NEUTROPHILS NFR BLD AUTO: 43.9 %
NRBC # BLD AUTO: 0 10*3/UL
NRBC BLD AUTO-RTO: 0 /100
PLATELET # BLD AUTO: 150 10E9/L (ref 150–450)
PROT SERPL-MCNC: 7.7 G/DL (ref 6.8–8.8)
RBC # BLD AUTO: 5.36 10E12/L (ref 4.4–5.9)
VALPROATE SERPL-MCNC: 93 MG/L (ref 50–100)
WBC # BLD AUTO: 5.4 10E9/L (ref 4–11)

## 2020-08-06 PROCEDURE — 36415 COLL VENOUS BLD VENIPUNCTURE: CPT | Mod: ZL | Performed by: PSYCHIATRY & NEUROLOGY

## 2020-08-06 PROCEDURE — 80076 HEPATIC FUNCTION PANEL: CPT | Mod: ZL | Performed by: PSYCHIATRY & NEUROLOGY

## 2020-08-06 PROCEDURE — 80164 ASSAY DIPROPYLACETIC ACD TOT: CPT | Mod: ZL | Performed by: PSYCHIATRY & NEUROLOGY

## 2020-08-06 PROCEDURE — 85025 COMPLETE CBC W/AUTO DIFF WBC: CPT | Mod: ZL | Performed by: PSYCHIATRY & NEUROLOGY

## 2020-08-10 DIAGNOSIS — Z00.00 HEALTH CARE MAINTENANCE: ICD-10-CM

## 2020-08-10 RX ORDER — CHLORAL HYDRATE 500 MG
CAPSULE ORAL
Qty: 100 CAPSULE | Refills: 7 | Status: SHIPPED | OUTPATIENT
Start: 2020-08-10 | End: 2020-08-12

## 2020-08-10 NOTE — TELEPHONE ENCOUNTER
Fish oil- omega 3 fatty acids      Last Written Prescription Date:  6/16/20  Last Fill Quantity: 100,   # refills: 7  Last Office Visit: 7/20/20  Future Office visit:    Next 5 appointments (look out 90 days)    Aug 26, 2020 11:00 AM CDT  (Arrive by 10:45 AM)  PHYSICAL with Berny Velasquez MD  [facility] [place] ([facility] ) [address]  [place] [address]  [phone]           Routing refill request to provider for review/approval because:

## 2020-08-12 DIAGNOSIS — Z00.00 HEALTH CARE MAINTENANCE: ICD-10-CM

## 2020-08-12 RX ORDER — CHLORAL HYDRATE 500 MG
CAPSULE ORAL
Qty: 100 CAPSULE | Refills: 7 | Status: SHIPPED | OUTPATIENT
Start: 2020-08-12 | End: 2021-08-12

## 2020-08-12 NOTE — TELEPHONE ENCOUNTER
fish oil-omega-3 fatty acids 1000 MG capsule     Medication was refilled on 8/10/20 went as a LOCAL PRINT so it was not received by the pharmacy.     Medication has been pended with E Prescribe.

## 2020-08-21 DIAGNOSIS — E55.9 VITAMIN D DEFICIENCY: ICD-10-CM

## 2020-08-21 RX ORDER — CHOLECALCIFEROL (VITAMIN D3) 50 MCG
TABLET ORAL
Qty: 100 TABLET | Refills: 1 | Status: SHIPPED | OUTPATIENT
Start: 2020-08-21 | End: 2021-06-15

## 2020-08-28 ENCOUNTER — OFFICE VISIT (OUTPATIENT)
Dept: FAMILY MEDICINE | Facility: OTHER | Age: 40
End: 2020-08-28
Attending: FAMILY MEDICINE
Payer: MEDICARE

## 2020-08-28 VITALS
HEART RATE: 87 BPM | WEIGHT: 210 LBS | DIASTOLIC BLOOD PRESSURE: 60 MMHG | BODY MASS INDEX: 31.1 KG/M2 | HEIGHT: 69 IN | TEMPERATURE: 96.2 F | OXYGEN SATURATION: 97 % | SYSTOLIC BLOOD PRESSURE: 90 MMHG

## 2020-08-28 DIAGNOSIS — F70 MILD INTELLECTUAL DISABILITY: ICD-10-CM

## 2020-08-28 DIAGNOSIS — Z00.00 ROUTINE GENERAL MEDICAL EXAMINATION AT A HEALTH CARE FACILITY: Primary | ICD-10-CM

## 2020-08-28 DIAGNOSIS — E11.9 TYPE 2 DIABETES MELLITUS WITHOUT COMPLICATION, WITHOUT LONG-TERM CURRENT USE OF INSULIN (H): ICD-10-CM

## 2020-08-28 PROCEDURE — 99395 PREV VISIT EST AGE 18-39: CPT | Performed by: FAMILY MEDICINE

## 2020-08-28 ASSESSMENT — ASTHMA QUESTIONNAIRES
ACT_TOTALSCORE: 25
QUESTION_1 LAST FOUR WEEKS HOW MUCH OF THE TIME DID YOUR ASTHMA KEEP YOU FROM GETTING AS MUCH DONE AT WORK, SCHOOL OR AT HOME: NONE OF THE TIME
QUESTION_2 LAST FOUR WEEKS HOW OFTEN HAVE YOU HAD SHORTNESS OF BREATH: NOT AT ALL
QUESTION_3 LAST FOUR WEEKS HOW OFTEN DID YOUR ASTHMA SYMPTOMS (WHEEZING, COUGHING, SHORTNESS OF BREATH, CHEST TIGHTNESS OR PAIN) WAKE YOU UP AT NIGHT OR EARLIER THAN USUAL IN THE MORNING: NOT AT ALL
QUESTION_4 LAST FOUR WEEKS HOW OFTEN HAVE YOU USED YOUR RESCUE INHALER OR NEBULIZER MEDICATION (SUCH AS ALBUTEROL): NOT AT ALL
QUESTION_5 LAST FOUR WEEKS HOW WOULD YOU RATE YOUR ASTHMA CONTROL: COMPLETELY CONTROLLED

## 2020-08-28 ASSESSMENT — PAIN SCALES - GENERAL: PAINLEVEL: NO PAIN (0)

## 2020-08-28 ASSESSMENT — MIFFLIN-ST. JEOR: SCORE: 1863.96

## 2020-08-28 NOTE — LETTER
My Asthma Action Plan    Name: Casey Pedro   YOB: 1980  Date: 8/28/2020   My doctor: Berny Velasquez MD   [facility]        My Rescue Medicine:   Albuterol inhaler (Proair/Ventolin/Proventil HFA)  2-4 puffs EVERY 4 HOURS as needed. Use a spacer if recommended by your provider.   My Asthma Severity:   Intermittent / Exercise Induced  Know your asthma triggers: upper respiratory infections             GREEN ZONE   Good Control    I feel good    No cough or wheeze    Can work, sleep and play without asthma symptoms       Take your asthma control medicine every day.     1. If exercise triggers your asthma, take your rescue medication    15 minutes before exercise or sports, and    During exercise if you have asthma symptoms  2. Spacer to use with inhaler: If you have a spacer, make sure to use it with your inhaler             YELLOW ZONE Getting Worse  I have ANY of these:    I do not feel good    Cough or wheeze    Chest feels tight    Wake up at night   1. Keep taking your Green Zone medications  2. Start taking your rescue medicine:    every 20 minutes for up to 1 hour. Then every 4 hours for 24-48 hours.  3. If you stay in the Yellow Zone for more than 12-24 hours, contact your doctor.  4. If you do not return to the Green Zone in 12-24 hours or you get worse, start taking your oral steroid medicine if prescribed by your provider.           RED ZONE Medical Alert - Get Help  I have ANY of these:    I feel awful    Medicine is not helping    Breathing getting harder    Trouble walking or talking    Nose opens wide to breathe       1. Take your rescue medicine NOW  2. If your provider has prescribed an oral steroid medicine, start taking it NOW  3. Call your doctor NOW  4. If you are still in the Red Zone after 20 minutes and you have not reached your doctor:    Take your rescue medicine again and    Call 911 or go to the emergency room right away    See your  regular doctor within 2 weeks of an Emergency Room or Urgent Care visit for follow-up treatment.          Annual Reminders:  Meet with Asthma Educator,  Flu Shot in the Fall, consider Pneumonia Vaccination for patients with asthma (aged 19 and older).    Pharmacy:    [facility] [address] [facility] [address] [facility] [address] [place] [address]    Electronically signed by Berny Velasquez MD   Date: 08/28/20                    Asthma Triggers  How To Control Things That Make Your Asthma Worse    Triggers are things that make your asthma worse.  Look at the list below to help you find your triggers and   what you can do about them. You can help prevent asthma flare-ups by staying away from your triggers.      Trigger                                                          What you can do   Cigarette Smoke  Tobacco smoke can make asthma worse. Do not allow smoking in your home, car or around you.  Be sure no one smokes at a child s day care or school.  If you smoke, ask your health care provider for ways to help you quit.  Ask family members to quit too.  Ask your health care provider for a referral to Quit Plan to help you quit smoking, or call 2-303-724-PLAN.     Colds, Flu, Bronchitis  These are common triggers of asthma. Wash your hands often.  Don t touch your eyes, nose or mouth.  Get a flu shot every year.     Dust Mites  These are tiny bugs that live in cloth or carpet. They are too small to see. Wash sheets and blankets in hot water every week.   Encase pillows and mattress in dust mite proof covers.  Avoid having carpet if you can. If you have carpet, vacuum weekly.   Use a dust mask and HEPA vacuum.   Pollen and Outdoor Mold  Some people are allergic to trees, grass, or weed pollen, or molds. Try to keep your windows closed.  Limit time out doors when pollen count is high.   Ask you health care provider about taking medicine during allergy season.     Animal Dander  Some people  are allergic to skin flakes, urine or saliva from pets with fur or feathers. Keep pets with fur or feathers out of your home.    If you can t keep the pet outdoors, then keep the pet out of your bedroom.  Keep the bedroom door closed.  Keep pets off cloth furniture and away from stuffed toys.     Mice, Rats, and Cockroaches  Some people are allergic to the waste from these pests.   Cover food and garbage.  Clean up spills and food crumbs.  Store grease in the refrigerator.   Keep food out of the bedroom.   Indoor Mold  This can be a trigger if your home has high moisture. Fix leaking faucets, pipes, or other sources of water.   Clean moldy surfaces.  Dehumidify basement if it is damp and smelly.   Smoke, Strong Odors, and Sprays  These can reduce air quality. Stay away from strong odors and sprays, such as perfume, powder, hair spray, paints, smoke incense, paint, cleaning products, candles and new carpet.   Exercise or Sports  Some people with asthma have this trigger. Be active!  Ask your doctor about taking medicine before sports or exercise to prevent symptoms.    Warm up for 5-10 minutes before and after sports or exercise.     Other Triggers of Asthma  Cold air:  Cover your nose and mouth with a scarf.  Sometimes laughing or crying can be a trigger.  Some medicines and food can trigger asthma.

## 2020-08-28 NOTE — PROGRESS NOTES
"3  SUBJECTIVE:   CC: Casey Pedro is an 39 year old male who presents for preventive health visit.     Healthy Habits:    Do you get at least three servings of calcium containing foods daily (dairy, green leafy vegetables, etc.)? yes    Amount of exercise or daily activities, outside of work: daily day(s) per week    Problems taking medications regularly No    Medication side effects: No    Have you had an eye exam in the past two years? yes    Do you see a dentist twice per year? yes    Do you have sleep apnea, excessive snoring or daytime drowsiness?no      {additional problems to add (Optional):329619}    Today's PHQ-2 Score:   PHQ-2 ( 1999 Pfizer) 1/20/2020 11/15/2013   Q1: Little interest or pleasure in doing things 0 0   Q2: Feeling down, depressed or hopeless 0 0   PHQ-2 Score 0 0       Abuse: Current or Past(Physical, Sexual or Emotional)- No  Do you feel safe in your environment? Yes        Social History     Tobacco Use     Smoking status: Never Smoker     Smokeless tobacco: Never Used     Tobacco comment: no passive exposure   Substance Use Topics     Alcohol use: No     If you drink alcohol do you typically have >3 drinks per day or >7 drinks per week? Not Applicable                      Last PSA: No results found for: PSA    Reviewed orders with patient. Reviewed health maintenance and updated orders accordingly - {Yes/No:391549::\"Yes\"}  {Chronicprobdata (Optional):505626}    Reviewed and updated as needed this visit by clinical staff  Tobacco  Allergies  Meds  Med Hx  Surg Hx  Fam Hx  Soc Hx        Reviewed and updated as needed this visit by Provider        {HISTORY OPTIONS (Optional):028453}    ROS:  { :404600::\"CONSTITUTIONAL: NEGATIVE for fever, chills, change in weight\",\"INTEGUMENTARY/SKIN: NEGATIVE for worrisome rashes, moles or lesions\",\"EYES: NEGATIVE for vision changes or irritation\",\"ENT: NEGATIVE for ear, mouth and throat problems\",\"RESP: NEGATIVE for significant cough or SOB\",\"CV: " "NEGATIVE for chest pain, palpitations or peripheral edema\",\"GI: NEGATIVE for nausea, abdominal pain, heartburn, or change in bowel habits\",\" male: negative for dysuria, hematuria, decreased urinary stream, erectile dysfunction, urethral discharge\",\"MUSCULOSKELETAL: NEGATIVE for significant arthralgias or myalgia\",\"NEURO: NEGATIVE for weakness, dizziness or paresthesias\",\"PSYCHIATRIC: NEGATIVE for changes in mood or affect\"}    OBJECTIVE:   BP 90/60   Pulse 87   Temp 96.2  F (35.7  C)   Ht 1.746 m (5' 8.75\")   Wt 95.3 kg (210 lb)   SpO2 97%   BMI 31.24 kg/m    EXAM:  {Exam Choices:911134}    {Diagnostic Test Results (Optional):956921::\"Diagnostic Test Results:\",\"Labs reviewed in Epic\"}    ASSESSMENT/PLAN:   {Diag Picklist:092154}    COUNSELING:  {MALE COUNSELING MESSAGES:727145::\"Reviewed preventive health counseling, as reflected in patient instructions\"}    Estimated body mass index is 31.24 kg/m  as calculated from the following:    Height as of this encounter: 1.746 m (5' 8.75\").    Weight as of this encounter: 95.3 kg (210 lb).    {Weight Management Plan (ACO) Complete if BMI is abnormal-  Ages 18-64  BMI >24.9.  Age 65+ with BMI <23 or >30 (Optional):011199}    He reports that he has never smoked. He has never used smokeless tobacco.      Counseling Resources:  ATP IV Guidelines  Pooled Cohorts Equation Calculator  FRAX Risk Assessment  ICSI Preventive Guidelines  Dietary Guidelines for Americans, 2010  USDA's MyPlate  ASA Prophylaxis  Lung CA Screening    Berny Velasquez MD  [facility] [place]  "

## 2020-08-28 NOTE — PROGRESS NOTES
SUBJECTIVE:  Casey Pedro, 39 year old, male is seen for Annual [facility] Examination.  He generally feels well.     He was recently diagnosed with diabetes mellitus, type 2 and will be [facility]. He has been on dietary management..    Denies chest pain, dyspnea, decreased exercise tolerance, dizzyness, nausea, vomiting, diaphoresis, palpitations, numbness, tingling, or paresthesias.      Current Outpatient Medications   Medication Sig Dispense Refill     Alcohol Swabs PADS 1 Application every 4 hours as needed 180 each 11     ASPIRIN LOW DOSE 81 MG EC tablet Take 1 tablet (81 mg) by mouth daily 90 tablet 3     atorvastatin (LIPITOR) 40 MG tablet Take 1 tablet (40 mg) by mouth daily 90 tablet 0     blood glucose monitoring (NO BRAND SPECIFIED) meter device kit Use to test blood sugar 2 times daily or as directed. 1 kit 0     cetirizine (ZYRTEC) 10 MG tablet TAKE ONE (1) TABLET BY MOUTH DAILY 90 tablet 0     CONTOUR NEXT TEST test strip Use to test blood sugar 2 times daily or as directed. 50 strip 3     divalproex sodium delayed-release (DEPAKOTE) 500 MG DR tablet Take 2 tablets (1,000 mg) by mouth 2 times daily 60 tablet 0     fish oil-omega-3 fatty acids 1000 MG capsule TAKE TWO (2) CAPSULES BY MOUTH DAILY 100 capsule 7     fluticasone (FLONASE) 50 MCG/ACT nasal spray Spray 1 spray into both nostrils daily 16 g 0     GAVILAX 17 GM/SCOOP powder Take 17 g (1 capful) by mouth daily 510 g 1     guanFACINE (TENEX) 1 MG tablet Take 1 tablet by mouth twice a day Take one tablet by mouth every day at 8 am and one tablet by mouth every day at 5 pm 60 tablet 0     lisinopril (ZESTRIL) 5 MG tablet Take 1 tablet (5 mg) by mouth daily 90 tablet 0     LORazepam (ATIVAN) 1 MG tablet Take 1 tablet (1 mg) by mouth 2 times daily 60 tablet 0     metFORMIN (GLUCOPHAGE-XR) 500 MG 24 hr tablet Take 1 tablet (500 mg) by mouth daily (with dinner) 60 tablet 2     Microlet Lancets MISC Use to test blood sugar 2 times  daily or as directed. 50 each 10     OLANZapine (ZYPREXA) 10 MG tablet Take 1 tablet by mouth once a day Take one tablet by mouth every day at 7 am 30 tablet 5     OLANZapine (ZYPREXA) 20 MG tablet Take 20 mg by mouth daily Take with the 10mg.       OLANZAPINE PO Take 5 mg by mouth daily 11:00am       omeprazole (PRILOSEC) 40 MG DR capsule Take 1 capsule (40 mg) by mouth two times daily 60 capsule 9     risperiDONE (RISPERDAL) 0.25 MG tablet Take 0.25 mg by mouth 2 times daily  4     SENNA-docusate sodium (SENNA S) 8.6-50 MG tablet Take 1 tablet by mouth 2 times daily 100 tablet 1     vitamin D3 (CHOLECALCIFEROL) 50 mcg (2000 units) tablet TAKE ONE TABLET BY MOUTH EVERY DAY (2000UNITS) 100 tablet 1     ipratropium - albuterol 0.5 mg/2.5 mg/3 mL (DUONEB) 0.5-2.5 (3) MG/3ML neb solution Take 1 vial (3 mLs) by nebulization every 4 hours as needed for shortness of breath / dyspnea or wheezing (Patient not taking: Reported on 8/28/2020) 1 Box 0      No Known Allergies    Past Medical History:   Diagnosis Date     Autism 09/16/2011     BPH 09/16/2011     Diabetes mellitus, type 2 (H)      Mental retardation 09/16/2011     History reviewed. No pertinent surgical history.  Family History   Problem Relation Age of Onset     Alcohol/Drug Father         alcoholism     Social History     Socioeconomic History     Marital status: Single     Spouse name: Not on file     Number of children: Not on file     Years of education: Not on file     Highest education level: Not on file   Occupational History     Not on file   Social Needs     Financial resource strain: Not on file     Food insecurity     Worry: Not on file     Inability: Not on file     Transportation needs     Medical: Not on file     Non-medical: Not on file   Tobacco Use     Smoking status: Never Smoker     Smokeless tobacco: Never Used     Tobacco comment: no passive exposure   Substance and Sexual Activity     Alcohol use: No     Drug use: No     Sexual activity:  Never   Lifestyle     Physical activity     Days per week: Not on file     Minutes per session: Not on file     Stress: Not on file   Relationships     Social connections     Talks on phone: Not on file     Gets together: Not on file     Attends Jehovah's witness service: Not on file     Active member of club or organization: Not on file     Attends meetings of clubs or organizations: Not on file     Relationship status: Not on file     Intimate partner violence     Fear of current or ex partner: Not on file     Emotionally abused: Not on file     Physically abused: Not on file     Forced sexual activity: Not on file   Other Topics Concern      Service Not Asked     Blood Transfusions Not Asked     Caffeine Concern No     Occupational Exposure Not Asked     Hobby Hazards Not Asked     Sleep Concern Not Asked     Stress Concern Not Asked     Weight Concern Not Asked     Special Diet Not Asked     Back Care Not Asked     Exercise Yes     Comment: daily     Bike Helmet Not Asked     Seat Belt Yes     Self-Exams Not Asked     Parent/sibling w/ CABG, MI or angioplasty before 65F 55M? No     Comment: unknown   Social History Narrative     Not on file         Review Of Systems  Constitutional, neuro, ENT, endocrine, pulmonary, cardiac, gastrointestinal, genitourinary, musculoskeletal, integument and psychiatric systems are negative, except as otherwise noted by staff.    OBJECTIVE:  Vitals: B/P: 110/68, T: 97.7, P: 92, R: 18    Exam:  Physical Exam   Constitutional: He is oriented to person, place, and time. He appears well-developed and well-nourished. No distress.   HENT:   Head: Normocephalic and atraumatic.   Right Ear: External ear normal.   Left Ear: External ear normal.   Nose: Nose normal.   Mouth/Throat: Oropharynx is clear and moist.   Eyes: Pupils are equal, round, and reactive to light. Conjunctivae and EOM are normal.   Neck: Normal range of motion. Neck supple. No JVD present. No thyromegaly present.    Cardiovascular: Normal rate, regular rhythm, normal heart sounds and intact distal pulses. Exam reveals no gallop and no friction rub.   No murmur heard.  Pulmonary/Chest: Effort normal and breath sounds normal. He has no wheezes. He has no rales.   Abdominal: Soft. Bowel sounds are normal. He exhibits no mass. There is no abdominal tenderness.   Musculoskeletal: Normal range of motion.   Lymphadenopathy:     He has no cervical adenopathy.   Neurological: He is alert and oriented to person, place, and time. He has normal reflexes.   Skin: Skin is warm and dry.   Psychiatric: He has a normal mood and affect.         Labs:  Office Visit on 11/18/2015   Component Date Value Ref Range Status     WBC 11/18/2015 6.8  4.0 - 11.0 10e9/L Final     RBC Count 11/18/2015 5.46  4.4 - 5.9 10e12/L Final     Hemoglobin 11/18/2015 15.9  13.3 - 17.7 g/dL Final     Hematocrit 11/18/2015 45.7  40.0 - 53.0 % Final     MCV 11/18/2015 84  78 - 100 fl Final     MCH 11/18/2015 29.1  26.5 - 33.0 pg Final     MCHC 11/18/2015 34.8  31.5 - 36.5 g/dL Final     RDW 11/18/2015 13.3  10.0 - 15.0 % Final     Platelet Count 11/18/2015 213  150 - 450 10e9/L Final     Diff Method 11/18/2015 Automated Method   Final     % Neutrophils 11/18/2015 60.5   Final     % Lymphocytes 11/18/2015 24.1   Final     % Monocytes 11/18/2015 12.1   Final     % Eosinophils 11/18/2015 2.7   Final     % Basophils 11/18/2015 0.3   Final     % Immature Granulocytes 11/18/2015 0.3   Final     Absolute Neutrophil 11/18/2015 4.1  1.6 - 8.3 10e9/L Final     Absolute Lymphocytes 11/18/2015 1.6  0.8 - 5.3 10e9/L Final     Absolute Monoctyes 11/18/2015 0.8  0.0 - 1.3 10e9/L Final     Absolute Eosinophils 11/18/2015 0.2  0.0 - 0.7 10e9/L Final     Absolute Basophils 11/18/2015 0.0  0.0 - 0.2 10e9/L Final     Abs Immature Granulocytes 11/18/2015 0.0  0 - 0.4 10e9/L Final     Sodium 11/18/2015 139  133 - 144 mmol/L Final     Potassium 11/18/2015 4.2  3.4 - 5.3 mmol/L Final      Chloride 11/18/2015 108  94 - 109 mmol/L Final     Carbon Dioxide 11/18/2015 26  20 - 32 mmol/L Final     Anion Gap 11/18/2015 5  3 - 14 mmol/L Final     Glucose 11/18/2015 88  70 - 99 mg/dL Final     Urea Nitrogen 11/18/2015 11  7 - 30 mg/dL Final     Creatinine 11/18/2015 0.76  0.66 - 1.25 mg/dL Final     GFR Estimate 11/18/2015   >60 mL/min/1.7m2 Final                    Value:>90  Non  GFR Calc       GFR Estimate If Black 11/18/2015   >60 mL/min/1.7m2 Final                    Value:>90   GFR Calc       Calcium 11/18/2015 9.1  8.5 - 10.1 mg/dL Final     TSH 11/18/2015 0.68  0.40 - 4.00 mU/L Final       ASSESSMENT/PLAN:  Annual [facility] Examination  Discussed with staff.  Current medications, allergies, and plan of care reviewed. Nutritional and screening labs drawn.  Resident is free of communicable disease and does not need 24 hour professional nursing care.  Routine exam one year.        Mental retardation  Stable    Autism  Behaviors reviewed              Berny Velasquez MD

## 2020-08-28 NOTE — NURSING NOTE
"Chief Complaint   Patient presents with     Physical       Initial BP 90/60   Pulse 87   Temp 96.2  F (35.7  C)   Ht 1.746 m (5' 8.75\")   Wt 95.3 kg (210 lb)   SpO2 97%   BMI 31.24 kg/m   Estimated body mass index is 31.24 kg/m  as calculated from the following:    Height as of this encounter: 1.746 m (5' 8.75\").    Weight as of this encounter: 95.3 kg (210 lb).  Medication Reconciliation: complete  Rayshawn Patino LPN  "

## 2020-08-29 ASSESSMENT — ASTHMA QUESTIONNAIRES: ACT_TOTALSCORE: 25

## 2020-09-14 DIAGNOSIS — F91.9 DISRUPTIVE BEHAVIOR DISORDER: ICD-10-CM

## 2020-09-14 DIAGNOSIS — K21.9 GASTROESOPHAGEAL REFLUX DISEASE WITHOUT ESOPHAGITIS: ICD-10-CM

## 2020-09-14 DIAGNOSIS — E11.9 TYPE 2 DIABETES MELLITUS WITHOUT COMPLICATION, WITHOUT LONG-TERM CURRENT USE OF INSULIN (H): ICD-10-CM

## 2020-09-14 DIAGNOSIS — K59.01 SLOW TRANSIT CONSTIPATION: ICD-10-CM

## 2020-09-15 RX ORDER — ATORVASTATIN CALCIUM 40 MG/1
40 TABLET, FILM COATED ORAL DAILY
Qty: 90 TABLET | Refills: 0 | Status: SHIPPED | OUTPATIENT
Start: 2020-09-15 | End: 2020-12-14

## 2020-09-15 RX ORDER — LISINOPRIL 5 MG/1
5 TABLET ORAL DAILY
Qty: 90 TABLET | Refills: 0 | Status: SHIPPED | OUTPATIENT
Start: 2020-09-15 | End: 2020-12-14

## 2020-09-15 RX ORDER — POLYETHYLENE GLYCOL 3350 17 G/17G
POWDER, FOR SOLUTION ORAL
Qty: 510 G | Refills: 1 | Status: SHIPPED | OUTPATIENT
Start: 2020-09-15 | End: 2020-11-17

## 2020-09-15 RX ORDER — OMEPRAZOLE 40 MG/1
40 CAPSULE, DELAYED RELEASE ORAL
Qty: 60 CAPSULE | Refills: 9 | Status: SHIPPED | OUTPATIENT
Start: 2020-09-15 | End: 2021-07-07

## 2020-09-15 NOTE — TELEPHONE ENCOUNTER
omeprazole  Last Written Prescription Date: 10/22/2019  Last Fill Quantity: 60 # of Refills: 9  Last Office Visit: 8/28/20202

## 2020-09-15 NOTE — TELEPHONE ENCOUNTER
Miralax 17 gm      Last Written Prescription Date:  7/15/20  Last Fill Quantity: 510 g,   # refills: 1  Last Office Visit: 8/28/20  Future Office visit:       Routing refill request to provider for review/approval because:      Lipitor 40 mg      Last Written Prescription Date:  6/9/20  Last Fill Quantity: 90,   # refills: 0  Last Office Visit: 8/28/20  Future Office visit:       Routing refill request to provider for review/approval because:      Lisinopril 5 mg      Last Written Prescription Date:  6/9/20  Last Fill Quantity: 90,   # refills: 0  Last Office Visit: 8/28/20  Future Office visit:       Routing refill request to provider for review/approval because:

## 2020-09-21 DIAGNOSIS — N42.9 DISORDER OF PROSTATE: ICD-10-CM

## 2020-09-22 NOTE — TELEPHONE ENCOUNTER
Alcohol swabs       Last Written Prescription Date:  6-4-19  Last Fill Quantity: 180,   # refills: 011  Last Office Visit: 8-28-20  Future Office visit:

## 2020-09-23 RX ORDER — ISOPROPYL ALCOHOL 0.75 G/1
SWAB TOPICAL
Qty: 200 EACH | Refills: 11 | Status: SHIPPED | OUTPATIENT
Start: 2020-09-23 | End: 2022-02-09

## 2020-10-02 DIAGNOSIS — K59.01 SLOW TRANSIT CONSTIPATION: ICD-10-CM

## 2020-10-05 NOTE — TELEPHONE ENCOUNTER
Adore       Last Written Prescription Date:  7/07/2020  Last Fill Quantity: 100,   # refills: 1  Last Office Visit:8/28/2020

## 2020-10-06 RX ORDER — SENNA AND DOCUSATE SODIUM 50; 8.6 MG/1; MG/1
TABLET, FILM COATED ORAL
Qty: 100 TABLET | Refills: 1 | Status: SHIPPED | OUTPATIENT
Start: 2020-10-06 | End: 2021-02-01

## 2020-11-12 DIAGNOSIS — K59.01 SLOW TRANSIT CONSTIPATION: ICD-10-CM

## 2020-11-12 DIAGNOSIS — J30.2 CHRONIC SEASONAL ALLERGIC RHINITIS: ICD-10-CM

## 2020-11-13 RX ORDER — CETIRIZINE HYDROCHLORIDE 10 MG/1
TABLET ORAL
Qty: 90 TABLET | Refills: 0 | Status: SHIPPED | OUTPATIENT
Start: 2020-11-13 | End: 2021-02-11

## 2020-11-13 NOTE — TELEPHONE ENCOUNTER
ZYRTEC      Last Written Prescription Date:  7-  Last Fill Quantity: 90,   # refills: 0  Last Office Visit: 8-  Future Office visit:

## 2020-11-16 NOTE — TELEPHONE ENCOUNTER
miralax      Last Written Prescription Date:  9/15/20  Last Fill Quantity: 510,   # refills: 1  Last Office Visit: 8/28/20  Future Office visit:

## 2020-11-17 DIAGNOSIS — E11.9 TYPE 2 DIABETES MELLITUS WITHOUT COMPLICATION, WITHOUT LONG-TERM CURRENT USE OF INSULIN (H): ICD-10-CM

## 2020-11-17 RX ORDER — POLYETHYLENE GLYCOL 3350 17 G/17G
POWDER, FOR SOLUTION ORAL
Qty: 510 G | Refills: 1 | Status: SHIPPED | OUTPATIENT
Start: 2020-11-17 | End: 2020-12-14

## 2020-12-12 DIAGNOSIS — K59.01 SLOW TRANSIT CONSTIPATION: ICD-10-CM

## 2020-12-12 DIAGNOSIS — E11.9 TYPE 2 DIABETES MELLITUS WITHOUT COMPLICATION, WITHOUT LONG-TERM CURRENT USE OF INSULIN (H): ICD-10-CM

## 2020-12-14 RX ORDER — POLYETHYLENE GLYCOL 3350 17 G/17G
POWDER, FOR SOLUTION ORAL
Qty: 510 G | Refills: 1 | Status: SHIPPED | OUTPATIENT
Start: 2020-12-14 | End: 2021-04-21

## 2020-12-14 RX ORDER — LISINOPRIL 5 MG/1
5 TABLET ORAL DAILY
Qty: 90 TABLET | Refills: 0 | Status: SHIPPED | OUTPATIENT
Start: 2020-12-14 | End: 2021-03-15

## 2020-12-14 RX ORDER — ATORVASTATIN CALCIUM 40 MG/1
40 TABLET, FILM COATED ORAL DAILY
Qty: 90 TABLET | Refills: 0 | Status: SHIPPED | OUTPATIENT
Start: 2020-12-14 | End: 2021-03-15

## 2020-12-14 NOTE — TELEPHONE ENCOUNTER
atorvastatin      Last Written Prescription Date:  09/15/2020  Last Fill Quantity: 90,   # refills: 0  Last Office Visit: 08/28/2020  Future Office visit:       lisinopril      Last Written Prescription Date:  09/15/2020  Last Fill Quantity: 90,   # refills: 0    miralax      Last Written Prescription Date:  11/17/2020  Last Fill Quantity: 510 g,   # refills: 1

## 2021-01-13 DIAGNOSIS — E11.9 TYPE 2 DIABETES MELLITUS WITHOUT COMPLICATION, WITHOUT LONG-TERM CURRENT USE OF INSULIN (H): ICD-10-CM

## 2021-01-13 NOTE — TELEPHONE ENCOUNTER
metformin      Last Written Prescription Date:  07/15/2020  Last Fill Quantity: 60,   # refills: 2  Last Office Visit: 08/28/2020  Future Office visit:

## 2021-01-14 RX ORDER — METFORMIN HCL 500 MG
500 TABLET, EXTENDED RELEASE 24 HR ORAL
Qty: 60 TABLET | Refills: 2 | Status: SHIPPED | OUTPATIENT
Start: 2021-01-14 | End: 2021-07-07

## 2021-01-26 DIAGNOSIS — J30.1 SEASONAL ALLERGIC RHINITIS DUE TO POLLEN: ICD-10-CM

## 2021-01-26 RX ORDER — FLUTICASONE PROPIONATE 50 MCG
1 SPRAY, SUSPENSION (ML) NASAL DAILY
Qty: 16 G | Refills: 5 | Status: SHIPPED | OUTPATIENT
Start: 2021-01-26 | End: 2022-02-09

## 2021-02-01 DIAGNOSIS — E11.9 TYPE 2 DIABETES MELLITUS WITHOUT COMPLICATION, WITHOUT LONG-TERM CURRENT USE OF INSULIN (H): ICD-10-CM

## 2021-02-01 DIAGNOSIS — K59.01 SLOW TRANSIT CONSTIPATION: ICD-10-CM

## 2021-02-01 RX ORDER — ASPIRIN 81 MG
TABLET, DELAYED RELEASE (ENTERIC COATED) ORAL
Qty: 100 TABLET | Refills: 1 | Status: SHIPPED | OUTPATIENT
Start: 2021-02-01 | End: 2021-04-05

## 2021-02-01 RX ORDER — ASPIRIN 81 MG/1
TABLET, COATED ORAL
Qty: 90 TABLET | Refills: 3 | Status: SHIPPED | OUTPATIENT
Start: 2021-02-01 | End: 2022-02-09

## 2021-02-01 NOTE — TELEPHONE ENCOUNTER
aspirin      Last Written Prescription Date:  6/16/2020  Last Fill Quantity: 90,   # refills: 3  Last Office Visit: 8/28/2020  Future Office visit:       Routing refill request to provider for review/approval because:    Senna plus 8.6-50mg      Last Written Prescription Date:  10/6/2020  Last Fill Quantity: 100,   # refills: 1  Last Office Visit: 8/28/2020  Future Office visit:       Routing refill request to provider for review/approval because:

## 2021-02-11 DIAGNOSIS — J30.2 CHRONIC SEASONAL ALLERGIC RHINITIS: ICD-10-CM

## 2021-02-11 RX ORDER — CETIRIZINE HYDROCHLORIDE 10 MG/1
TABLET ORAL
Qty: 90 TABLET | Refills: 0 | Status: SHIPPED | OUTPATIENT
Start: 2021-02-11 | End: 2021-05-11

## 2021-02-11 NOTE — TELEPHONE ENCOUNTER
Zyrtec 10mg      Last Written Prescription Date:  11/13/20  Last Fill Quantity: 90,   # refills: 0  Last Office Visit: 8/28/20  Future Office visit:       Routing refill request to provider for review/approval because:

## 2021-02-19 NOTE — TELEPHONE ENCOUNTER
Adore       Last Written Prescription Date:  11/7/2019  Last Fill Quantity: 60,   # refills: 2  Last Office Visit: 10/11/2019  Future Office visit:    Next 5 appointments (look out 90 days)    David 10, 2020  8:40 AM CST  (Arrive by 8:25 AM)  SHORT with eBrny Velasquez MD  [facility] [place] ([facility] [place] ) [address]  [place] [address]  [phone]              [General Appearance - Well Developed] : well developed [General Appearance - Well Nourished] : well nourished [Normal Appearance] : normal appearance [Well Groomed] : well groomed [General Appearance - In No Acute Distress] : no acute distress [Edema] : no peripheral edema [Respiration, Rhythm And Depth] : normal respiratory rhythm and effort [Abdomen Soft] : soft [Exaggerated Use Of Accessory Muscles For Inspiration] : no accessory muscle use [Abdomen Tenderness] : non-tender [Costovertebral Angle Tenderness] : no ~M costovertebral angle tenderness [Normal Station and Gait] : the gait and station were normal for the patient's age [Urinary Bladder Findings] : the bladder was normal on palpation [] : no rash [No Focal Deficits] : no focal deficits [Oriented To Time, Place, And Person] : oriented to person, place, and time [Affect] : the affect was normal [Mood] : the mood was normal [Not Anxious] : not anxious

## 2021-03-15 DIAGNOSIS — E11.9 TYPE 2 DIABETES MELLITUS WITHOUT COMPLICATION, WITHOUT LONG-TERM CURRENT USE OF INSULIN (H): ICD-10-CM

## 2021-03-15 RX ORDER — LISINOPRIL 5 MG/1
5 TABLET ORAL DAILY
Qty: 90 TABLET | Refills: 0 | Status: SHIPPED | OUTPATIENT
Start: 2021-03-15 | End: 2021-06-08

## 2021-03-15 RX ORDER — ATORVASTATIN CALCIUM 40 MG/1
40 TABLET, FILM COATED ORAL DAILY
Qty: 90 TABLET | Refills: 0 | Status: SHIPPED | OUTPATIENT
Start: 2021-03-15 | End: 2021-06-08

## 2021-03-15 NOTE — TELEPHONE ENCOUNTER
atorvastatin      Last Written Prescription Date:  12/14/2020  Last Fill Quantity: 90,   # refills: 0  Last Office Visit: 08/28/2020  Future Office visit:       lisinopril      Last Written Prescription Date:  12/14/2020  Last Fill Quantity: 90,   # refills: 0

## 2021-04-02 DIAGNOSIS — K59.01 SLOW TRANSIT CONSTIPATION: ICD-10-CM

## 2021-04-05 RX ORDER — ASPIRIN 81 MG
TABLET, DELAYED RELEASE (ENTERIC COATED) ORAL
Qty: 100 TABLET | Refills: 0 | Status: SHIPPED | OUTPATIENT
Start: 2021-04-05 | End: 2021-07-13

## 2021-04-19 DIAGNOSIS — K59.01 SLOW TRANSIT CONSTIPATION: ICD-10-CM

## 2021-04-20 NOTE — TELEPHONE ENCOUNTER
GAVILAX 17 GM/SCOOP powder    Last Written Prescription Date:  12/14/2020  Last Fill Quantity: 510 g   # refills: 1  Last Office Visit: 08/28/2021  Future Office visit:       Routing refill request to provider for review/approval because:

## 2021-04-21 RX ORDER — POLYETHYLENE GLYCOL 3350 17 G/17G
POWDER, FOR SOLUTION ORAL
Qty: 510 G | Refills: 1 | Status: SHIPPED | OUTPATIENT
Start: 2021-04-21 | End: 2021-06-30

## 2021-05-11 DIAGNOSIS — J30.2 CHRONIC SEASONAL ALLERGIC RHINITIS: ICD-10-CM

## 2021-05-11 RX ORDER — CETIRIZINE HYDROCHLORIDE 10 MG/1
TABLET ORAL
Qty: 90 TABLET | Refills: 0 | Status: SHIPPED | OUTPATIENT
Start: 2021-05-11 | End: 2021-07-13

## 2021-05-11 NOTE — TELEPHONE ENCOUNTER
Zyrtec 10mg      Last Written Prescription Date:  2/11/21  Last Fill Quantity: 90,   # refills: 0  Last Office Visit: 8/28/20  Future Office visit:       Routing refill request to provider for review/approval because:

## 2021-06-02 DIAGNOSIS — E11.9 TYPE 2 DIABETES MELLITUS WITHOUT COMPLICATION, WITHOUT LONG-TERM CURRENT USE OF INSULIN (H): ICD-10-CM

## 2021-06-03 RX ORDER — LANCETS
EACH MISCELLANEOUS
Qty: 90 EACH | Refills: 10 | Status: SHIPPED | OUTPATIENT
Start: 2021-06-03 | End: 2022-12-23

## 2021-06-03 NOTE — TELEPHONE ENCOUNTER
microlet lancets MISC      Last Written Prescription Date:  5/19/2020  Last Fill Quantity: 50,   # refills: 10  Last Office Visit: 8/28/2020  Future Office visit:    Next 5 appointments (look out 90 days)    Sep 01, 2021 10:00 AM  (Arrive by 9:45 AM)  PHYSICAL with Berny Velasquez MD  [facility] [place] ([facility] [place] ) [address]  [place] [address]  [phone]           Routing refill request to provider for review/approval because:

## 2021-06-05 DIAGNOSIS — E11.9 TYPE 2 DIABETES MELLITUS WITHOUT COMPLICATION, WITHOUT LONG-TERM CURRENT USE OF INSULIN (H): ICD-10-CM

## 2021-06-07 NOTE — TELEPHONE ENCOUNTER
atorvastatin      Last Written Prescription Date:  03/15/2021  Last Fill Quantity: 90,   # refills: 0  Last Office Visit: 08/28/2020  Future Office visit:    Next 5 appointments (look out 90 days)    Sep 01, 2021 10:00 AM  (Arrive by 9:45 AM)  PHYSICAL with Berny Velasquez MD  [facility] ([facility] ) [address]  [place] [address]  [phone]           lisinopril      Last Written Prescription Date:  03/15/2021  Last Fill Quantity: 90,   # refills: 0

## 2021-06-08 RX ORDER — LISINOPRIL 5 MG/1
5 TABLET ORAL DAILY
Qty: 90 TABLET | Refills: 0 | Status: SHIPPED | OUTPATIENT
Start: 2021-06-08 | End: 2021-09-08

## 2021-06-08 RX ORDER — ATORVASTATIN CALCIUM 40 MG/1
40 TABLET, FILM COATED ORAL DAILY
Qty: 90 TABLET | Refills: 0 | Status: SHIPPED | OUTPATIENT
Start: 2021-06-08 | End: 2021-09-08

## 2021-06-10 ENCOUNTER — TRANSFERRED RECORDS (OUTPATIENT)
Dept: HEALTH INFORMATION MANAGEMENT | Facility: HOSPITAL | Age: 41
End: 2021-06-10

## 2021-06-10 LAB — RETINOPATHY: NORMAL

## 2021-06-14 DIAGNOSIS — E55.9 VITAMIN D DEFICIENCY: ICD-10-CM

## 2021-06-15 RX ORDER — CHOLECALCIFEROL (VITAMIN D3) 50 MCG
TABLET ORAL
Qty: 100 TABLET | Refills: 1 | Status: SHIPPED | OUTPATIENT
Start: 2021-06-15 | End: 2021-10-07

## 2021-06-15 NOTE — TELEPHONE ENCOUNTER
vitamin D3 (CHOLECALCIFEROL) 50 mcg (2000 units) tablet     Last Written Prescription Date:  8/21/20  Last Fill Quantity: 100,   # refills: 1  Last Office Visit: 8/28/20  Future Office visit:    Next 5 appointments (look out 90 days)    Sep 01, 2021 10:00 AM  (Arrive by 9:45 AM)  PHYSICAL with Berny Velasquez MD  [facility] [place] ([facility] [place] ) [address]  [place] [address]  [phone]           Routing refill request to provider for review/approval

## 2021-06-29 DIAGNOSIS — K59.01 SLOW TRANSIT CONSTIPATION: ICD-10-CM

## 2021-06-30 RX ORDER — POLYETHYLENE GLYCOL 3350 17 G/17G
POWDER, FOR SOLUTION ORAL
Qty: 510 G | Refills: 1 | Status: SHIPPED | OUTPATIENT
Start: 2021-06-30 | End: 2021-09-08

## 2021-07-07 DIAGNOSIS — E11.9 TYPE 2 DIABETES MELLITUS WITHOUT COMPLICATION, WITHOUT LONG-TERM CURRENT USE OF INSULIN (H): ICD-10-CM

## 2021-07-07 DIAGNOSIS — F91.9 DISRUPTIVE BEHAVIOR DISORDER: ICD-10-CM

## 2021-07-07 DIAGNOSIS — K21.9 GASTROESOPHAGEAL REFLUX DISEASE WITHOUT ESOPHAGITIS: ICD-10-CM

## 2021-07-07 RX ORDER — METFORMIN HCL 500 MG
500 TABLET, EXTENDED RELEASE 24 HR ORAL
Qty: 60 TABLET | Refills: 2 | Status: SHIPPED | OUTPATIENT
Start: 2021-07-07 | End: 2021-08-30

## 2021-07-07 RX ORDER — OMEPRAZOLE 40 MG/1
40 CAPSULE, DELAYED RELEASE ORAL
Qty: 60 CAPSULE | Refills: 9 | Status: SHIPPED | OUTPATIENT
Start: 2021-07-07 | End: 2022-05-04

## 2021-07-07 NOTE — TELEPHONE ENCOUNTER
omeprazole (PRILOSEC) 40 MG DR capsule  Last Written Prescription Date:  9/15/20  Last Fill Quantity: 60,   # refills: 9  Last Office Visit: 8/28/20  Future Office visit:    Next 5 appointments (look out 90 days)    Jul 09, 2021  4:00 PM  (Arrive by 3:45 PM)  SHORT with Berny Velasquez MD  [facility] [place] ([facility] [place] ) [address] [place] [address]  [phone]   Sep 01, 2021 10:00 AM  (Arrive by 9:45 AM)  PHYSICAL with Berny Velasquez MD  [facility] ([facility] [place] ) [address] [place] [address]  [phone]           metFORMIN (GLUCOPHAGE-XR) 500 MG 24 hr tablet     Last Written Prescription Date:  1/14/21  Last Fill Quantity: 60,   # refills: 2  Last Office Visit: 8/28/20  Future Office visit:    Next 5 appointments (look out 90 days)    Jul 09, 2021  4:00 PM  (Arrive by 3:45 PM)  SHORT with Berny Velasquez MD  [facility] ([facility] [place] ) [address] [place] [address]  [phone]   Sep 01, 2021 10:00 AM  (Arrive by 9:45 AM)  PHYSICAL with Berny Velasquez MD  [facility] [place] ([facility] [place] ) [address] [place] [address]  [phone]           Routing refill request to provider for review/approval

## 2021-07-12 DIAGNOSIS — K59.01 SLOW TRANSIT CONSTIPATION: ICD-10-CM

## 2021-07-12 DIAGNOSIS — E11.9 TYPE 2 DIABETES MELLITUS WITHOUT COMPLICATION, WITHOUT LONG-TERM CURRENT USE OF INSULIN (H): ICD-10-CM

## 2021-07-12 DIAGNOSIS — J30.2 CHRONIC SEASONAL ALLERGIC RHINITIS: ICD-10-CM

## 2021-07-13 RX ORDER — ASPIRIN 81 MG
TABLET, DELAYED RELEASE (ENTERIC COATED) ORAL
Qty: 100 TABLET | Refills: 0 | Status: SHIPPED | OUTPATIENT
Start: 2021-07-13 | End: 2021-09-08

## 2021-07-13 RX ORDER — CETIRIZINE HYDROCHLORIDE 10 MG/1
TABLET ORAL
Qty: 90 TABLET | Refills: 0 | Status: SHIPPED | OUTPATIENT
Start: 2021-07-13 | End: 2021-11-04

## 2021-08-12 DIAGNOSIS — Z00.00 HEALTH CARE MAINTENANCE: ICD-10-CM

## 2021-08-12 RX ORDER — MULTIVIT,CALC,MINS/IRON/FOLIC 9MG-400MCG
TABLET ORAL
Qty: 100 CAPSULE | Refills: 7 | Status: SHIPPED | OUTPATIENT
Start: 2021-08-12 | End: 2022-08-17

## 2021-08-27 DIAGNOSIS — E11.9 TYPE 2 DIABETES MELLITUS WITHOUT COMPLICATION, WITHOUT LONG-TERM CURRENT USE OF INSULIN (H): ICD-10-CM

## 2021-08-30 RX ORDER — METFORMIN HCL 500 MG
500 TABLET, EXTENDED RELEASE 24 HR ORAL
Qty: 60 TABLET | Refills: 2 | Status: SHIPPED | OUTPATIENT
Start: 2021-08-30 | End: 2022-01-11

## 2021-08-30 NOTE — TELEPHONE ENCOUNTER
metformin      Last Written Prescription Date:  7/7/21  Last Fill Quantity: 60,   # refills: 2  Last Office Visit: 8/28/2020  Future Office visit:    Next 5 appointments (look out 90 days)    Sep 01, 2021 10:00 AM  (Arrive by 9:45 AM)  PHYSICAL with Berny Velasquez MD  [facility] [place] ([facility] [place] ) [address]  [place] [address]  [phone]

## 2021-08-31 ENCOUNTER — HOSPITAL ENCOUNTER (EMERGENCY)
Facility: HOSPITAL | Age: 41
Discharge: HOME | End: 2021-08-31
Attending: STUDENT IN AN ORGANIZED HEALTH CARE EDUCATION/TRAINING PROGRAM | Admitting: STUDENT IN AN ORGANIZED HEALTH CARE EDUCATION/TRAINING PROGRAM
Payer: MEDICARE

## 2021-08-31 VITALS
RESPIRATION RATE: 16 BRPM | TEMPERATURE: 98.9 F | SYSTOLIC BLOOD PRESSURE: 123 MMHG | OXYGEN SATURATION: 95 % | DIASTOLIC BLOOD PRESSURE: 80 MMHG | HEART RATE: 110 BPM

## 2021-08-31 DIAGNOSIS — R45.6 VIOLENT BEHAVIOR: ICD-10-CM

## 2021-08-31 LAB
ALBUMIN UR-MCNC: 100 MG/DL
APPEARANCE UR: CLEAR
BILIRUB UR QL STRIP: NEGATIVE
COLOR UR AUTO: YELLOW
GLUCOSE BLDC GLUCOMTR-MCNC: 124 MG/DL (ref 70–99)
GLUCOSE UR STRIP-MCNC: NEGATIVE MG/DL
HGB UR QL STRIP: NEGATIVE
KETONES UR STRIP-MCNC: 10 MG/DL
LEUKOCYTE ESTERASE UR QL STRIP: NEGATIVE
MUCOUS THREADS #/AREA URNS LPF: PRESENT /LPF
NITRATE UR QL: NEGATIVE
PH UR STRIP: 6 [PH] (ref 4.7–8)
RBC URINE: 1 /HPF
SP GR UR STRIP: 1.02 (ref 1–1.03)
UROBILINOGEN UR STRIP-MCNC: NORMAL MG/DL
WBC URINE: 1 /HPF

## 2021-08-31 PROCEDURE — 81001 URINALYSIS AUTO W/SCOPE: CPT | Performed by: STUDENT IN AN ORGANIZED HEALTH CARE EDUCATION/TRAINING PROGRAM

## 2021-08-31 PROCEDURE — 99283 EMERGENCY DEPT VISIT LOW MDM: CPT

## 2021-08-31 PROCEDURE — 250N000013 HC RX MED GY IP 250 OP 250 PS 637: Performed by: STUDENT IN AN ORGANIZED HEALTH CARE EDUCATION/TRAINING PROGRAM

## 2021-08-31 PROCEDURE — 99282 EMERGENCY DEPT VISIT SF MDM: CPT | Performed by: STUDENT IN AN ORGANIZED HEALTH CARE EDUCATION/TRAINING PROGRAM

## 2021-08-31 RX ORDER — OLANZAPINE 5 MG/1
5 TABLET, ORALLY DISINTEGRATING ORAL AT BEDTIME
Status: DISCONTINUED | OUTPATIENT
Start: 2021-08-31 | End: 2021-08-31 | Stop reason: HOSPADM

## 2021-08-31 RX ADMIN — OLANZAPINE 5 MG: 5 TABLET, ORALLY DISINTEGRATING ORAL at 08:27

## 2021-08-31 NOTE — ED TRIAGE NOTES
Patient escorted by police. Patient comes from group home and reports were he was biting and fighting staff. When police arrived patient was being held down by 2 staff.

## 2021-08-31 NOTE — ED NOTES
Plan of care discussed with patient and house worker. No questions/concerns were addressed. Patient is calm and cooperative and left with a steady gait with house worker.

## 2021-08-31 NOTE — DISCHARGE INSTRUCTIONS
Return to the emergency department if you have worsening symptoms or any new concerning symptoms, please continue on your home medication regimen, call to follow-up with your physician to see if he would like to see you in clinic.

## 2021-08-31 NOTE — ED NOTES
2 staff from facility are at patient's bedside. Patient is calm and cooperative with care at this time. Security on outside of window at this time. Will continue to monitor. Provider speaking with patient and with staff at this time.

## 2021-08-31 NOTE — ED PROVIDER NOTES
History     Chief Complaint   Patient presents with     Psychiatric Evaluation     HPI  Casey Pedro is a 40 year old male with a history of type 2 diabetes, disruptive behavior disorder, autism who presents to the emergency department today brought in by police after he bit to of the staff at his group home.  Reportedly when the police arrived, the staff was holding him down, he had bit himself on both of his arms without puncturing the skin, when the police became involved, he immediately calm down, did not require any further physical restraint and was brought into the emergency department for further evaluation.  Here he is calm but unable to participate in meaningful conversation    Allergies:  No Known Allergies    Problem List:    Patient Active Problem List    Diagnosis Date Noted     Type 2 diabetes mellitus without complication, without long-term current use of insulin (H) 04/24/2019     Priority: Medium     Mild intermittent asthma without complication 02/20/2018     Priority: Medium     Slow transit constipation 02/20/2018     Priority: Medium     Seasonal allergic rhinitis 09/27/2016     Priority: Medium     Disruptive behavior disorder 08/11/2016     Priority: Medium     Annual NHS Examination 01/06/2015     Priority: Medium     BPH (begnign prostatic hyperplasia) 09/16/2011     Priority: Medium     Mental retardation 09/16/2011     Priority: Medium     Autism 09/16/2011     Priority: Medium        Past Medical History:    Past Medical History:   Diagnosis Date     Autism 09/16/2011     BPH 09/16/2011     Diabetes mellitus, type 2 (H)      Mental retardation 09/16/2011       Past Surgical History:    History reviewed. No pertinent surgical history.    Family History:    Family History   Problem Relation Age of Onset     Alcohol/Drug Father         alcoholism       Social History:  Marital Status:  Single [1]  Social History     Tobacco Use     Smoking status: Never Smoker     Smokeless tobacco:  Never Used     Tobacco comment: no passive exposure   Substance Use Topics     Alcohol use: No     Drug use: No        Medications:    blood glucose (CONTOUR NEXT TEST) test strip  cetirizine (ZYRTEC) 10 MG tablet  SENNA PLUS 8.6-50 MG tablet  Alcohol Swabs (B-D SINGLE USE SWABS REGULAR) PADS  ASPIRIN LOW DOSE 81 MG EC tablet  atorvastatin (LIPITOR) 40 MG tablet  blood glucose monitoring (NO BRAND SPECIFIED) meter device kit  divalproex sodium delayed-release (DEPAKOTE) 500 MG DR tablet  fluticasone (FLONASE) 50 MCG/ACT nasal spray  GAVILAX 17 GM/SCOOP powder  guanFACINE (TENEX) 1 MG tablet  ipratropium - albuterol 0.5 mg/2.5 mg/3 mL (DUONEB) 0.5-2.5 (3) MG/3ML neb solution  lisinopril (ZESTRIL) 5 MG tablet  LORazepam (ATIVAN) 1 MG tablet  metFORMIN (GLUCOPHAGE-XR) 500 MG 24 hr tablet  Microlet Lancets MISC  OLANZapine (ZYPREXA) 10 MG tablet  OLANZapine (ZYPREXA) 20 MG tablet  OLANZAPINE PO  Omega-3 Fatty Acids (GNP FISH OIL) 1000 MG CAPS  omeprazole (PRILOSEC) 40 MG DR capsule  risperiDONE (RISPERDAL) 0.25 MG tablet  vitamin D3 (CHOLECALCIFEROL) 50 mcg (2000 units) tablet          Review of Systems  A complete review of systems was performed and is otherwise negative.     Physical Exam   BP: 123/80  Pulse: 110  Temp: 98.9  F (37.2  C)  Resp: 16  SpO2: 95 %      Physical Exam  Constitutional: Alert and conversant. NAD   HENT: NCAT   Eyes: Normal pupils   Neck: supple   CV: No pallor  Pulmonary/Chest: Non-labored respirations  Abdominal: non-distended   MSK: RESENDIZ.   Neuro: Alert and appropriate   Skin: Warm and dry. No diaphoresis. No rashes on exposed skin    Psych: Appropriate mood and affect     ED Course     ED Course as of Aug 31 1001   Tue Aug 31, 2021   0812 On arrival, patient calm and appropriate, normal glucose, initial heart rate slightly elevated, will check otherwise all of the vitals within normal limits.  The heart rate could certainly be because of the situation as well.  No signs of infection, abdomen  soft, no specific concerns about fevers or cough or constipation or urinary symptoms from staff.  Patient remaining calm at this time, I did offer him 5 of oral Zyprexa which he accepted.  We will get that for him now.  Staff is amenable to taking him home if his behaviors remain calm      0959 Patient did accept the 5 mg oral Zyprexa and has been resting comfortably easy to awake.  We spoke with the group [place] staff who is happy to bring him home, they are now concerned that he might have a urinary tract infection I am happy to order a urine.  We will plan for transport home to the [facility] after we collected urine.        Procedures              Results for orders placed or performed during the hospital encounter of 08/31/21 (from the past 24 hour(s))   Glucose by meter   Result Value Ref Range    GLUCOSE BY METER POCT 124 (H) 70 - 99 mg/dL       Medications   OLANZapine zydis (zyPREXA) ODT tab 5 mg (5 mg Oral Given 8/31/21 0827)       Assessments & Plan (with Medical Decision Making)     I have reviewed the nursing notes.    I have reviewed the findings, diagnosis, plan and need for follow up with the patient.    New Prescriptions    No medications on file       Final diagnoses:   Violent behavior       8/31/2021   [facility]     Saturnino Ramos MD  08/31/21 1001

## 2021-08-31 NOTE — ED NOTES
Care Transitions focused note:      Chart reviewed, discussed with nsg and provider.  Pt presenting with [facility] POWERS along with staff from [facility].  According to PD, patient was biting staff and himself on own arms and was brought to the ED to be evaluated.   Pt has hx of mental retardation and autism.  Pt's PCP is Dr Velasquez.  Pt has been calm and cooperative while here in the ED.  He was given some medication and will be sent home once the [facility] staff arrives to transport him.    No further concerns at this time. Pt will be managed by [facility] staff.  He has an appointment to see Dr Velasquez coming up on Sept 1st.    GAYATHRI White

## 2021-09-01 ENCOUNTER — OFFICE VISIT (OUTPATIENT)
Dept: FAMILY MEDICINE | Facility: OTHER | Age: 41
End: 2021-09-01
Attending: FAMILY MEDICINE
Payer: MEDICARE

## 2021-09-01 VITALS
DIASTOLIC BLOOD PRESSURE: 72 MMHG | HEART RATE: 82 BPM | BODY MASS INDEX: 33.33 KG/M2 | SYSTOLIC BLOOD PRESSURE: 126 MMHG | TEMPERATURE: 98.2 F | HEIGHT: 69 IN | OXYGEN SATURATION: 98 % | WEIGHT: 225 LBS | RESPIRATION RATE: 16 BRPM

## 2021-09-01 DIAGNOSIS — E11.9 TYPE 2 DIABETES MELLITUS WITHOUT COMPLICATION, WITHOUT LONG-TERM CURRENT USE OF INSULIN (H): ICD-10-CM

## 2021-09-01 DIAGNOSIS — K59.01 SLOW TRANSIT CONSTIPATION: ICD-10-CM

## 2021-09-01 DIAGNOSIS — Z00.00 ROUTINE GENERAL MEDICAL EXAMINATION AT A HEALTH CARE FACILITY: Primary | ICD-10-CM

## 2021-09-01 DIAGNOSIS — F70 MILD INTELLECTUAL DISABILITY: ICD-10-CM

## 2021-09-01 DIAGNOSIS — K21.9 GASTROESOPHAGEAL REFLUX DISEASE WITHOUT ESOPHAGITIS: ICD-10-CM

## 2021-09-01 DIAGNOSIS — J30.2 CHRONIC SEASONAL ALLERGIC RHINITIS: ICD-10-CM

## 2021-09-01 DIAGNOSIS — N40.0 HYPERTROPHY OF PROSTATE WITHOUT URINARY OBSTRUCTION: ICD-10-CM

## 2021-09-01 DIAGNOSIS — F91.9 DISRUPTIVE BEHAVIOR DISORDER: ICD-10-CM

## 2021-09-01 LAB
ALBUMIN SERPL-MCNC: 3.9 G/DL (ref 3.4–5)
ALP SERPL-CCNC: 39 U/L (ref 40–150)
ALT SERPL W P-5'-P-CCNC: 46 U/L (ref 0–70)
ANION GAP SERPL CALCULATED.3IONS-SCNC: 5 MMOL/L (ref 3–14)
AST SERPL W P-5'-P-CCNC: 29 U/L (ref 0–45)
BASOPHILS # BLD AUTO: 0 10E3/UL (ref 0–0.2)
BASOPHILS NFR BLD AUTO: 1 %
BILIRUB SERPL-MCNC: 0.5 MG/DL (ref 0.2–1.3)
BUN SERPL-MCNC: 10 MG/DL (ref 7–30)
CALCIUM SERPL-MCNC: 8.5 MG/DL (ref 8.5–10.1)
CHLORIDE BLD-SCNC: 109 MMOL/L (ref 94–109)
CHOLEST SERPL-MCNC: 89 MG/DL
CO2 SERPL-SCNC: 25 MMOL/L (ref 20–32)
CREAT SERPL-MCNC: 0.71 MG/DL (ref 0.66–1.25)
EOSINOPHIL # BLD AUTO: 0.1 10E3/UL (ref 0–0.7)
EOSINOPHIL NFR BLD AUTO: 1 %
ERYTHROCYTE [DISTWIDTH] IN BLOOD BY AUTOMATED COUNT: 12.9 % (ref 10–15)
EST. AVERAGE GLUCOSE BLD GHB EST-MCNC: 105 MG/DL
FASTING STATUS PATIENT QL REPORTED: NO
GFR SERPL CREATININE-BSD FRML MDRD: >90 ML/MIN/1.73M2
GLUCOSE BLD-MCNC: 104 MG/DL (ref 70–99)
HBA1C MFR BLD: 5.3 % (ref 0–5.6)
HCT VFR BLD AUTO: 46 % (ref 40–53)
HDLC SERPL-MCNC: 40 MG/DL
HGB BLD-MCNC: 16 G/DL (ref 13.3–17.7)
IMM GRANULOCYTES # BLD: 0 10E3/UL
IMM GRANULOCYTES NFR BLD: 0 %
LDLC SERPL CALC-MCNC: 24 MG/DL
LYMPHOCYTES # BLD AUTO: 1.8 10E3/UL (ref 0.8–5.3)
LYMPHOCYTES NFR BLD AUTO: 43 %
MCH RBC QN AUTO: 30 PG (ref 26.5–33)
MCHC RBC AUTO-ENTMCNC: 34.8 G/DL (ref 31.5–36.5)
MCV RBC AUTO: 86 FL (ref 78–100)
MONOCYTES # BLD AUTO: 0.4 10E3/UL (ref 0–1.3)
MONOCYTES NFR BLD AUTO: 10 %
NEUTROPHILS # BLD AUTO: 1.9 10E3/UL (ref 1.6–8.3)
NEUTROPHILS NFR BLD AUTO: 45 %
NONHDLC SERPL-MCNC: 49 MG/DL
NRBC # BLD AUTO: 0 10E3/UL
NRBC BLD AUTO-RTO: 0 /100
PLATELET # BLD AUTO: 153 10E3/UL (ref 150–450)
POTASSIUM BLD-SCNC: 4.5 MMOL/L (ref 3.4–5.3)
PROT SERPL-MCNC: 7.4 G/DL (ref 6.8–8.8)
RBC # BLD AUTO: 5.34 10E6/UL (ref 4.4–5.9)
SODIUM SERPL-SCNC: 139 MMOL/L (ref 133–144)
TRIGL SERPL-MCNC: 126 MG/DL
TSH SERPL DL<=0.005 MIU/L-ACNC: 1.29 MU/L (ref 0.4–4)
WBC # BLD AUTO: 4.1 10E3/UL (ref 4–11)

## 2021-09-01 PROCEDURE — 83036 HEMOGLOBIN GLYCOSYLATED A1C: CPT | Mod: ZL | Performed by: FAMILY MEDICINE

## 2021-09-01 PROCEDURE — 84443 ASSAY THYROID STIM HORMONE: CPT | Mod: ZL | Performed by: FAMILY MEDICINE

## 2021-09-01 PROCEDURE — 80061 LIPID PANEL: CPT | Mod: ZL | Performed by: FAMILY MEDICINE

## 2021-09-01 PROCEDURE — 85025 COMPLETE CBC W/AUTO DIFF WBC: CPT | Mod: ZL | Performed by: FAMILY MEDICINE

## 2021-09-01 PROCEDURE — 80053 COMPREHEN METABOLIC PANEL: CPT | Mod: ZL | Performed by: FAMILY MEDICINE

## 2021-09-01 PROCEDURE — 36415 COLL VENOUS BLD VENIPUNCTURE: CPT | Mod: ZL | Performed by: FAMILY MEDICINE

## 2021-09-01 PROCEDURE — 99395 PREV VISIT EST AGE 18-39: CPT | Performed by: FAMILY MEDICINE

## 2021-09-01 ASSESSMENT — ASTHMA QUESTIONNAIRES
QUESTION_1 LAST FOUR WEEKS HOW MUCH OF THE TIME DID YOUR ASTHMA KEEP YOU FROM GETTING AS MUCH DONE AT WORK, SCHOOL OR AT HOME: NONE OF THE TIME
ACUTE_EXACERBATION_TODAY: NO
QUESTION_2 LAST FOUR WEEKS HOW OFTEN HAVE YOU HAD SHORTNESS OF BREATH: NOT AT ALL
ACT_TOTALSCORE: 25
QUESTION_3 LAST FOUR WEEKS HOW OFTEN DID YOUR ASTHMA SYMPTOMS (WHEEZING, COUGHING, SHORTNESS OF BREATH, CHEST TIGHTNESS OR PAIN) WAKE YOU UP AT NIGHT OR EARLIER THAN USUAL IN THE MORNING: NOT AT ALL
QUESTION_5 LAST FOUR WEEKS HOW WOULD YOU RATE YOUR ASTHMA CONTROL: COMPLETELY CONTROLLED
QUESTION_4 LAST FOUR WEEKS HOW OFTEN HAVE YOU USED YOUR RESCUE INHALER OR NEBULIZER MEDICATION (SUCH AS ALBUTEROL): NOT AT ALL

## 2021-09-01 ASSESSMENT — ACTIVITIES OF DAILY LIVING (ADL)
CURRENT_FUNCTION: TELEPHONE REQUIRES ASSISTANCE
CURRENT_FUNCTION: MEDICATION ADMINISTRATION REQUIRES ASSISTANCE
CURRENT_FUNCTION: HOUSEWORK REQUIRES ASSISTANCE
CURRENT_FUNCTION: BATHING REQUIRES ASSISTANCE
CURRENT_FUNCTION: MONEY MANAGEMENT REQUIRES ASSISTANCE
CURRENT_FUNCTION: SHOPPING REQUIRES ASSISTANCE
CURRENT_FUNCTION: TRANSPORTATION REQUIRES ASSISTANCE
CURRENT_FUNCTION: LAUNDRY REQUIRES ASSISTANCE
CURRENT_FUNCTION: PREPARING MEALS REQUIRES ASSISTANCE

## 2021-09-01 ASSESSMENT — MIFFLIN-ST. JEOR: SCORE: 1927

## 2021-09-01 ASSESSMENT — PAIN SCALES - GENERAL: PAINLEVEL: MILD PAIN (2)

## 2021-09-01 NOTE — LETTER
My Asthma Action Plan    Name: Casey Pedro   YOB: 1980  Date: 9/1/2021   My doctor: Berny Velasquez MD   My clinic[facility]        My Rescue Medicine:   Albuterol inhaler (Proair/Ventolin/Proventil HFA)  2-4 puffs EVERY 4 HOURS as needed. Use a spacer if recommended by your provider.   My Asthma Severity:   Intermittent / Exercise Induced  Know your asthma triggers: upper respiratory infections             GREEN ZONE   Good Control    I feel good    No cough or wheeze    Can work, sleep and play without asthma symptoms       Take your asthma control medicine every day.     1. If exercise triggers your asthma, take your rescue medication    15 minutes before exercise or sports, and    During exercise if you have asthma symptoms  2. Spacer to use with inhaler: If you have a spacer, make sure to use it with your inhaler             YELLOW ZONE Getting Worse  I have ANY of these:    I do not feel good    Cough or wheeze    Chest feels tight    Wake up at night   1. Keep taking your Green Zone medications  2. Start taking your rescue medicine:    every 20 minutes for up to 1 hour. Then every 4 hours for 24-48 hours.  3. If you stay in the Yellow Zone for more than 12-24 hours, contact your doctor.  4. If you do not return to the Green Zone in 12-24 hours or you get worse, start taking your oral steroid medicine if prescribed by your provider.           RED ZONE Medical Alert - Get Help  I have ANY of these:    I feel awful    Medicine is not helping    Breathing getting harder    Trouble walking or talking    Nose opens wide to breathe       1. Take your rescue medicine NOW  2. If your provider has prescribed an oral steroid medicine, start taking it NOW  3. Call your doctor NOW  4. If you are still in the Red Zone after 20 minutes and you have not reached your doctor:    Take your rescue medicine again and    Call 911 or go to the emergency room right away    See your  regular doctor within 2 weeks of an Emergency Room or Urgent Care visit for follow-up treatment.          Annual Reminders:  Meet with Asthma Educator,  Flu Shot in the Fall, consider Pneumonia Vaccination for patients with asthma (aged 19 and older).    Pharmacy:    [facility] [address] [address] [place] [facility] [address] [place] [address]    Electronically signed by Berny Velasquez MD   Date: 09/01/21                    Asthma Triggers  How To Control Things That Make Your Asthma Worse    Triggers are things that make your asthma worse.  Look at the list below to help you find your triggers and   what you can do about them. You can help prevent asthma flare-ups by staying away from your triggers.      Trigger                                                          What you can do   Cigarette Smoke  Tobacco smoke can make asthma worse. Do not allow smoking in your home, car or around you.  Be sure no one smokes at a child s day care or school.  If you smoke, ask your health care provider for ways to help you quit.  Ask family members to quit too.  Ask your health care provider for a referral to Quit Plan to help you quit smoking, or call 3-789-234-PLAN.     Colds, Flu, Bronchitis  These are common triggers of asthma. Wash your hands often.  Don t touch your eyes, nose or mouth.  Get a flu shot every year.     Dust Mites  These are tiny bugs that live in cloth or carpet. They are too small to see. Wash sheets and blankets in hot water every week.   Encase pillows and mattress in dust mite proof covers.  Avoid having carpet if you can. If you have carpet, vacuum weekly.   Use a dust mask and HEPA vacuum.   Pollen and Outdoor Mold  Some people are allergic to trees, grass, or weed pollen, or molds. Try to keep your windows closed.  Limit time out doors when pollen count is high.   Ask you health care provider about taking medicine during allergy season.     Animal Dander  Some people  are allergic to skin flakes, urine or saliva from pets with fur or feathers. Keep pets with fur or feathers out of your home.    If you can t keep the pet outdoors, then keep the pet out of your bedroom.  Keep the bedroom door closed.  Keep pets off cloth furniture and away from stuffed toys.     Mice, Rats, and Cockroaches  Some people are allergic to the waste from these pests.   Cover food and garbage.  Clean up spills and food crumbs.  Store grease in the refrigerator.   Keep food out of the bedroom.   Indoor Mold  This can be a trigger if your home has high moisture. Fix leaking faucets, pipes, or other sources of water.   Clean moldy surfaces.  Dehumidify basement if it is damp and smelly.   Smoke, Strong Odors, and Sprays  These can reduce air quality. Stay away from strong odors and sprays, such as perfume, powder, hair spray, paints, smoke incense, paint, cleaning products, candles and new carpet.   Exercise or Sports  Some people with asthma have this trigger. Be active!  Ask your doctor about taking medicine before sports or exercise to prevent symptoms.    Warm up for 5-10 minutes before and after sports or exercise.     Other Triggers of Asthma  Cold air:  Cover your nose and mouth with a scarf.  Sometimes laughing or crying can be a trigger.  Some medicines and food can trigger asthma.

## 2021-09-01 NOTE — NURSING NOTE
"Chief Complaint   Patient presents with     Physical       Initial /72 (BP Location: Left arm, Patient Position: Sitting, Cuff Size: Adult Large)   Pulse 82   Temp 98.2  F (36.8  C) (Tympanic)   Resp 16   Ht 1.746 m (5' 8.75\")   Wt 102.1 kg (225 lb)   SpO2 98%   BMI 33.47 kg/m   Estimated body mass index is 33.47 kg/m  as calculated from the following:    Height as of this encounter: 1.746 m (5' 8.75\").    Weight as of this encounter: 102.1 kg (225 lb).  Medication Reconciliation: complete  Deepthi Moulton LPN  "

## 2021-09-01 NOTE — PROGRESS NOTES
SUBJECTIVE:   CC: Casey Pedro is an 40 year old male who presents for preventative health visit.     Patient has been advised of split billing requirements and indicates understanding: Yes  HPI      1. foot deformity   No  2. Current or previous foot ulceration     No  3. Current or previous pre-ulcerative calluses     No  4. previous partial amputation of one or both feet or complete amputation of one foot     No  5. peripheral neuropathy with evidence of callus formation     No  6. poor circulation     No      Today's PHQ-2 Score:   PHQ-2 ( 1999 [employer]) 9/1/2021   Q1: Little interest or pleasure in doing things 0   Q2: Feeling down, depressed or hopeless 0   PHQ-2 Score 0   Q1: Little interest or pleasure in doing things Not at all   Q2: Feeling down, depressed or hopeless Not at all   PHQ-2 Score 0       Abuse: Current or Past(Physical, Sexual or Emotional)- NA  Do you feel safe in your environment? Yes    Have you ever done Advance Care Planning? (For example, a Health Directive, POLST, or a discussion with a medical provider or your loved ones about your wishes): Yes, patient states has an Advance Care Planning document and will bring a copy to the clinic.    Social History     Tobacco Use     Smoking status: Never Smoker     Smokeless tobacco: Never Used     Tobacco comment: no passive exposure   Substance Use Topics     Alcohol use: No     If you drink alcohol do you typically have >3 drinks per day or >7 drinks per week? No      Last PSA: No results found for: PSA    Reviewed orders with patient. Reviewed health maintenance and updated orders accordingly - Yes  BP Readings from Last 3 Encounters:   09/01/21 126/72   08/31/21 123/80   08/28/20 90/60    Wt Readings from Last 3 Encounters:   09/01/21 102.1 kg (225 lb)   08/28/20 95.3 kg (210 lb)   07/20/20 94.8 kg (209 lb)                  Patient Active Problem List   Diagnosis     BPH (begnign prostatic hyperplasia)     Mental retardation     Autism      Annual [facility] Examination     Disruptive behavior disorder     Seasonal allergic rhinitis     Mild intermittent asthma without complication     Slow transit constipation     Type 2 diabetes mellitus without complication, without long-term current use of insulin (H)     No past surgical history on file.    Social History     Tobacco Use     Smoking status: Never Smoker     Smokeless tobacco: Never Used     Tobacco comment: no passive exposure   Substance Use Topics     Alcohol use: No     Family History   Problem Relation Age of Onset     Alcohol/Drug Father         alcoholism         Current Outpatient Medications   Medication Sig Dispense Refill     Alcohol Swabs (B-D SINGLE USE SWABS REGULAR) PADS 1 Application every 4 hours as needed 200 each 11     ASPIRIN LOW DOSE 81 MG EC tablet Take 1 tablet (81 mg) by mouth daily 90 tablet 3     atorvastatin (LIPITOR) 40 MG tablet Take 1 tablet (40 mg) by mouth daily 90 tablet 0     blood glucose (CONTOUR NEXT TEST) test strip Use to test blood sugar 2 times daily or as directed. 50 strip 3     blood glucose monitoring (NO BRAND SPECIFIED) meter device kit Use to test blood sugar 2 times daily or as directed. 1 kit 0     cetirizine (ZYRTEC) 10 MG tablet TAKE ONE (1) TABLET BY MOUTH DAILY 90 tablet 0     divalproex sodium delayed-release (DEPAKOTE) 500 MG DR tablet Take 2 tablets (1,000 mg) by mouth 2 times daily 60 tablet 0     fluticasone (FLONASE) 50 MCG/ACT nasal spray Spray 1 spray into both nostrils daily 16 g 5     GAVILAX 17 GM/SCOOP powder Take 17 g (1 capful) by mouth daily 510 g 1     guanFACINE (TENEX) 1 MG tablet Take 1 tablet by mouth twice a day Take one tablet by mouth every day at 8 am and one tablet by mouth every day at 5 pm 60 tablet 0     ipratropium - albuterol 0.5 mg/2.5 mg/3 mL (DUONEB) 0.5-2.5 (3) MG/3ML neb solution Take 1 vial (3 mLs) by nebulization every 4 hours as needed for shortness of breath / dyspnea or wheezing 1 Box 0     lisinopril (ZESTRIL)  5 MG tablet Take 1 tablet (5 mg) by mouth daily 90 tablet 0     LORazepam (ATIVAN) 1 MG tablet Take 1 tablet (1 mg) by mouth 2 times daily 60 tablet 0     metFORMIN (GLUCOPHAGE-XR) 500 MG 24 hr tablet Take 1 tablet (500 mg) by mouth daily (with dinner) 60 tablet 2     Microlet Lancets MISC Use to test blood sugar 2 times daily or as directed. 90 each 10     OLANZapine (ZYPREXA) 20 MG tablet Take 20 mg by mouth daily Take with the 10mg.       OLANZAPINE PO Take 5 mg by mouth daily 11:00am       Omega-3 Fatty Acids (GNP FISH OIL) 1000 MG CAPS TAKE TWO (2) CAPSULES BY MOUTH DAILY 100 capsule 7     omeprazole (PRILOSEC) 40 MG DR capsule Take 1 capsule (40 mg) by mouth two times daily 60 capsule 9     risperiDONE (RISPERDAL) 0.25 MG tablet Take 0.25 mg by mouth 2 times daily  4     SENNA PLUS 8.6-50 MG tablet Take 1 tablet by mouth 2 times daily 100 tablet 0     vitamin D3 (CHOLECALCIFEROL) 50 mcg (2000 units) tablet TAKE ONE TABLET BY MOUTH EVERY DAY (2000UNITS) 100 tablet 1     blood glucose (NO BRAND SPECIFIED) test strip Use to test blood sugar 2 times daily or as directed.       No Known Allergies  Recent Labs   Lab Test 08/06/20  0950 07/20/20  0848 01/20/20  0907 07/23/19  1047 05/20/19  0809 04/15/19  1619 05/01/18  1415 11/15/16  0830   A1C  --  5.3 5.4 5.6  --  8.3*  --  4.7   LDL  --   --  26  --   --   --  Cannot estimate LDL when triglyceride exceeds 400 mg/dL 62   HDL  --   --  39*  --   --   --  28* 35*   TRIG  --   --  118  --   --   --  497* 191*   ALT 38  --  47  --  88* 112*  --   --    CR  --   --  0.73  --   --  0.63*  --   --    GFRESTIMATED  --   --  >90  --   --  >90  --   --    GFRESTBLACK  --   --  >90  --   --  >90  --   --    POTASSIUM  --   --  4.6  --   --  3.7  --   --    TSH  --   --  1.66  --   --  2.65  --   --         Reviewed and updated as needed this visit by clinical staff  Tobacco   Meds              Reviewed and updated as needed this visit by Provider                Past  "Medical History:   Diagnosis Date     Autism 09/16/2011     BPH 09/16/2011     Diabetes mellitus, type 2 (H)      Mental retardation 09/16/2011      No past surgical history on file.    Review of Systems  CONSTITUTIONAL: NEGATIVE for fever, chills, change in weight  INTEGUMENTARY/SKIN: NEGATIVE for worrisome rashes, moles or lesions  EYES: NEGATIVE for vision changes or irritation  ENT: NEGATIVE for ear, mouth and throat problems  RESP: NEGATIVE for significant cough or SOB  CV: NEGATIVE for chest pain, palpitations or peripheral edema  GI: NEGATIVE for nausea, abdominal pain, heartburn, or change in bowel habits   male: negative for dysuria, hematuria, decreased urinary stream, erectile dysfunction, urethral discharge  MUSCULOSKELETAL: NEGATIVE for significant arthralgias or myalgia  NEURO: NEGATIVE for weakness, dizziness or paresthesias  PSYCHIATRIC: NEGATIVE for changes in mood or affect    OBJECTIVE:   /72 (BP Location: Left arm, Patient Position: Sitting, Cuff Size: Adult Large)   Pulse 82   Temp 98.2  F (36.8  C) (Tympanic)   Resp 16   Ht 1.746 m (5' 8.75\")   Wt 102.1 kg (225 lb)   SpO2 98%   BMI 33.47 kg/m      Physical Exam  Constitutional:       General: He is not in acute distress.     Appearance: He is well-developed.   HENT:      Head: Normocephalic and atraumatic.      Right Ear: External ear normal.      Left Ear: External ear normal.      Nose: Nose normal.   Eyes:      Conjunctiva/sclera: Conjunctivae normal.      Pupils: Pupils are equal, round, and reactive to light.   Neck:      Thyroid: No thyromegaly.      Vascular: No JVD.   Cardiovascular:      Rate and Rhythm: Normal rate and regular rhythm.      Pulses: Intact distal pulses.      Heart sounds: Normal heart sounds. No murmur heard.   No friction rub. No gallop.    Pulmonary:      Effort: Pulmonary effort is normal.      Breath sounds: Normal breath sounds. No wheezing or rales.   Abdominal:      General: Bowel sounds are " "normal.      Palpations: Abdomen is soft. There is no mass.      Tenderness: There is no abdominal tenderness.   Musculoskeletal:         General: Normal range of motion.      Cervical back: Normal range of motion and neck supple.   Lymphadenopathy:      Cervical: No cervical adenopathy.   Skin:     General: Skin is warm and dry.   Neurological:      Mental Status: He is alert and oriented to person, place, and time.      Deep Tendon Reflexes: Reflexes are normal and symmetric.           Diagnostic Test Results:  Labs reviewed in Epic  No results found for this or any previous visit (from the past 24 hour(s)).    ASSESSMENT/PLAN:       ICD-10-CM    1. Routine general medical examination at a health care facility  Z00.00    2. Type 2 diabetes mellitus without complication, without long-term current use of insulin (H)  E11.9 CBC with Platelets & Differential     Comprehensive metabolic panel     Lipid Profile     TSH with free T4 reflex     Hemoglobin A1c   3. Disruptive behavior disorder  F91.9    4. Mental retardation  F70    5. Chronic seasonal allergic rhinitis  J30.2    6. Gastroesophageal reflux disease without esophagitis  K21.9    7. Slow transit constipation  K59.01    8. BPH (begnign prostatic hyperplasia)  N40.0        Patient has been advised of split billing requirements and indicates understanding: Yes  COUNSELING:   Reviewed preventive health counseling, as reflected in patient instructions    Estimated body mass index is 33.47 kg/m  as calculated from the following:    Height as of this encounter: 1.746 m (5' 8.75\").    Weight as of this encounter: 102.1 kg (225 lb).     Weight management plan: Discussed healthy diet and exercise guidelines    He reports that he has never smoked. He has never used smokeless tobacco.      Counseling Resources:  ATP IV Guidelines  Pooled Cohorts Equation Calculator  FRAX Risk Assessment  ICSI Preventive Guidelines  Dietary Guidelines for Americans, 2010  USDA's " MyPlate  ASA Prophylaxis  Lung CA Screening    Berny Velasquez MD  [facility]

## 2021-09-02 ASSESSMENT — ASTHMA QUESTIONNAIRES: ACT_TOTALSCORE: 25

## 2021-09-03 DIAGNOSIS — F84.0 AUTISM: ICD-10-CM

## 2021-09-03 DIAGNOSIS — Z79.899 DRUG THERAPY: Primary | ICD-10-CM

## 2021-09-04 DIAGNOSIS — E11.9 TYPE 2 DIABETES MELLITUS WITHOUT COMPLICATION, WITHOUT LONG-TERM CURRENT USE OF INSULIN (H): ICD-10-CM

## 2021-09-04 DIAGNOSIS — K59.01 SLOW TRANSIT CONSTIPATION: ICD-10-CM

## 2021-09-07 DIAGNOSIS — K59.01 SLOW TRANSIT CONSTIPATION: ICD-10-CM

## 2021-09-08 ENCOUNTER — LAB (OUTPATIENT)
Dept: LAB | Facility: OTHER | Age: 41
End: 2021-09-08
Payer: MEDICARE

## 2021-09-08 DIAGNOSIS — Z79.899 DRUG THERAPY: ICD-10-CM

## 2021-09-08 DIAGNOSIS — F84.0 AUTISM: ICD-10-CM

## 2021-09-08 LAB
ALBUMIN SERPL-MCNC: 3.9 G/DL (ref 3.4–5)
ALP SERPL-CCNC: 37 U/L (ref 40–150)
ALT SERPL W P-5'-P-CCNC: 44 U/L (ref 0–70)
ANION GAP SERPL CALCULATED.3IONS-SCNC: 6 MMOL/L (ref 3–14)
AST SERPL W P-5'-P-CCNC: 23 U/L (ref 0–45)
BASOPHILS # BLD AUTO: 0 10E3/UL (ref 0–0.2)
BASOPHILS NFR BLD AUTO: 1 %
BILIRUB SERPL-MCNC: 0.9 MG/DL (ref 0.2–1.3)
BUN SERPL-MCNC: 13 MG/DL (ref 7–30)
CALCIUM SERPL-MCNC: 9 MG/DL (ref 8.5–10.1)
CHLORIDE BLD-SCNC: 108 MMOL/L (ref 94–109)
CHOLEST SERPL-MCNC: 82 MG/DL
CO2 SERPL-SCNC: 22 MMOL/L (ref 20–32)
CREAT SERPL-MCNC: 0.69 MG/DL (ref 0.66–1.25)
EOSINOPHIL # BLD AUTO: 0.1 10E3/UL (ref 0–0.7)
EOSINOPHIL NFR BLD AUTO: 2 %
ERYTHROCYTE [DISTWIDTH] IN BLOOD BY AUTOMATED COUNT: 12.6 % (ref 10–15)
EST. AVERAGE GLUCOSE BLD GHB EST-MCNC: 103 MG/DL
FASTING STATUS PATIENT QL REPORTED: YES
GFR SERPL CREATININE-BSD FRML MDRD: >90 ML/MIN/1.73M2
GLUCOSE BLD-MCNC: 85 MG/DL (ref 70–99)
HBA1C MFR BLD: 5.2 % (ref 0–5.6)
HCT VFR BLD AUTO: 46 % (ref 40–53)
HDLC SERPL-MCNC: 39 MG/DL
HGB BLD-MCNC: 16.2 G/DL (ref 13.3–17.7)
IMM GRANULOCYTES # BLD: 0 10E3/UL
IMM GRANULOCYTES NFR BLD: 0 %
LDLC SERPL CALC-MCNC: 13 MG/DL
LYMPHOCYTES # BLD AUTO: 2.2 10E3/UL (ref 0.8–5.3)
LYMPHOCYTES NFR BLD AUTO: 47 %
MCH RBC QN AUTO: 30.2 PG (ref 26.5–33)
MCHC RBC AUTO-ENTMCNC: 35.2 G/DL (ref 31.5–36.5)
MCV RBC AUTO: 86 FL (ref 78–100)
MONOCYTES # BLD AUTO: 0.4 10E3/UL (ref 0–1.3)
MONOCYTES NFR BLD AUTO: 8 %
NEUTROPHILS # BLD AUTO: 2 10E3/UL (ref 1.6–8.3)
NEUTROPHILS NFR BLD AUTO: 42 %
NONHDLC SERPL-MCNC: 43 MG/DL
NRBC # BLD AUTO: 0 10E3/UL
NRBC BLD AUTO-RTO: 0 /100
PLAT MORPH BLD: NORMAL
PLATELET # BLD AUTO: 140 10E3/UL (ref 150–450)
POTASSIUM BLD-SCNC: 4.2 MMOL/L (ref 3.4–5.3)
PROT SERPL-MCNC: 7.4 G/DL (ref 6.8–8.8)
RBC # BLD AUTO: 5.37 10E6/UL (ref 4.4–5.9)
RBC MORPH BLD: NORMAL
SODIUM SERPL-SCNC: 136 MMOL/L (ref 133–144)
TRIGL SERPL-MCNC: 150 MG/DL
VALPROATE SERPL-MCNC: 86 MG/L
WBC # BLD AUTO: 4.7 10E3/UL (ref 4–11)

## 2021-09-08 PROCEDURE — 82465 ASSAY BLD/SERUM CHOLESTEROL: CPT | Mod: ZL

## 2021-09-08 PROCEDURE — 80164 ASSAY DIPROPYLACETIC ACD TOT: CPT | Mod: ZL

## 2021-09-08 PROCEDURE — 83036 HEMOGLOBIN GLYCOSYLATED A1C: CPT | Mod: ZL

## 2021-09-08 PROCEDURE — 85025 COMPLETE CBC W/AUTO DIFF WBC: CPT | Mod: ZL

## 2021-09-08 PROCEDURE — 36415 COLL VENOUS BLD VENIPUNCTURE: CPT | Mod: ZL

## 2021-09-08 PROCEDURE — 82040 ASSAY OF SERUM ALBUMIN: CPT | Mod: ZL

## 2021-09-08 PROCEDURE — 82306 VITAMIN D 25 HYDROXY: CPT | Mod: ZL

## 2021-09-08 RX ORDER — DOCUSATE SODIUM 50MG AND SENNOSIDES 8.6MG 8.6; 5 MG/1; MG/1
TABLET, FILM COATED ORAL
Qty: 100 TABLET | Refills: 11 | Status: SHIPPED | OUTPATIENT
Start: 2021-09-08 | End: 2022-10-04

## 2021-09-08 RX ORDER — POLYETHYLENE GLYCOL 3350 17 G/17G
POWDER, FOR SOLUTION ORAL
Qty: 510 G | Refills: 3 | Status: SHIPPED | OUTPATIENT
Start: 2021-09-08 | End: 2022-02-22

## 2021-09-08 RX ORDER — ATORVASTATIN CALCIUM 40 MG/1
40 TABLET, FILM COATED ORAL DAILY
Qty: 90 TABLET | Refills: 3 | Status: SHIPPED | OUTPATIENT
Start: 2021-09-08 | End: 2022-08-18

## 2021-09-08 RX ORDER — LISINOPRIL 5 MG/1
5 TABLET ORAL DAILY
Qty: 90 TABLET | Refills: 3 | Status: SHIPPED | OUTPATIENT
Start: 2021-09-08 | End: 2022-08-18

## 2021-09-09 LAB — DEPRECATED CALCIDIOL+CALCIFEROL SERPL-MC: 45 UG/L (ref 20–75)

## 2021-09-22 DIAGNOSIS — Z79.899 DRUG THERAPY: Primary | ICD-10-CM

## 2021-09-23 PROCEDURE — 93010 ELECTROCARDIOGRAM REPORT: CPT | Performed by: INTERNAL MEDICINE

## 2021-09-23 PROCEDURE — 93005 ELECTROCARDIOGRAM TRACING: CPT

## 2021-10-07 DIAGNOSIS — E55.9 VITAMIN D DEFICIENCY: ICD-10-CM

## 2021-10-07 RX ORDER — CHOLECALCIFEROL (VITAMIN D3) 50 MCG
TABLET ORAL
Qty: 100 TABLET | Refills: 1 | Status: SHIPPED | OUTPATIENT
Start: 2021-10-07 | End: 2022-07-20

## 2021-10-07 NOTE — TELEPHONE ENCOUNTER
Vitamin D3  Last Written Prescription Date: 6/15/21  Last Fill Quantity: 100 # of Refills: 1  Last Office Visit: 9/1/21

## 2021-11-01 ENCOUNTER — TELEPHONE (OUTPATIENT)
Dept: FAMILY MEDICINE | Facility: OTHER | Age: 41
End: 2021-11-01

## 2021-11-01 NOTE — TELEPHONE ENCOUNTER
1:33 PM    Reason for Call: OVERBOOK    Patient is having the following symptoms: BEHAVIOR ISSUES for 3+ days.    The patient is requesting an appointment for ASAP with DR SMITH.    Was an appointment offered for this call? Yes  If yes : Appointment type              Date 12/08/2021 DECLINED    Preferred method for responding to this message: Telephone Call  What is your phone number ? [phone]    If we cannot reach you directly, may we leave a detailed response at the number you provided? Yes    Can this message wait until your PCP/provider returns, if unavailable today? YES, PROVIDER IS NOT IN TODAY    Ailyn Johnson

## 2021-11-01 NOTE — TELEPHONE ENCOUNTER
Next 5 appointments (look out 90 days)      Nov 04, 2021  9:45 AM  (Arrive by 9:30 AM)  SHORT with Edwina Washburn NP  [facility] [place] ([facility] [place] ) [address]  [place] [address]  [phone]          Patient aware of appointment date and time

## 2021-11-03 DIAGNOSIS — J30.2 CHRONIC SEASONAL ALLERGIC RHINITIS: ICD-10-CM

## 2021-11-03 NOTE — PROGRESS NOTES
Assessment & Plan     I contacted Azucena Cho NP who work as a mental health practitioner here. She recommended that I reach out to Dr. Michael Chaudhry at [facility] who Casey has been seeing. I reached out to Dr. Chaudhry's office and he will not talk to me as STAR  in .     I called Carly, supervisor at [facility] to discuss plan of care. I will increase his 3 pm dose of Ativan to 2 mg as his behaviors are most prevalent around 5-6 pm. Carly will ask guardian to sign STAR for Dr. Chaudhry.       (F70) Mental retardation  Comment: check labs   Plan: CBC with platelets and differential            (H61.23) Bilateral impacted cerumen  Comment: ref for cerumen removal   Plan: Otolaryngology Referral            (J01.00) Acute maxillary sinusitis, recurrence not specified  Comment: treat for a presumed sinus infection  Plan: amoxicillin (AMOXIL) 875 MG tablet           (K59.09) Chronic constipation  Comment: check abdominal XRAY to rule out constipation as reason for destructive behaviors   Plan: HC XRAY ABDOMEN, 2 VIEWS              (F91.9) Disruptive behavior disorder  (primary encounter diagnosis)  Comment: check labs, UA, and abdominal XRAY   Plan: UA reflex to Microscopic and Culture - [place],        CBC with platelets and differential,         Comprehensive metabolic panel, HC XRAY ABDOMEN,        2 VIEWS, LORazepam (ATIVAN) 1 MG tablet            (F84.0) Autism  Comment: check labs   Plan: CBC with platelets and differential, LORazepam         (ATIVAN) 1 MG tablet            See Patient Instructions    Return if symptoms worsen or fail to improve.    Edwina Washburn NP  [facility] [place]    Subjective   Casey is a 41 year old who presents for the following health issues  accompanied by his [facility] staff member.    HPI     Concern - Behavior Issues. Self inflicted behaviors, property destruction  Onset: 2-3 weeks  Description: hurts self, destroys property, hurts others on  occassion  Intensity: moderate, severe  Progression of Symptoms:  worsening  Accompanying Signs & Symptoms: destructive behaviors  Previous history of similar problem: yes  Precipitating factors:        Worsened by: unknown, happens out of the blue, loud noises, would like to see if her has a underlying condition like having a ear infection ect.   Alleviating factors:        Improved by: medication   Therapies tried and outcome: medications, after having a behavior he feels bad.     He is sleeping well at night.     Behaviors are most prevalent around super time.       Review of Systems   Constitutional, HEENT, cardiovascular, pulmonary, GI, , musculoskeletal, neuro, skin, endocrine and psych systems are negative, except as otherwise noted.      Objective    /68   Pulse 104   Temp 96.8  F (36  C) (Tympanic)   Resp 18   Wt 104.8 kg (231 lb)   SpO2 98%   BMI 34.36 kg/m    Body mass index is 34.36 kg/m .  Physical Exam   GENERAL: healthy, alert and no distress  HENT: +bilateral ear canals with cerumen impaction. +bilateral nasal turbinates with erythema and slight enlargement. Mouth without ulcers or lesions   NECK: no adenopathy, no asymmetry, masses, or scars and thyroid normal to palpation  RESP: lungs clear to auscultation - no rales, rhonchi or wheezes  CV: regular rate and rhythm, normal S1 S2, no S3 or S4, no murmur, click or rub, no peripheral edema and peripheral pulses strong  ABDOMEN: soft, nontender, no hepatosplenomegaly, no masses and bowel sounds hyperactive    (male): normal male genitalia without lesions or urethral discharge, no hernia  MS: no gross musculoskeletal defects noted, no edema  SKIN: no suspicious lesions or rashes  NEURO: Normal strength and tone, mentation intact and speech normal  PSYCH: speaks a few words    Results for orders placed or performed in visit on 11/04/21   XR ABDOMEN 2 VW (Clinic Performed)     Status: None    Narrative    PROCEDURE: XR ABDOMEN 2VIEWS  11/4/2021 11:15 AM    HISTORY: Disruptive behavior disorder; Chronic constipation    COMPARISONS: None.    TECHNIQUE: Supine and upright views.    FINDINGS: There is no free intraperitoneal air. There is a fairly  large amount of gas and stool within the colon to the level of the  rectum. Gas-filled small bowel loops in the right side of the abdomen  are probably small bowel loops. At least one of these is moderately  dilated. There are short gas fluid levels on the upright view. No mass  or suspicious calcification is seen.         Impression    IMPRESSION: Nonspecific bowel gas pattern with mildly dilated right  lower quadrant small bowel loops.    KHRIS HUGHES MD         SYSTEM ID:  [id number]   Results for orders placed or performed in visit on 11/04/21   UA reflex to Microscopic and Culture - [place]     Status: Normal    Specimen: Urine, Midstream   Result Value Ref Range    Color Urine Light Yellow Colorless, Straw, Light Yellow, Yellow    Appearance Urine Clear Clear    Glucose Urine Negative Negative mg/dL    Bilirubin Urine Negative Negative    Ketones Urine Negative Negative mg/dL    Specific Gravity Urine 1.015 1.003 - 1.035    Blood Urine Negative Negative    pH Urine 6.0 4.7 - 8.0    Protein Albumin Urine Negative Negative mg/dL    Urobilinogen Urine 2.0 Normal, 2.0 mg/dL    Nitrite Urine Negative Negative    Leukocyte Esterase Urine Negative Negative    Narrative    Microscopic not indicated   Comprehensive metabolic panel     Status: Normal   Result Value Ref Range    Sodium 134 133 - 144 mmol/L    Potassium 4.4 3.4 - 5.3 mmol/L    Chloride 102 94 - 109 mmol/L    Carbon Dioxide (CO2) 26 20 - 32 mmol/L    Anion Gap 6 3 - 14 mmol/L    Urea Nitrogen 7 7 - 30 mg/dL    Creatinine 0.80 0.66 - 1.25 mg/dL    Calcium 8.7 8.5 - 10.1 mg/dL    Glucose 92 70 - 99 mg/dL    Alkaline Phosphatase 44 40 - 150 U/L    AST 17 0 - 45 U/L    ALT 39 0 - 70 U/L    Protein Total 7.3 6.8 - 8.8 g/dL    Albumin 4.1 3.4 - 5.0  g/dL    Bilirubin Total 0.6 0.2 - 1.3 mg/dL    GFR Estimate >90 >60 mL/min/1.73m2   CBC with platelets and differential     Status: None   Result Value Ref Range    WBC Count 4.7 4.0 - 11.0 10e3/uL    RBC Count 5.41 4.40 - 5.90 10e6/uL    Hemoglobin 16.1 13.3 - 17.7 g/dL    Hematocrit 45.7 40.0 - 53.0 %    MCV 85 78 - 100 fL    MCH 29.8 26.5 - 33.0 pg    MCHC 35.2 31.5 - 36.5 g/dL    RDW 12.3 10.0 - 15.0 %    Platelet Count 151 150 - 450 10e3/uL    % Neutrophils 53 %    % Lymphocytes 34 %    % Monocytes 11 %    % Eosinophils 2 %    % Basophils 0 %    % Immature Granulocytes 0 %    NRBCs per 100 WBC 0 <1 /100    Absolute Neutrophils 2.4 1.6 - 8.3 10e3/uL    Absolute Lymphocytes 1.6 0.8 - 5.3 10e3/uL    Absolute Monocytes 0.5 0.0 - 1.3 10e3/uL    Absolute Eosinophils 0.1 0.0 - 0.7 10e3/uL    Absolute Basophils 0.0 0.0 - 0.2 10e3/uL    Absolute Immature Granulocytes 0.0 <=0.0 10e3/uL    Absolute NRBCs 0.0 10e3/uL   CBC with platelets and differential     Status: None    Narrative    The following orders were created for panel order CBC with platelets and differential.  Procedure                               Abnormality         Status                     ---------                               -----------         ------                     CBC with platelets and d...[[id number]]                      Final result                 Please view results for these tests on the individual orders.

## 2021-11-04 ENCOUNTER — ANCILLARY PROCEDURE (OUTPATIENT)
Dept: GENERAL RADIOLOGY | Facility: OTHER | Age: 41
End: 2021-11-04
Attending: NURSE PRACTITIONER
Payer: MEDICARE

## 2021-11-04 ENCOUNTER — OFFICE VISIT (OUTPATIENT)
Dept: FAMILY MEDICINE | Facility: OTHER | Age: 41
End: 2021-11-04
Attending: NURSE PRACTITIONER
Payer: MEDICARE

## 2021-11-04 VITALS
SYSTOLIC BLOOD PRESSURE: 104 MMHG | DIASTOLIC BLOOD PRESSURE: 68 MMHG | WEIGHT: 231 LBS | RESPIRATION RATE: 18 BRPM | OXYGEN SATURATION: 98 % | HEART RATE: 104 BPM | BODY MASS INDEX: 34.36 KG/M2 | TEMPERATURE: 96.8 F

## 2021-11-04 DIAGNOSIS — H61.23 BILATERAL IMPACTED CERUMEN: ICD-10-CM

## 2021-11-04 DIAGNOSIS — F91.9 DISRUPTIVE BEHAVIOR DISORDER: ICD-10-CM

## 2021-11-04 DIAGNOSIS — F91.9 DISRUPTIVE BEHAVIOR DISORDER: Primary | ICD-10-CM

## 2021-11-04 DIAGNOSIS — F84.0 AUTISM: ICD-10-CM

## 2021-11-04 DIAGNOSIS — K59.09 CHRONIC CONSTIPATION: ICD-10-CM

## 2021-11-04 DIAGNOSIS — J01.00 ACUTE MAXILLARY SINUSITIS, RECURRENCE NOT SPECIFIED: ICD-10-CM

## 2021-11-04 DIAGNOSIS — F70 MILD INTELLECTUAL DISABILITY: ICD-10-CM

## 2021-11-04 LAB
ALBUMIN SERPL-MCNC: 4.1 G/DL (ref 3.4–5)
ALBUMIN UR-MCNC: NEGATIVE MG/DL
ALP SERPL-CCNC: 44 U/L (ref 40–150)
ALT SERPL W P-5'-P-CCNC: 39 U/L (ref 0–70)
ANION GAP SERPL CALCULATED.3IONS-SCNC: 6 MMOL/L (ref 3–14)
APPEARANCE UR: CLEAR
AST SERPL W P-5'-P-CCNC: 17 U/L (ref 0–45)
BASOPHILS # BLD AUTO: 0 10E3/UL (ref 0–0.2)
BASOPHILS NFR BLD AUTO: 0 %
BILIRUB SERPL-MCNC: 0.6 MG/DL (ref 0.2–1.3)
BILIRUB UR QL STRIP: NEGATIVE
BUN SERPL-MCNC: 7 MG/DL (ref 7–30)
CALCIUM SERPL-MCNC: 8.7 MG/DL (ref 8.5–10.1)
CHLORIDE BLD-SCNC: 102 MMOL/L (ref 94–109)
CO2 SERPL-SCNC: 26 MMOL/L (ref 20–32)
COLOR UR AUTO: NORMAL
CREAT SERPL-MCNC: 0.8 MG/DL (ref 0.66–1.25)
EOSINOPHIL # BLD AUTO: 0.1 10E3/UL (ref 0–0.7)
EOSINOPHIL NFR BLD AUTO: 2 %
ERYTHROCYTE [DISTWIDTH] IN BLOOD BY AUTOMATED COUNT: 12.3 % (ref 10–15)
GFR SERPL CREATININE-BSD FRML MDRD: >90 ML/MIN/1.73M2
GLUCOSE BLD-MCNC: 92 MG/DL (ref 70–99)
GLUCOSE UR STRIP-MCNC: NEGATIVE MG/DL
HCT VFR BLD AUTO: 45.7 % (ref 40–53)
HGB BLD-MCNC: 16.1 G/DL (ref 13.3–17.7)
HGB UR QL STRIP: NEGATIVE
IMM GRANULOCYTES # BLD: 0 10E3/UL
IMM GRANULOCYTES NFR BLD: 0 %
KETONES UR STRIP-MCNC: NEGATIVE MG/DL
LEUKOCYTE ESTERASE UR QL STRIP: NEGATIVE
LYMPHOCYTES # BLD AUTO: 1.6 10E3/UL (ref 0.8–5.3)
LYMPHOCYTES NFR BLD AUTO: 34 %
MCH RBC QN AUTO: 29.8 PG (ref 26.5–33)
MCHC RBC AUTO-ENTMCNC: 35.2 G/DL (ref 31.5–36.5)
MCV RBC AUTO: 85 FL (ref 78–100)
MONOCYTES # BLD AUTO: 0.5 10E3/UL (ref 0–1.3)
MONOCYTES NFR BLD AUTO: 11 %
NEUTROPHILS # BLD AUTO: 2.4 10E3/UL (ref 1.6–8.3)
NEUTROPHILS NFR BLD AUTO: 53 %
NITRATE UR QL: NEGATIVE
NRBC # BLD AUTO: 0 10E3/UL
NRBC BLD AUTO-RTO: 0 /100
PH UR STRIP: 6 [PH] (ref 4.7–8)
PLATELET # BLD AUTO: 151 10E3/UL (ref 150–450)
POTASSIUM BLD-SCNC: 4.4 MMOL/L (ref 3.4–5.3)
PROT SERPL-MCNC: 7.3 G/DL (ref 6.8–8.8)
RBC # BLD AUTO: 5.41 10E6/UL (ref 4.4–5.9)
SODIUM SERPL-SCNC: 134 MMOL/L (ref 133–144)
SP GR UR STRIP: 1.01 (ref 1–1.03)
UROBILINOGEN UR STRIP-MCNC: 2 MG/DL
WBC # BLD AUTO: 4.7 10E3/UL (ref 4–11)

## 2021-11-04 PROCEDURE — 82247 BILIRUBIN TOTAL: CPT | Mod: ZL | Performed by: NURSE PRACTITIONER

## 2021-11-04 PROCEDURE — 82040 ASSAY OF SERUM ALBUMIN: CPT | Mod: ZL | Performed by: NURSE PRACTITIONER

## 2021-11-04 PROCEDURE — 81003 URINALYSIS AUTO W/O SCOPE: CPT | Mod: ZL | Performed by: NURSE PRACTITIONER

## 2021-11-04 PROCEDURE — 85041 AUTOMATED RBC COUNT: CPT | Mod: ZL | Performed by: NURSE PRACTITIONER

## 2021-11-04 PROCEDURE — 36415 COLL VENOUS BLD VENIPUNCTURE: CPT | Mod: ZL | Performed by: NURSE PRACTITIONER

## 2021-11-04 PROCEDURE — G0463 HOSPITAL OUTPT CLINIC VISIT: HCPCS

## 2021-11-04 PROCEDURE — 99213 OFFICE O/P EST LOW 20 MIN: CPT | Performed by: NURSE PRACTITIONER

## 2021-11-04 PROCEDURE — 74019 RADEX ABDOMEN 2 VIEWS: CPT | Mod: TC

## 2021-11-04 RX ORDER — CETIRIZINE HYDROCHLORIDE 10 MG/1
TABLET ORAL
Qty: 90 TABLET | Refills: 0 | Status: SHIPPED | OUTPATIENT
Start: 2021-11-04 | End: 2022-02-02

## 2021-11-04 RX ORDER — RISPERIDONE 0.5 MG/1
TABLET ORAL
COMMUNITY
Start: 2021-10-22

## 2021-11-04 RX ORDER — LORAZEPAM 1 MG/1
TABLET ORAL
Qty: 90 TABLET | Refills: 0 | Status: SHIPPED | OUTPATIENT
Start: 2021-11-04 | End: 2022-01-04

## 2021-11-04 RX ORDER — OLANZAPINE 10 MG/1
10 TABLET, ORALLY DISINTEGRATING ORAL EVERY 4 HOURS PRN
COMMUNITY
Start: 2021-09-02

## 2021-11-04 RX ORDER — OLANZAPINE 5 MG/1
5 TABLET ORAL
COMMUNITY

## 2021-11-04 RX ORDER — AMOXICILLIN 875 MG
875 TABLET ORAL 2 TIMES DAILY
Qty: 14 TABLET | Refills: 0 | Status: SHIPPED | OUTPATIENT
Start: 2021-11-04 | End: 2021-11-11

## 2021-11-04 ASSESSMENT — PAIN SCALES - GENERAL: PAINLEVEL: NO PAIN (0)

## 2021-11-04 NOTE — NURSING NOTE
"Chief Complaint   Patient presents with     Behavioral Problem       Initial /68   Pulse 104   Temp 96.8  F (36  C) (Tympanic)   Resp 18   Wt 104.8 kg (231 lb)   SpO2 98%   BMI 34.36 kg/m   Estimated body mass index is 34.36 kg/m  as calculated from the following:    Height as of 9/1/21: 1.746 m (5' 8.75\").    Weight as of this encounter: 104.8 kg (231 lb).  Medication Reconciliation: complete  Lianne Sanchez LPN    "

## 2021-11-04 NOTE — PATIENT INSTRUCTIONS
"Patient Education     Biting    Children who bite others cause a great deal of concern for the parents. The parents of the child who has been bitten are also usually very concerned about infection. Biting is an unacceptable behavior that needs to be stopped at an early age to prevent it from happening again.  Why young children bite  Biting is fairly common in young children, and it's often worrisome to adults. A family member, playmate, or classmate at  or  may be the one bitten. Biting can be painful and frightening when it happens. It upsets other children and often angers teachers and other adults.  Biting is usually caused by 1 of 4 different factors, including the following:  Experimental biting  Experimental biting is done by infants and toddlers as they explore their world. They put everything in their mouths and sometimes bite in the process. You can help decrease biting by telling them, \"No--biting hurts!\" and being firm. Offer them things that they can safely bite on such as teething rings.  Frustration biting  Frustration biting happens when young children become frustrated and unable to cope with a situation. Until they learn how to play cooperatively, they may respond to the demands of other children by hitting or biting. Some helpful guidelines for decreasing this type of biting include:    Keep playtimes short and groups small.    Supervise young children's play closely. Try to recognize frustration and intervene before the biting happens.    If biting happens, say, \"No, don't bite. Biting hurts.\" and remove your child from the situation right away. Stay with your child and help him or her to calm down. Explore other, better ways to handle the situation with your child, so he or she learns to handle emotions differently next time.  Powerless biting  Powerless biting happens when a child is in need of feeling powerful. Sometimes, the youngest child in the family uses biting to gain " "power. To help prevent this type of biting:    Make sure your child feels protected and is not always being \"picked on\" by others.    Explain the situation to bigger or older children and get their help to make things more equal.    If biting happens, tell your child that he or she is not to bite and remove him or her from the situation right away. Stay with your child and help him or her to calm down. Explore other, better ways to handle the situation with your child, so he or she learns to handle emotions differently next time.  Stressful biting  Stressful biting is done when a child is under a lot of emotional stress. Biting may be a sign of distress or pain when the child is upset or angry. If this occurs:    Try to find out what is bothering your child. Watch for what happens right before the biting happens.    Help your child to find other ways to express his or her feelings. Let him or her know that biting is wrong and remove him or her from the situation right away.  If your child bites, respond firmly, but calmly, to the biting. Let your child know that you disapprove and remove him or her from the situation. Help your child learn new ways to handle things. If your child bites repeatedly, be sure to talk with your child's healthcare provider about the problem.  What do I do if my child is biting others?  While every child is different, the following are some recommendations that may be used to help with the child who bites:    Be firm. Tell your child that you will not accept biting and why. Tell him or her biting hurts others.    Offer another behavior the child may use instead of biting. If the child bites because he or she is angry, have the child come to you and tell you this instead. A child who is younger than 18 months may need a toy that is allowed to be chewed on.    If you catch your child biting, use a firm \"no\" to stop the behavior, or try to stop the child before the biting actually " happens.    Use time-out if your child bites, or take away a favorite toy or activity.    Don't bite your child for biting someone else. This teaches your child that biting is still acceptable. Don't bite your child in a playful manner, as this might teach him or her to bite others.    Give praise when your child does not bite.  Sanjiv last reviewed this educational content on 11/1/2018 2000-2021 [employer]. All rights reserved. This information is not intended as a substitute for professional medical care. Always follow your healthcare professional's instructions.

## 2021-11-04 NOTE — TELEPHONE ENCOUNTER
Zyrtec       Last Written Prescription Date:  7/13/2021  Last Fill Quantity: 90,   # refills: 0  Last Office Visit: 9/1/2021  Future Office visit:

## 2021-11-15 ENCOUNTER — OFFICE VISIT (OUTPATIENT)
Dept: OTOLARYNGOLOGY | Facility: OTHER | Age: 41
End: 2021-11-15
Attending: NURSE PRACTITIONER
Payer: MEDICARE

## 2021-11-15 VITALS
HEART RATE: 88 BPM | SYSTOLIC BLOOD PRESSURE: 100 MMHG | DIASTOLIC BLOOD PRESSURE: 58 MMHG | TEMPERATURE: 97 F | HEIGHT: 72 IN | WEIGHT: 212 LBS | OXYGEN SATURATION: 99 % | BODY MASS INDEX: 28.71 KG/M2

## 2021-11-15 DIAGNOSIS — J30.2 SEASONAL ALLERGIC RHINITIS, UNSPECIFIED TRIGGER: ICD-10-CM

## 2021-11-15 DIAGNOSIS — H61.23 BILATERAL IMPACTED CERUMEN: ICD-10-CM

## 2021-11-15 DIAGNOSIS — R06.83 SNORING: ICD-10-CM

## 2021-11-15 DIAGNOSIS — J32.9 RECURRENT SINUSITIS: Primary | ICD-10-CM

## 2021-11-15 PROCEDURE — G0463 HOSPITAL OUTPT CLINIC VISIT: HCPCS

## 2021-11-15 PROCEDURE — 31231 NASAL ENDOSCOPY DX: CPT | Performed by: NURSE PRACTITIONER

## 2021-11-15 PROCEDURE — 99213 OFFICE O/P EST LOW 20 MIN: CPT | Mod: 25 | Performed by: NURSE PRACTITIONER

## 2021-11-15 PROCEDURE — 69210 REMOVE IMPACTED EAR WAX UNI: CPT | Mod: 50 | Performed by: NURSE PRACTITIONER

## 2021-11-15 PROCEDURE — 69210 REMOVE IMPACTED EAR WAX UNI: CPT | Performed by: NURSE PRACTITIONER

## 2021-11-15 ASSESSMENT — MIFFLIN-ST. JEOR: SCORE: 1906.69

## 2021-11-15 ASSESSMENT — PAIN SCALES - GENERAL: PAINLEVEL: MILD PAIN (2)

## 2021-11-15 NOTE — PATIENT INSTRUCTIONS
Thank you for allowing Marisol Garcia and our ENT team to participate in your care.  If your medications are too expensive, please give the nurse a call.  We can possibly change this medication.  If you have a scheduling or an appointment question please contact our Health Unit Coordinator at their direct line [phone].   ALL nursing questions or concerns can be directed to your ENT nurse at: [phone] - [facility]     Ordered CT of the sinus, someone will call you to schedule that appointment     Ordered an environmental RAST allergy test in our lab

## 2021-11-15 NOTE — NURSING NOTE
"Chief Complaint   Patient presents with     Cerumen Impaction     Bilateral        Initial /58 (BP Location: Right arm, Cuff Size: Adult Regular)   Pulse 88   Temp 97  F (36.1  C) (Tympanic)   Ht 1.816 m (5' 11.5\")   Wt 96.2 kg (212 lb)   SpO2 99%   BMI 29.16 kg/m   Estimated body mass index is 29.16 kg/m  as calculated from the following:    Height as of this encounter: 1.816 m (5' 11.5\").    Weight as of this encounter: 96.2 kg (212 lb).  Medication Reconciliation: complete  Madalyn Elizalde LPN    "

## 2021-11-15 NOTE — LETTER
11/15/2021         RE: Casey Pedro  [address]  [facility] [address]        Dear Colleague,    Thank you for referring your patient, Casey Pedro, to the [facility]. Please see a copy of my visit note below.    Otolaryngology Note         Chief Complaint:     Patient presents with:  Cerumen Impaction: Bilateral            History of Present Illness:     Casey Perdo is a 41 year old male who presents today for ear cleaning.      Casey has concerns with sinus pain and pressure. He has sinus infections approximately 2-3 times per year.  He complains of sinus pain and pressure frequently.  He has frequently has nasal congestion.  He snores off and on.      He is currently on zyrtec and flonase.  He has not tried any nasal rinses.      He was seen by PCP for concerns for agitation.  Bilateral cerumen impaction noted and he was referred here for cleaning.    He lives at [facility] and has been there for about 9 years. He has not had any history of COM, cerumen impaction, ear surgery.    No concerns with hearing.  He gives minimal history.  Unable to get a good assessment for tinnitus  No known vertigo, facial numbness or weakness.      No otalgia, otorrhea.             Medications:     Current Outpatient Rx   Medication Sig Dispense Refill     Alcohol Swabs (B-D SINGLE USE SWABS REGULAR) PADS 1 Application every 4 hours as needed 200 each 11     ASPIRIN LOW DOSE 81 MG EC tablet Take 1 tablet (81 mg) by mouth daily 90 tablet 3     atorvastatin (LIPITOR) 40 MG tablet Take 1 tablet (40 mg) by mouth daily 90 tablet 3     blood glucose (CONTOUR NEXT TEST) test strip Use to test blood sugar 2 times daily or as directed. 50 strip 3     blood glucose (NO BRAND SPECIFIED) test strip Use to test blood sugar 2 times daily or as directed.       blood glucose (NO BRAND SPECIFIED) test strip Use to test blood sugar 2 times daily or as directed.       blood glucose monitoring (NO BRAND SPECIFIED) meter  device kit Use to test blood sugar 2 times daily or as directed. 1 kit 0     cetirizine (ZYRTEC) 10 MG tablet TAKE ONE (1) TABLET BY MOUTH DAILY 90 tablet 0     divalproex sodium delayed-release (DEPAKOTE) 500 MG DR tablet Take 2 tablets (1,000 mg) by mouth 2 times daily 60 tablet 0     fluticasone (FLONASE) 50 MCG/ACT nasal spray Spray 1 spray into both nostrils daily 16 g 5     GAVILAX 17 GM/SCOOP powder Take 17 g (1 capful) by mouth daily 510 g 3     guanFACINE (TENEX) 1 MG tablet Take 1 tablet by mouth twice a day Take one tablet by mouth every day at 8 am and one tablet by mouth every day at 5 pm 60 tablet 0     ipratropium - albuterol 0.5 mg/2.5 mg/3 mL (DUONEB) 0.5-2.5 (3) MG/3ML neb solution Take 1 vial (3 mLs) by nebulization every 4 hours as needed for shortness of breath / dyspnea or wheezing 1 Box 0     lisinopril (ZESTRIL) 5 MG tablet Take 1 tablet (5 mg) by mouth daily 90 tablet 3     LORazepam (ATIVAN) 1 MG tablet Take 1 tablet (1 mg) by mouth daily (with breakfast) AND 2 tablets (2 mg) daily. Take 1 mg at 7:00 AM and take 2 mg at 3:00 PM. 90 tablet 0     metFORMIN (GLUCOPHAGE-XR) 500 MG 24 hr tablet Take 1 tablet (500 mg) by mouth daily (with dinner) 60 tablet 2     Microlet Lancets MISC Use to test blood sugar 2 times daily or as directed. 90 each 10     OLANZapine (ZYPREXA) 20 MG tablet Take 20 mg by mouth daily Take with the 10mg.       OLANZapine (ZYPREXA) 5 MG tablet Take 5 mg by mouth daily (with lunch)       OLANZapine zydis (ZYPREXA) 10 MG ODT TAKE ONE TABLET BY MOUTH TWICE DAILY AS NEEDED FOR ANXIETY Use 4 hours apart and place on top of tongue to dissolve       Omega-3 Fatty Acids (GNP FISH OIL) 1000 MG CAPS TAKE TWO (2) CAPSULES BY MOUTH DAILY 100 capsule 7     omeprazole (PRILOSEC) 40 MG DR capsule Take 1 capsule (40 mg) by mouth two times daily 60 capsule 9     risperiDONE (RISPERDAL) 0.25 MG tablet Take 0.25 mg by mouth 2 times daily  4     risperiDONE (RISPERDAL) 0.5 MG tablet TAKE ONE  "TABLET BY MOUTH TWICE DAILY along with the 0.25 mg       SENEXON-S 8.6-50 MG tablet Take 1 tablet by mouth 2 times daily 100 tablet 11     vitamin D3 (CHOLECALCIFEROL) 50 mcg (2000 units) tablet TAKE ONE TABLET BY MOUTH EVERY DAY (2000UNITS) 100 tablet 1            Allergies:     Allergies: Patient has no known allergies.          Past Medical History:     Past Medical History:   Diagnosis Date     Autism 09/16/2011     BPH 09/16/2011     Diabetes mellitus, type 2 (H)      Mental retardation 09/16/2011            Past Surgical History:     History reviewed. No pertinent surgical history.    ENT family history reviewed         Social History:     Social History     Tobacco Use     Smoking status: Never Smoker     Smokeless tobacco: Never Used     Tobacco comment: no passive exposure   Substance Use Topics     Alcohol use: No     Drug use: No            Review of Systems:     ROS: See HPI         Physical Exam:     /58 (BP Location: Right arm, Cuff Size: Adult Regular)   Pulse 88   Temp 97  F (36.1  C) (Tympanic)   Ht 1.816 m (5' 11.5\")   Wt 96.2 kg (212 lb)   SpO2 99%   BMI 29.16 kg/m      General - The patient is well nourished and well developed, and appears to have good nutritional status.  Alert and oriented to person and place, answers questions and cooperates with examination appropriately.   Head and Face - Normocephalic and atraumatic, with no gross asymmetry noted.  The facial nerve is intact, with strong symmetric movements.  Voice and Breathing - The patient was breathing comfortably without the use of accessory muscles. There was no wheezing, stridor. The patients voice was clear and strong, and had appropriate pitch and quality.  Ears - The ears were examined with binocular microscopy and with otoscope.  External ears normal. Right canal cerumen impacted.  Left canal cerumen impacted.  The right ear was cleaned with #7, #5 suction.   Right tympanic membrane is intact without effusion, " retraction or mass. The left ear was cleaned with #7 suction.  Left tympanic membrane is intact without effusion, retraction or mass.  Mouth - Examination of the oral cavity showed pink, healthy oral mucosa. Dentition in good condition. No lesions or ulcerations noted. The tongue was mobile and midline.   Throat - The walls of the oropharynx were smooth, pink, moist, symmetric, and had no lesions or ulcerations.  The tonsillar pillars and soft palate were symmetric. The uvula was midline on elevation.    Neck - No palpable adenopathy  Nose - External contour is symmetric, no gross deflection or scars.  Nasal mucosa is pink and moist with no abnormal mucus.  The septum and turbinates were evaluated with nasal speculum, no polyps, masses, or purulence noted on examination.    To evaluate the nose and sinuses, I performed rigid nasal endoscopy.  I sprayed both nares with 2 sprays lidocaine and neosynephrine.     I began with the RIGHT side using a 0 degree rigid nasal endoscope, and then similarly examined the LEFT side     Findings:  Slight right deviation  Inferior turbinates:  normal size and shape  Middle turbinate and middle meatus:  No purulence, no polyposis  The superior meatus was examined  No sphenoethmoidal recess purulence or polypoid change.   Mucosa is healthy throughout,  no polyps nor polypoid degeneration  Nasopharynx clear, ET patent, no edema  The patient tolerated the procedure well          Assessment and Plan:       ICD-10-CM    1. Recurrent sinusitis  J32.9    2. Bilateral impacted cerumen  H61.23    3. Snoring  R06.83    4. Seasonal allergic rhinitis, unspecified trigger  J30.2      Complete CT scan sinus  Complete RAST allergy testing  Both ears are clean and look healthy, recommend follow up for Audiogram if any hearing concerns  Follow up will be based on results of allergy testing and CT scan    Marisol WHITMORE  [facility] ENT        Again, thank you for allowing me to  participate in the care of your patient.        Sincerely,        Marisol Garcia, NP

## 2021-11-15 NOTE — PROGRESS NOTES
Otolaryngology Note         Chief Complaint:     Patient presents with:  Cerumen Impaction: Bilateral            History of Present Illness:     Casey Pedro is a 41 year old male who presents today for ear cleaning.      Casey has concerns with sinus pain and pressure. He has sinus infections approximately 2-3 times per year.  He complains of sinus pain and pressure frequently.  He has frequently has nasal congestion.  He snores off and on.      He is currently on zyrtec and flonase.  He has not tried any nasal rinses.      He was seen by PCP for concerns for agitation.  Bilateral cerumen impaction noted and he was referred here for cleaning.    He lives at [facility] and has been there for about 9 years. He has not had any history of COM, cerumen impaction, ear surgery.    No concerns with hearing.  He gives minimal history.  Unable to get a good assessment for tinnitus  No known vertigo, facial numbness or weakness.      No otalgia, otorrhea.             Medications:     Current Outpatient Rx   Medication Sig Dispense Refill     Alcohol Swabs (B-D SINGLE USE SWABS REGULAR) PADS 1 Application every 4 hours as needed 200 each 11     ASPIRIN LOW DOSE 81 MG EC tablet Take 1 tablet (81 mg) by mouth daily 90 tablet 3     atorvastatin (LIPITOR) 40 MG tablet Take 1 tablet (40 mg) by mouth daily 90 tablet 3     blood glucose (CONTOUR NEXT TEST) test strip Use to test blood sugar 2 times daily or as directed. 50 strip 3     blood glucose (NO BRAND SPECIFIED) test strip Use to test blood sugar 2 times daily or as directed.       blood glucose (NO BRAND SPECIFIED) test strip Use to test blood sugar 2 times daily or as directed.       blood glucose monitoring (NO BRAND SPECIFIED) meter device kit Use to test blood sugar 2 times daily or as directed. 1 kit 0     cetirizine (ZYRTEC) 10 MG tablet TAKE ONE (1) TABLET BY MOUTH DAILY 90 tablet 0     divalproex sodium delayed-release (DEPAKOTE) 500 MG DR tablet Take 2 tablets (1,000 mg)  by mouth 2 times daily 60 tablet 0     fluticasone (FLONASE) 50 MCG/ACT nasal spray Spray 1 spray into both nostrils daily 16 g 5     GAVILAX 17 GM/SCOOP powder Take 17 g (1 capful) by mouth daily 510 g 3     guanFACINE (TENEX) 1 MG tablet Take 1 tablet by mouth twice a day Take one tablet by mouth every day at 8 am and one tablet by mouth every day at 5 pm 60 tablet 0     ipratropium - albuterol 0.5 mg/2.5 mg/3 mL (DUONEB) 0.5-2.5 (3) MG/3ML neb solution Take 1 vial (3 mLs) by nebulization every 4 hours as needed for shortness of breath / dyspnea or wheezing 1 Box 0     lisinopril (ZESTRIL) 5 MG tablet Take 1 tablet (5 mg) by mouth daily 90 tablet 3     LORazepam (ATIVAN) 1 MG tablet Take 1 tablet (1 mg) by mouth daily (with breakfast) AND 2 tablets (2 mg) daily. Take 1 mg at 7:00 AM and take 2 mg at 3:00 PM. 90 tablet 0     metFORMIN (GLUCOPHAGE-XR) 500 MG 24 hr tablet Take 1 tablet (500 mg) by mouth daily (with dinner) 60 tablet 2     Microlet Lancets MISC Use to test blood sugar 2 times daily or as directed. 90 each 10     OLANZapine (ZYPREXA) 20 MG tablet Take 20 mg by mouth daily Take with the 10mg.       OLANZapine (ZYPREXA) 5 MG tablet Take 5 mg by mouth daily (with lunch)       OLANZapine zydis (ZYPREXA) 10 MG ODT TAKE ONE TABLET BY MOUTH TWICE DAILY AS NEEDED FOR ANXIETY Use 4 hours apart and place on top of tongue to dissolve       Omega-3 Fatty Acids (GNP FISH OIL) 1000 MG CAPS TAKE TWO (2) CAPSULES BY MOUTH DAILY 100 capsule 7     omeprazole (PRILOSEC) 40 MG DR capsule Take 1 capsule (40 mg) by mouth two times daily 60 capsule 9     risperiDONE (RISPERDAL) 0.25 MG tablet Take 0.25 mg by mouth 2 times daily  4     risperiDONE (RISPERDAL) 0.5 MG tablet TAKE ONE TABLET BY MOUTH TWICE DAILY along with the 0.25 mg       SENEXON-S 8.6-50 MG tablet Take 1 tablet by mouth 2 times daily 100 tablet 11     vitamin D3 (CHOLECALCIFEROL) 50 mcg (2000 units) tablet TAKE ONE TABLET BY MOUTH EVERY DAY (2000UNITS) 100  "tablet 1            Allergies:     Allergies: Patient has no known allergies.          Past Medical History:     Past Medical History:   Diagnosis Date     Autism 09/16/2011     BPH 09/16/2011     Diabetes mellitus, type 2 (H)      Mental retardation 09/16/2011            Past Surgical History:     History reviewed. No pertinent surgical history.    ENT family history reviewed         Social History:     Social History     Tobacco Use     Smoking status: Never Smoker     Smokeless tobacco: Never Used     Tobacco comment: no passive exposure   Substance Use Topics     Alcohol use: No     Drug use: No            Review of Systems:     ROS: See HPI         Physical Exam:     /58 (BP Location: Right arm, Cuff Size: Adult Regular)   Pulse 88   Temp 97  F (36.1  C) (Tympanic)   Ht 1.816 m (5' 11.5\")   Wt 96.2 kg (212 lb)   SpO2 99%   BMI 29.16 kg/m      General - The patient is well nourished and well developed, and appears to have good nutritional status.  Alert and oriented to person and place, answers questions and cooperates with examination appropriately.   Head and Face - Normocephalic and atraumatic, with no gross asymmetry noted.  The facial nerve is intact, with strong symmetric movements.  Voice and Breathing - The patient was breathing comfortably without the use of accessory muscles. There was no wheezing, stridor. The patients voice was clear and strong, and had appropriate pitch and quality.  Ears - The ears were examined with binocular microscopy and with otoscope.  External ears normal. Right canal cerumen impacted.  Left canal cerumen impacted.  The right ear was cleaned with #7, #5 suction.   Right tympanic membrane is intact without effusion, retraction or mass. The left ear was cleaned with #7 suction.  Left tympanic membrane is intact without effusion, retraction or mass.  Mouth - Examination of the oral cavity showed pink, healthy oral mucosa. Dentition in good condition. No lesions or " ulcerations noted. The tongue was mobile and midline.   Throat - The walls of the oropharynx were smooth, pink, moist, symmetric, and had no lesions or ulcerations.  The tonsillar pillars and soft palate were symmetric. The uvula was midline on elevation.    Neck - No palpable adenopathy  Nose - External contour is symmetric, no gross deflection or scars.  Nasal mucosa is pink and moist with no abnormal mucus.  The septum and turbinates were evaluated with nasal speculum, no polyps, masses, or purulence noted on examination.    To evaluate the nose and sinuses, I performed rigid nasal endoscopy.  I sprayed both nares with 2 sprays lidocaine and neosynephrine.     I began with the RIGHT side using a 0 degree rigid nasal endoscope, and then similarly examined the LEFT side     Findings:  Slight right deviation  Inferior turbinates:  normal size and shape  Middle turbinate and middle meatus:  No purulence, no polyposis  The superior meatus was examined  No sphenoethmoidal recess purulence or polypoid change.   Mucosa is healthy throughout,  no polyps nor polypoid degeneration  Nasopharynx clear, ET patent, no edema  The patient tolerated the procedure well          Assessment and Plan:       ICD-10-CM    1. Recurrent sinusitis  J32.9    2. Bilateral impacted cerumen  H61.23    3. Snoring  R06.83    4. Seasonal allergic rhinitis, unspecified trigger  J30.2      Complete CT scan sinus  Complete RAST allergy testing  Both ears are clean and look healthy, recommend follow up for Audiogram if any hearing concerns  Follow up will be based on results of allergy testing and CT scan    Marisol WHITMORE  [facility] ENT

## 2021-11-16 ENCOUNTER — HOSPITAL ENCOUNTER (OUTPATIENT)
Dept: CT IMAGING | Facility: HOSPITAL | Age: 41
Discharge: HOME | End: 2021-11-16
Attending: NURSE PRACTITIONER | Admitting: NURSE PRACTITIONER
Payer: MEDICARE

## 2021-11-16 DIAGNOSIS — R06.83 SNORING: ICD-10-CM

## 2021-11-16 DIAGNOSIS — J30.2 SEASONAL ALLERGIC RHINITIS, UNSPECIFIED TRIGGER: ICD-10-CM

## 2021-11-16 DIAGNOSIS — J32.9 RECURRENT SINUSITIS: ICD-10-CM

## 2021-11-16 PROCEDURE — G1004 CDSM NDSC: HCPCS | Mod: TC

## 2021-11-16 PROCEDURE — 70486 CT MAXILLOFACIAL W/O DYE: CPT | Mod: MG

## 2021-11-16 PROCEDURE — 70486 CT MAXILLOFACIAL W/O DYE: CPT | Mod: TC

## 2021-11-16 PROCEDURE — 82785 ASSAY OF IGE: CPT

## 2021-11-16 PROCEDURE — 86003 ALLG SPEC IGE CRUDE XTRC EA: CPT

## 2021-11-16 PROCEDURE — 36415 COLL VENOUS BLD VENIPUNCTURE: CPT

## 2021-12-06 LAB
SCANNED LAB RESULT: NORMAL
SCANNED LAB RESULT: NORMAL

## 2021-12-08 ENCOUNTER — TELEPHONE (OUTPATIENT)
Dept: OTOLARYNGOLOGY | Facility: OTHER | Age: 41
End: 2021-12-08
Payer: MEDICARE

## 2021-12-30 NOTE — PROGRESS NOTES
" Otolaryngology Progress Note          Casey Pedro is a 41 year old male    Chronic recurrent sinusitis and septal deviation.  He was evaluated by Marisol on 11/15/2021     Accompanied by care takers today, Gerda العراقي with a moderate problem with nasal blockage rhinorrhea and facial pressure    Staff notes behavioral changes such as hitting his face and he complains about facial and nasal pain.  He does occasionally hit himself and staff.    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  Karalis to with polypoid disease extending to the skull base the skull base is intact the lamina is intact the optic nerve is not dehiscent  Bilateral inferior turbinate hypertrophy and janet     Silver 18/24        Physical Exam  /78 (BP Location: Right arm, Patient Position: Sitting, Cuff Size: Adult Large)   Pulse 88   Temp 97.4  F (36.3  C) (Tympanic)   Ht 1.816 m (5' 11.5\")   Wt 96.2 kg (212 lb)   SpO2 99%   BMI 29.16 kg/m    General - The patient is well nourished and well developed, and appears to have good nutritional status.  Alert and oriented to person, interactive.  Tolerates exam.   Head and Face - Normocephalic and atraumatic, with no gross asymmetry noted of the contour of the facial features.  The facial nerve is intact, with strong symmetric movements.  Neck-no palpable lymphadenopathy or thyroid mass.  Trachea is midline.  Eyes - Extraocular movements intact.   Ears- External auditory canals are patent, tympanic membranes are intact without effusion or worrisome retractions   Nose - Nasal mucosa is pink and moist with no abnormal mucus.  The septum was deviated left at floor and right and superior septum " occluding lateral nasal wall on right  Mouth - Examination of the oral cavity shows pink, healthy, moist mucosa.  No lesions or ulceration noted.  The dentition are in good repair.  The tongue is mobile and midline.  Throat - The walls of the oropharynx were smooth, pink, moist, symmetric, and had no lesions or ulcerations.  The tonsillar pillars and soft palate were symmetric.  The uvula was midline on elevation.        To evaluate the nose and sinuses, I performed rigid nasal endoscopy.  I applied topical nasal lidocaine and neosynephrine.    I began with the LEFT side using a 0 degree rigid nasal endoscope, and then similarly examined the RIGHT side    Findings:  Inferior turbinates:  enlarged  Middle turbinate and middle meatus:  No purulence, no polyposis, narrow access right lateral nasal wall  Superior meatus was evaluated  Mucosa is healthy throughout,  No stacy polyps nor polypoid degeneration  Sphenoethmoidal recess without purulence   Nasopharynx did not tolerate viz  The patient tolerated the procedure well      Impression/Plan  Casey Pedro is a 41 year old male    ICD-10-CM    1. Chronic pansinusitis  J32.4 budesonide (PULMICORT) 0.5 MG/2ML neb solution   2. DNS (deviated nasal septum)  J34.2    3. Nasal turbinate hypertrophy  J34.3          Start budesonide irrigations education provided  Continue Flonase  3 month follow-up with repeat endoscopy  If no improvement consider surgery, would need consent from his sisters         consider bilateral endoscopic sinus surgery, septoplasty, turbinate reduction     The risks and complications of sinus surgery were openly discussed.  The potential risks include bleeding, general anesthesia, infection, scar formation, need for additional surgery, septal perforation, and rarely injury to the eye with the possibility of blindness,  injury to the brain, meningitis or other intracranial complications.  Nonsurgical options were discussed including prolonged  antibioitics and/or oral and topical steroids.   Sinus anatomy and sinus disease were reviewed.  CT sinus was reviewed with the patient.  All questions were answered.      Continue all current medications recommended by me or my staff.     Continue budesonide irrigations.  Verbal and written education on use provided.     The importance of budesonide irrigations postoperatively was reinforced.     The correct use of nasal irrigations as prescribed will prevent synechiae and allow for proper recovery of the sinus mucosa.        Total  Fredy, balloons, propel  Excision janet     Risks of oral steroid use were discussed and include psychiatric/mood changes, insomnia, stomach ulcers and potential GI bleeding, blood sugar elevation/worsening diabetes, hip/bone necrosis called avascular necrosis, or bone demineralization.           Elayne Baumann D.O.  Otolaryngology/Head and Neck Surgery  Allergy

## 2022-01-03 ENCOUNTER — OFFICE VISIT (OUTPATIENT)
Dept: OTOLARYNGOLOGY | Facility: OTHER | Age: 42
End: 2022-01-03
Attending: OTOLARYNGOLOGY
Payer: MEDICARE

## 2022-01-03 VITALS
HEIGHT: 72 IN | SYSTOLIC BLOOD PRESSURE: 108 MMHG | BODY MASS INDEX: 28.71 KG/M2 | TEMPERATURE: 97.4 F | HEART RATE: 88 BPM | WEIGHT: 212 LBS | OXYGEN SATURATION: 99 % | DIASTOLIC BLOOD PRESSURE: 78 MMHG

## 2022-01-03 DIAGNOSIS — F84.0 AUTISM: ICD-10-CM

## 2022-01-03 DIAGNOSIS — E11.9 TYPE 2 DIABETES MELLITUS WITHOUT COMPLICATION, WITHOUT LONG-TERM CURRENT USE OF INSULIN (H): ICD-10-CM

## 2022-01-03 DIAGNOSIS — F91.9 DISRUPTIVE BEHAVIOR DISORDER: ICD-10-CM

## 2022-01-03 DIAGNOSIS — J34.3 NASAL TURBINATE HYPERTROPHY: ICD-10-CM

## 2022-01-03 DIAGNOSIS — J32.4 CHRONIC PANSINUSITIS: Primary | ICD-10-CM

## 2022-01-03 DIAGNOSIS — J34.2 DNS (DEVIATED NASAL SEPTUM): ICD-10-CM

## 2022-01-03 PROCEDURE — 31231 NASAL ENDOSCOPY DX: CPT | Performed by: OTOLARYNGOLOGY

## 2022-01-03 PROCEDURE — G0463 HOSPITAL OUTPT CLINIC VISIT: HCPCS

## 2022-01-03 PROCEDURE — 99214 OFFICE O/P EST MOD 30 MIN: CPT | Mod: 25 | Performed by: OTOLARYNGOLOGY

## 2022-01-03 PROCEDURE — 92504 EAR MICROSCOPY EXAMINATION: CPT | Performed by: OTOLARYNGOLOGY

## 2022-01-03 RX ORDER — LOPERAMIDE HCL 2 MG
CAPSULE ORAL 4 TIMES DAILY PRN
COMMUNITY
End: 2022-05-16

## 2022-01-03 RX ORDER — GUAIFENESIN/DEXTROMETHORPHAN 100-10MG/5
5 SYRUP ORAL EVERY 4 HOURS PRN
COMMUNITY
End: 2024-02-19

## 2022-01-03 RX ORDER — TOLNAFTATE 1 G/100G
POWDER TOPICAL 2 TIMES DAILY
COMMUNITY
End: 2022-05-12

## 2022-01-03 RX ORDER — ACETAMINOPHEN 325 MG/1
500 TABLET ORAL EVERY 6 HOURS PRN
COMMUNITY

## 2022-01-03 RX ORDER — BUDESONIDE 0.5 MG/2ML
INHALANT ORAL
Qty: 200 ML | Refills: 11 | Status: SHIPPED | OUTPATIENT
Start: 2022-01-03 | End: 2022-05-12

## 2022-01-03 RX ORDER — IBUPROFEN 800 MG/1
800 TABLET, FILM COATED ORAL EVERY 8 HOURS PRN
COMMUNITY

## 2022-01-03 RX ORDER — THERMOMETER, ELECTRONIC,ORAL
EACH MISCELLANEOUS 2 TIMES DAILY
COMMUNITY
End: 2022-05-12

## 2022-01-03 RX ORDER — PSEUDOEPHEDRINE HCL 30 MG
30 TABLET ORAL EVERY 4 HOURS PRN
COMMUNITY

## 2022-01-03 RX ORDER — MAGNESIUM HYDROXIDE/ALUMINUM HYDROXICE/SIMETHICONE 120; 1200; 1200 MG/30ML; MG/30ML; MG/30ML
SUSPENSION ORAL EVERY 4 HOURS PRN
COMMUNITY
End: 2022-05-12

## 2022-01-03 ASSESSMENT — PAIN SCALES - GENERAL: PAINLEVEL: SEVERE PAIN (6)

## 2022-01-03 ASSESSMENT — MIFFLIN-ST. JEOR: SCORE: 1906.69

## 2022-01-03 NOTE — PATIENT INSTRUCTIONS
Thank you for allowing Dr. Baumann and our ENT team to participate in your care.  If your medications are too expensive, please give the nurse a call.  We can possibly change this medication.  If you have a scheduling or an appointment question please contact our Health Unit Coordinator at their direct line [phone].   ALL nursing questions or concerns can be directed to your ENT nurse at: [phone] Harmony Laquita    Use Budesonide Rinses Twice Daily  Continue Flonase 2 Sprays, Once Daily  Follow up with Dr. Baumann in 3 month      Budesonide nasal saline irrigation per instructions:  -Obtain Geo Med Sinus rinse over the counter.    -Use warm distilled water and 2 packets of the salt solution that comes with the bottle, dissolve in bottle up to the 240 mL fernando.  -Add 1 vial of budesonide.  -Irrigate each side of your nose leaning over the sink, using 1/3 to 1/2 the volume of the bottle in each nostril every irrigation.  Irrigate 2 times daily.  -If additional rinses are needed/recommended, you may use the plan Geo Med Sinus irrigation without the use of added budesonide.       Instructions for Sinus Surgery    Recovery - Everyone recovers differently from a general anesthetic.  Symptoms such as fatigue, nausea, light-headedness, and sometimes a low grade fever (up to 100 degrees) are not unusual.  As your body removes the anesthetic drugs from circulation, these symptoms will resolve.  Your nose will be sore after surgery, and you may even have symptoms similar to a sinus infection with headache, congestion, and pressure.  These will resolve with healing.  For several days you may experience bloody drainage from the nose, please use the drip pad as necessary for this.  If there is persistent bleeding, please call the office during business hours or the on call ENT physician after hours.  There are no diet restrictions after sinus surgery, and you can resume your home medications.      Please do  not blow your nose until 1 week after surgery.  At 1 week you may gently blow your nose, unless otherwise indicated by Dr. Baumann.     Limit your activity to no strenuous activities until I see you for the first follow-up visit in approximately 2 weeks.      Medications - You were sent home with narcotic pain medication.  If you can tolerate the discomfort during your recovery by using just plain Tylenol or ibuprofen (advil), please do so.  However, do not hesitate to use the stronger pain medication if needed.  If you were sent home with an antibiotic, it is primarily used to help the healing process.  If it causes loose bowel movements or other signs of intolerance, it is appropriate to discontinue it.  By far the most important measure you can take to speed recovery, and maximize the chances of long term success of sinus surgery is using the sinus rinses at least three time per day for the first month after surgery.       Start Rubio Med saline irrigation tomorrow and use at least 5 times daily.     I recommend 2 rubio med bottles, one to add the budesonide to and irrigate with the budesonide rinse twice a day for 2 months.    In the other rubio med bottle use only the saline solution, and irrigate with this at least 3 additional times daily.    Perform gentle irrigation for the first week.  Starting 1 week after surgery, you should increase the volume of rubio med saline irrigation to each nostril, continuing to use the rinses in an alternating fashion at least 5 times daily.  You cannot use too much of the rubio med saline, but please limit budesonide rinses to twice daily.    At 2 weeks after surgery, you may also restart nasal steroids (flonase, nasonex, etc).        Complications - Problems related to sinus surgery almost always are detected during the operation, and special instruction will be given in that situation.  However, unexpected things can happen, and are all related to the structures around the  sinus cavities.  Symptoms that should alert you to a possible problem include: severe eye pain or eye swelling, persistent heavy bleeding from the nose, and high fevers with headache and neck pain.  Any of these symptoms should be called into my office or to the on call ENT if after hours.  The most common non-emergency complication of sinus surgery is the formation of scar tissue which can re-block the sinuses.  This is addressed below.    Follow-up -  As you have noted, there are quite a few follow-up visits after sinus surgery.  This is done to aggressively manage the most common complication of this technique, which is scar tissue blocking the sinuses.  These visits will require the examination of your nose and possibly removal of crusts of dry mucous and blood, with possible removal of early scar tissue.  Please prepare for these visits by using your sinus rinses.    If there are any questions or issues with the above, or if there are other issues that concern you, always feel free to call the clinic and I am happy to speak with you as soon as I can.    Elayne Baumann D.O.  Otolaryngology/Head and Neck Surgery  Allergy    [phone]   extension [phone]

## 2022-01-03 NOTE — NURSING NOTE
"Chief Complaint   Patient presents with     Follow Up     Recurrent Sinusitis, Snoring, Seasonal Allergic Rhinitis       Initial /78 (BP Location: Right arm, Patient Position: Sitting, Cuff Size: Adult Large)   Pulse 88   Temp 97.4  F (36.3  C) (Tympanic)   Ht 1.816 m (5' 11.5\")   Wt 96.2 kg (212 lb)   SpO2 99%   BMI 29.16 kg/m   Estimated body mass index is 29.16 kg/m  as calculated from the following:    Height as of this encounter: 1.816 m (5' 11.5\").    Weight as of this encounter: 96.2 kg (212 lb).  Medication Reconciliation: complete  Fawn Ulloa LPN    "

## 2022-01-03 NOTE — LETTER
"    1/3/2022         RE: Casey Pedro  [address]  [facility] [address]        Dear Colleague,    Thank you for referring your patient, Casey Pedro, to the [facility]. Please see a copy of my visit note below.     Otolaryngology Progress Note          Casey Pedro is a 41 year old male    Chronic recurrent sinusitis and septal deviation.  He was evaluated by Marisol on 11/15/2021     Accompanied by care takers today, Gerda العراقي with a moderate problem with nasal blockage rhinorrhea and facial pressure    Staff notes behavioral changes such as hitting his face and he complains about facial and nasal pain.  He does occasionally hit himself and staff.    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  Karalis to with polypoid disease extending to the skull base the skull base is intact the lamina is intact the optic nerve is not dehiscent  Bilateral inferior turbinate hypertrophy and janet     Silver 18/24        Physical Exam  /78 (BP Location: Right arm, Patient Position: Sitting, Cuff Size: Adult Large)   Pulse 88   Temp 97.4  F (36.3  C) (Tympanic)   Ht 1.816 m (5' 11.5\")   Wt 96.2 kg (212 lb)   SpO2 99%   BMI 29.16 kg/m    General - The patient is well nourished and well developed, and appears to have good nutritional status.  Alert and oriented to person, interactive.  Tolerates exam.   Head and Face - Normocephalic and atraumatic, with no gross asymmetry noted of the contour of the facial features.  The facial nerve is intact, with strong symmetric movements.  Neck-no palpable lymphadenopathy or thyroid mass.  Trachea is midline.  Eyes - Extraocular movements intact.   Ears- " External auditory canals are patent, tympanic membranes are intact without effusion or worrisome retractions   Nose - Nasal mucosa is pink and moist with no abnormal mucus.  The septum was deviated left at floor and right and superior septum occluding lateral nasal wall on right  Mouth - Examination of the oral cavity shows pink, healthy, moist mucosa.  No lesions or ulceration noted.  The dentition are in good repair.  The tongue is mobile and midline.  Throat - The walls of the oropharynx were smooth, pink, moist, symmetric, and had no lesions or ulcerations.  The tonsillar pillars and soft palate were symmetric.  The uvula was midline on elevation.        To evaluate the nose and sinuses, I performed rigid nasal endoscopy.  I applied topical nasal lidocaine and neosynephrine.    I began with the LEFT side using a 0 degree rigid nasal endoscope, and then similarly examined the RIGHT side    Findings:  Inferior turbinates:  enlarged  Middle turbinate and middle meatus:  No purulence, no polyposis, narrow access right lateral nasal wall  Superior meatus was evaluated  Mucosa is healthy throughout,  No stacy polyps nor polypoid degeneration  Sphenoethmoidal recess without purulence   Nasopharynx did not tolerate viz  The patient tolerated the procedure well      Impression/Plan  Casey Pedro is a 41 year old male    ICD-10-CM    1. Chronic pansinusitis  J32.4 budesonide (PULMICORT) 0.5 MG/2ML neb solution   2. DNS (deviated nasal septum)  J34.2    3. Nasal turbinate hypertrophy  J34.3          Start budesonide irrigations education provided  Continue Flonase  3 month follow-up with repeat endoscopy  If no improvement consider surgery, would need consent from his sisters         consider bilateral endoscopic sinus surgery, septoplasty, turbinate reduction     The risks and complications of sinus surgery were openly discussed.  The potential risks include bleeding, general anesthesia, infection, scar formation, need  for additional surgery, septal perforation, and rarely injury to the eye with the possibility of blindness,  injury to the brain, meningitis or other intracranial complications.  Nonsurgical options were discussed including prolonged antibioitics and/or oral and topical steroids.   Sinus anatomy and sinus disease were reviewed.  CT sinus was reviewed with the patient.  All questions were answered.      Continue all current medications recommended by me or my staff.     Continue budesonide irrigations.  Verbal and written education on use provided.     The importance of budesonide irrigations postoperatively was reinforced.     The correct use of nasal irrigations as prescribed will prevent synechiae and allow for proper recovery of the sinus mucosa.        Total  Fredy, balloons, propel  Excision janet     Risks of oral steroid use were discussed and include psychiatric/mood changes, insomnia, stomach ulcers and potential GI bleeding, blood sugar elevation/worsening diabetes, hip/bone necrosis called avascular necrosis, or bone demineralization.           Elayne Baumann D.O.  Otolaryngology/Head and Neck Surgery  Allergy            Again, thank you for allowing me to participate in the care of your patient.        Sincerely,        Elayne Baumann MD

## 2022-01-04 RX ORDER — LORAZEPAM 1 MG/1
TABLET ORAL
Qty: 90 TABLET | Refills: 0 | Status: SHIPPED | OUTPATIENT
Start: 2022-01-04

## 2022-01-04 NOTE — TELEPHONE ENCOUNTER
Lorazepam      Last Written Prescription Date:  11/04/21  Last Fill Quantity: 90,   # refills: 0  Last Office Visit: 11/04/21  Future Office visit:

## 2022-01-06 NOTE — TELEPHONE ENCOUNTER
METFORMIN      Last Written Prescription Date:  8-  Last Fill Quantity: 60,   # refills: 2  Last Office Visit: 11-4-2021  Future Office visit:       Routing refill request to provider for review/approval because:  Drug not on the G, P or [facility] refill protocol or controlled substance

## 2022-01-10 DIAGNOSIS — E11.9 TYPE 2 DIABETES MELLITUS WITHOUT COMPLICATION, WITHOUT LONG-TERM CURRENT USE OF INSULIN (H): ICD-10-CM

## 2022-01-11 RX ORDER — METFORMIN HCL 500 MG
500 TABLET, EXTENDED RELEASE 24 HR ORAL
Qty: 30 TABLET | Refills: 2 | Status: SHIPPED | OUTPATIENT
Start: 2022-01-11 | End: 2022-04-05

## 2022-01-11 NOTE — TELEPHONE ENCOUNTER
Establishing with laura needing new signature of PCP   Appointments in Next Year    Apr 11, 2022  8:15 AM  (Arrive by 8:00 AM)  Return Visit with Elayne Baumann MD  [facility] [place] ([facility] ) [phone]   May 10, 2022 10:00 AM  (Arrive by 9:45 AM)  New Patient with Laura Henley NP  [facility] [place] ([facility] ) [phone]

## 2022-01-31 DIAGNOSIS — J30.2 CHRONIC SEASONAL ALLERGIC RHINITIS: ICD-10-CM

## 2022-02-02 RX ORDER — CETIRIZINE HYDROCHLORIDE 10 MG/1
TABLET ORAL
Qty: 30 TABLET | Refills: 1 | Status: SHIPPED | OUTPATIENT
Start: 2022-02-02 | End: 2022-03-01

## 2022-02-07 DIAGNOSIS — J30.1 SEASONAL ALLERGIC RHINITIS DUE TO POLLEN: ICD-10-CM

## 2022-02-07 DIAGNOSIS — N42.9 DISORDER OF PROSTATE: ICD-10-CM

## 2022-02-07 DIAGNOSIS — E11.9 TYPE 2 DIABETES MELLITUS WITHOUT COMPLICATION, WITHOUT LONG-TERM CURRENT USE OF INSULIN (H): ICD-10-CM

## 2022-02-09 RX ORDER — ISOPROPYL ALCOHOL 0.75 G/1
SWAB TOPICAL
Qty: 200 EACH | Refills: 3 | Status: SHIPPED | OUTPATIENT
Start: 2022-02-09 | End: 2023-02-13

## 2022-02-09 RX ORDER — ASPIRIN 81 MG/1
TABLET, COATED ORAL
Qty: 90 TABLET | Refills: 0 | Status: SHIPPED | OUTPATIENT
Start: 2022-02-09 | End: 2022-05-27

## 2022-02-09 RX ORDER — FLUTICASONE PROPIONATE 50 MCG
1 SPRAY, SUSPENSION (ML) NASAL DAILY
Qty: 16 G | Refills: 2 | Status: SHIPPED | OUTPATIENT
Start: 2022-02-09 | End: 2022-05-16

## 2022-02-21 DIAGNOSIS — K59.01 SLOW TRANSIT CONSTIPATION: ICD-10-CM

## 2022-02-22 RX ORDER — POLYETHYLENE GLYCOL 3350 17 G/17G
POWDER, FOR SOLUTION ORAL
Qty: 510 G | Refills: 3 | Status: SHIPPED | OUTPATIENT
Start: 2022-02-22 | End: 2022-06-24

## 2022-02-22 NOTE — TELEPHONE ENCOUNTER
GAVILAX 17 GM/SCOOP powder      Last Written Prescription Date:  9/8/2021  Last Fill Quantity: 510g,   # refills: 3  Last Office Visit: 11/4/2021  Future Office visit:    Next 5 appointments (look out 90 days)    Apr 07, 2022  9:15 AM  (Arrive by 9:00 AM)  Return Visit with Elayne Baumann MD  [facility] [place] ([facility] [place] ) [facility] [address]  [place] [address]  [phone]         EStablish care appt 5/10/2022 with Pricilla Henley.

## 2022-02-28 DIAGNOSIS — J30.2 CHRONIC SEASONAL ALLERGIC RHINITIS: ICD-10-CM

## 2022-03-01 RX ORDER — CETIRIZINE HYDROCHLORIDE 10 MG/1
TABLET ORAL
Qty: 30 TABLET | Refills: 1 | Status: SHIPPED | OUTPATIENT
Start: 2022-03-01 | End: 2022-05-27

## 2022-03-01 NOTE — TELEPHONE ENCOUNTER
Zyrtec 10 mg      Last Written Prescription Date:  2-2-2022  Last Fill Quantity: 30,   # refills: 1  Last Office Visit:   Future Office visit:  Has an establish care appointment on 5-   Next 5 appointments (look out 90 days)    Apr 07, 2022  9:15 AM  (Arrive by 9:00 AM)  Return Visit with Elayne Baumann MD  [facility] [place] ([facility] [place] ) [address]  [place] [address]  [phone]

## 2022-04-27 DIAGNOSIS — Z79.899 DRUG THERAPY: Primary | ICD-10-CM

## 2022-05-03 DIAGNOSIS — F91.9 DISRUPTIVE BEHAVIOR DISORDER: ICD-10-CM

## 2022-05-03 DIAGNOSIS — K21.9 GASTROESOPHAGEAL REFLUX DISEASE WITHOUT ESOPHAGITIS: ICD-10-CM

## 2022-05-03 DIAGNOSIS — E11.9 TYPE 2 DIABETES MELLITUS WITHOUT COMPLICATION, WITHOUT LONG-TERM CURRENT USE OF INSULIN (H): ICD-10-CM

## 2022-05-04 RX ORDER — OMEPRAZOLE 40 MG/1
40 CAPSULE, DELAYED RELEASE ORAL
Qty: 60 CAPSULE | Refills: 0 | Status: SHIPPED | OUTPATIENT
Start: 2022-05-04 | End: 2022-05-12

## 2022-05-04 RX ORDER — METFORMIN HCL 500 MG
500 TABLET, EXTENDED RELEASE 24 HR ORAL
Qty: 30 TABLET | Refills: 0 | Status: SHIPPED | OUTPATIENT
Start: 2022-05-04 | End: 2022-06-08

## 2022-05-04 NOTE — TELEPHONE ENCOUNTER
Omeprazole     Est car harle 5-12  Last Written Prescription Date:  7-7-21  Last Fill Quantity: 60,   # refills: 9  Last Office Visit: 1-3-22  Future Office visit:    Next 5 appointments (look out 90 days)    May 16, 2022  2:45 PM  (Arrive by 2:30 PM)  Return Visit with Elayne Baumann MD  [facility] [place] ([facility] [place] ) [address] [place] [address]  [phone]

## 2022-05-04 NOTE — TELEPHONE ENCOUNTER
Metformin XR  500 mg   Last Written Prescription Date:  4-5-2022  Last Fill Quantity: 30,   # refills: 0  Last Office Visit:   Future Office visit:    Next 5 appointments (look out 90 days)    May 16, 2022  2:45 PM  (Arrive by 2:30 PM)  Return Visit with Elayne Baumann MD  [facility] ([facility] ) [address] [place] [address]  [phone]           Has an establish care appointment on 5-

## 2022-05-05 ENCOUNTER — LAB (OUTPATIENT)
Dept: LAB | Facility: OTHER | Age: 42
End: 2022-05-05
Payer: MEDICARE

## 2022-05-05 DIAGNOSIS — Z79.899 DRUG THERAPY: ICD-10-CM

## 2022-05-05 LAB
ALBUMIN SERPL-MCNC: 4.1 G/DL (ref 3.4–5)
ALP SERPL-CCNC: 41 U/L (ref 40–150)
ALT SERPL W P-5'-P-CCNC: 40 U/L (ref 0–70)
AMMONIA PLAS-SCNC: 50 UMOL/L (ref 10–50)
AST SERPL W P-5'-P-CCNC: 19 U/L (ref 0–45)
BASOPHILS # BLD AUTO: 0 10E3/UL (ref 0–0.2)
BASOPHILS NFR BLD AUTO: 1 %
BILIRUB DIRECT SERPL-MCNC: 0.2 MG/DL (ref 0–0.2)
BILIRUB SERPL-MCNC: 0.9 MG/DL (ref 0.2–1.3)
EOSINOPHIL # BLD AUTO: 0.1 10E3/UL (ref 0–0.7)
EOSINOPHIL NFR BLD AUTO: 2 %
ERYTHROCYTE [DISTWIDTH] IN BLOOD BY AUTOMATED COUNT: 12.3 % (ref 10–15)
HCT VFR BLD AUTO: 49.7 % (ref 40–53)
HGB BLD-MCNC: 17.2 G/DL (ref 13.3–17.7)
IMM GRANULOCYTES # BLD: 0 10E3/UL
IMM GRANULOCYTES NFR BLD: 0 %
LYMPHOCYTES # BLD AUTO: 2 10E3/UL (ref 0.8–5.3)
LYMPHOCYTES NFR BLD AUTO: 42 %
MCH RBC QN AUTO: 29.3 PG (ref 26.5–33)
MCHC RBC AUTO-ENTMCNC: 34.6 G/DL (ref 31.5–36.5)
MCV RBC AUTO: 85 FL (ref 78–100)
MONOCYTES # BLD AUTO: 0.4 10E3/UL (ref 0–1.3)
MONOCYTES NFR BLD AUTO: 8 %
NEUTROPHILS # BLD AUTO: 2.3 10E3/UL (ref 1.6–8.3)
NEUTROPHILS NFR BLD AUTO: 47 %
NRBC # BLD AUTO: 0 10E3/UL
NRBC BLD AUTO-RTO: 0 /100
PLATELET # BLD AUTO: 144 10E3/UL (ref 150–450)
PROT SERPL-MCNC: 7.4 G/DL (ref 6.8–8.8)
RBC # BLD AUTO: 5.88 10E6/UL (ref 4.4–5.9)
VALPROATE SERPL-MCNC: 69 MG/L
WBC # BLD AUTO: 4.8 10E3/UL (ref 4–11)

## 2022-05-05 PROCEDURE — 82140 ASSAY OF AMMONIA: CPT | Mod: ZL

## 2022-05-05 PROCEDURE — 80076 HEPATIC FUNCTION PANEL: CPT | Mod: ZL

## 2022-05-05 PROCEDURE — 85004 AUTOMATED DIFF WBC COUNT: CPT | Mod: ZL

## 2022-05-05 PROCEDURE — 80164 ASSAY DIPROPYLACETIC ACD TOT: CPT | Mod: ZL

## 2022-05-05 PROCEDURE — 36415 COLL VENOUS BLD VENIPUNCTURE: CPT | Mod: ZL

## 2022-05-10 NOTE — PROGRESS NOTES
Assessment & Plan     Type 2 diabetes mellitus without complication, without long-term current use of insulin (H)  A1c pending. Will notify patient of the results when available and intervene accordingly. On statin. BP at goal. Has eye exams yearly. Will request records. See me 3 months.     - Hemoglobin A1c  - Albumin Random Urine Quantitative with Creat Ratio  - Basic metabolic panel    Encounter to establish care  Autism  Mild intellectual disability  Patient is in need of a new provider. he has been explained the role of a CNP and the fact that I do not follow patients in the hospital. he was told that should he get admitted, he would then be followed by a hospitalist. he verbalizes understanding and would like to establish a relationship now.     Screening for HIV (human immunodeficiency virus)  - HIV Antigen Antibody Combo  -Will notify patient of the results when available and intervene accordingly.     -  Encounter for HCV screening test for low risk patient  - Hepatitis C Screen Reflex to HCV RNA Quant and Genotype  -Will notify patient of the results when available and intervene accordingly.     Hyperlipidemia, unspecified hyperlipidemia type  Tolerating statin. Will continue. Lipids well controlled in 9/2021.     Mild intermittent asthma without complication  Controlled. ACT 21.     Benign essential hypertension  Well controlled. Continue current medications. Encouraged daily exercise and a low sodium diet. Recommended checking BP's 2x/wk, call the clinic if consistantly s>140 or d>90. Follow up in 6 months.     Vitamin D deficiency  - Vitamin D Deficiency  -Will notify patient of the results when available and intervene accordingly.     Dyspepsia  Currently taking omeprazole 40 mg BID. Group home staff unaware if this dose has ever been lowered. Will try lowering to 20 mg BID and reassess in 4 weeks. If having symptoms, will add pepcid in addition to the omeprazole.     - omeprazole (PRILOSEC) 20 MG   "capsule; Take 1 capsule (20 mg) by mouth 2 times daily    Sports Physical  No concerns. Paperwork completed for the [employer].   956}     BMI:   Estimated body mass index is 28.61 kg/m  as calculated from the following:    Height as of this encounter: 1.816 m (5' 11.5\").    Weight as of this encounter: 94.3 kg (208 lb).   Weight management plan: Discussed healthy diet and exercise guidelines    Pricilla Henley NP  [facility] ISAIAH Peña is a 41 year old who presents for the following health issues  accompanied by his staff Carly.    HPI     NEW Patient  At this time, past medical history, current medications, allergies and drug sensitivities, immunizations, habits and life style, family history, and social history are reviewed and updated.    Due for HIV and Hep C screening. Willing to do.     Due for his Covid vaccine. Willing to get.     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     Diabetes Follow-up    How often are you checking your blood sugar? One time daily, fasting glucoses high 90s, 110-130 after eating  What time of day are you checking your blood sugars (select all that apply)?  morning and night alternating days   Have you had any blood sugars above 200?  No  Have you had any blood sugars below 70?  No    What symptoms do you notice when your blood sugar is low?  None    What concerns do you have today about your diabetes? None     Do you have any of these symptoms? (Select all that apply)  No numbness or tingling in feet.  No redness, sores or blisters on feet.  No complaints of excessive thirst.  No reports of blurry vision.  No significant changes to weight.    Have you had a diabetic eye exam in the last 12 months? Yes, has annually, group home staff unsure where or when he last had this    A1C was 5.2 on 9/8/21. Taking Metformin 500 mg XR daily. Denies any side effects.     Denies chest pain, shortness of breath, dizziness, syncope, or " palpitations.    Diabetic Foot Screen:  Any complaints of increased pain or numbness ? No  Is there a foot ulcer now or a history of foot ulcer? No  Does the foot have an abnormal shape? No  Are the nails thick, too long or ingrown? No  Are there any redness or open areas? No         Sensation Testing done at all points on the diagram with monofilament     Right Foot: Sensation Normal at all points  Left Foot: Sensation Normal at all points     Risk Category: 0- No loss of protective sensation  Performed by  Pricilla Henley CNP      Hyperlipidemia Follow-Up      Are you regularly taking any medication or supplement to lower your cholesterol?   Yes- Lipitor 40 mg    Are you having muscle aches or other side effects that you think could be caused by your cholesterol lowering medication?  No , patient autistic and does not always answer     Denies chest pain, shortness of breath, dizziness, syncope, or palpitations. No nausea or vomiting.     Hypertension Follow-up      Do you check your blood pressure regularly outside of the clinic? Yes, weekly on Sundays, group home staff unsure readings     Are you following a low salt diet? Yes    Are your blood pressures ever more than 140 on the top number (systolic) OR more   than 90 on the bottom number (diastolic), for example 140/90? No   Taking lisinopril 5 mg without side effects.   Denies chest pain, shortness of breath, dizziness, syncope, or palpitations.       BP Readings from Last 2 Encounters:   05/12/22 100/72   01/03/22 108/78     Hemoglobin A1C POCT (%)   Date Value   07/20/2020 5.3   01/20/2020 5.4     Hemoglobin A1C (%)   Date Value   09/08/2021 5.2   09/01/2021 5.3     LDL Cholesterol Calculated (mg/dL)   Date Value   09/08/2021 13   09/01/2021 24   01/20/2020 26   05/01/2018     Cannot estimate LDL when triglyceride exceeds 400 mg/dL       Autism, Disruptive Behavior Follow-Up    How are you doing with your depression since your last visit? No  change-stable    How are you doing with your anxiety since your last visit?  No change-stable    Are you having other symptoms that might be associated with depression or anxiety? No    Have you had a significant life event? No     Do you have any concerns with your use of alcohol or other drugs? No     Taking risperidone, Zyprexa, depakote, guanfacine, and Ativan as needed.     Follows with psychiatry-Dr. Villegas.    No thoughts of suicide.    Social History     Tobacco Use     Smoking status: Never Smoker     Smokeless tobacco: Never Used     Tobacco comment: no passive exposure   Substance Use Topics     Alcohol use: No     Drug use: No     PHQ 5/13/2019 7/20/2020 5/12/2022   PHQ-9 Total Score - 0 0   Q9: Thoughts of better off dead/self-harm past 2 weeks (No Data) Not at all Not at all     SAL-7 SCORE 5/1/2018 7/20/2020 5/12/2022   Total Score 1 1 0     Last PHQ-9 5/12/2022   1.  Little interest or pleasure in doing things 0   2.  Feeling down, depressed, or hopeless 0   3.  Trouble falling or staying asleep, or sleeping too much 0   4.  Feeling tired or having little energy 0   5.  Poor appetite or overeating 0   6.  Feeling bad about yourself 0   7.  Trouble concentrating 0   8.  Moving slowly or restless 0   Q9: Thoughts of better off dead/self-harm past 2 weeks 0   PHQ-9 Total Score 0   Difficulty at work, home, or with people Not difficult at all     SAL-7  5/12/2022   1. Feeling nervous, anxious, or on edge 0   2. Not being able to stop or control worrying 0   3. Worrying too much about different things 0   4. Trouble relaxing 0   5. Being so restless that it is hard to sit still 0   6. Becoming easily annoyed or irritable 0   7. Feeling afraid, as if something awful might happen 0   SAL-7 Total Score 0   If you checked any problems, how difficult have they made it for you to do your work, take care of things at home, or get along with other people? Not difficult at all       Asthma/ Seasonal Allergy  Follow-Up    Was ACT completed today?    Yes    ACT Total Scores 5/12/2022   ACT TOTAL SCORE (Goal Greater than or Equal to 20) 21   In the past 12 months, how many times did you visit the emergency room for your asthma without being admitted to the hospital? 0   In the past 12 months, how many times were you hospitalized overnight because of your asthma? 0          How many days per week do you miss taking your asthma controller medication?  0    Please describe any recent triggers for your asthma: Patient is unaware of triggers    Have you had any Emergency Room Visits, Urgent Care Visits, or Hospital Admissions since your last office visit?  No    Unable to complete PFTs due to autism.   Taking Zyrtec with Flonase daily.     Constipation; uses Miralax and senna daily. No current issues. Having a soft stool daily.     Dyspepsia, taking omeprazole 40 mg BID. He notes that he has taken this for years. Group home staff unaware if he has ever tried lowering the dose. No nausea or vomiting. No melena.       How many servings of fruits and vegetables do you eat daily?  2-3    On average, how many sweetened beverages do you drink each day (Examples: soda, juice, sweet tea, etc.  Do NOT count diet or artificially sweetened beverages)?   0    How many days per week do you exercise enough to make your heart beat faster? 7    How many minutes a day do you exercise enough to make your heart beat faster? 60 or more    How many days per week do you miss taking your medication? 0      Review of Systems   CONSTITUTIONAL: NEGATIVE for fever, chills, change in weight  INTEGUMENTARY/SKIN: NEGATIVE for worrisome rashes, moles or lesions  EYES: NEGATIVE for vision changes or irritation  ENT/MOUTH: NEGATIVE for ear, mouth and throat problems  RESP: NEGATIVE for significant cough or SOB  CV: NEGATIVE for chest pain, palpitations or peripheral edema  GI: NEGATIVE for nausea, abdominal pain, heartburn, or change in bowel habits  :  "NEGATIVE for frequency, dysuria, or hematuria  MUSCULOSKELETAL: NEGATIVE for significant arthralgias or myalgia  NEURO: NEGATIVE for weakness, dizziness or paresthesias  ENDOCRINE: NEGATIVE for temperature intolerance, skin/hair changes  PSYCHIATRIC: NEGATIVE for changes in mood or affect      Objective    /72 (BP Location: Right arm, Patient Position: Chair, Cuff Size: Adult Large)   Pulse 70   Temp 97.4  F (36.3  C) (Tympanic)   Ht 1.816 m (5' 11.5\")   Wt 94.3 kg (208 lb)   SpO2 98%   BMI 28.61 kg/m    Body mass index is 28.61 kg/m .  Physical Exam   GENERAL: healthy, alert and no distress, rarely talks  EYES: Eyes grossly normal to inspection, PERRL and conjunctivae and sclerae normal  HENT: ear canals and TM's normal, nose and mouth without ulcers or lesions  NECK: no adenopathy, no asymmetry, masses, or scars and thyroid normal to palpation  RESP: lungs clear to auscultation - no rales, rhonchi or wheezes  CV: regular rate and rhythm, no murmur, click or rub, no peripheral edema  ABDOMEN: soft, nontender, no hepatosplenomegaly, no masses and bowel sounds normal   (male): normal male genitalia without lesions or urethral discharge, no hernia  MS: no gross musculoskeletal defects noted, no edema  SKIN: no suspicious lesions or rashes  NEURO: Normal strength and tone, mentation intact and speech normal  PSYCH: Autistic-rarely talks, affect normal/bright  Diabetic foot exam: normal DP and PT pulses, no trophic changes or ulcerative lesions and normal sensory exam    Labs pending          "

## 2022-05-12 ENCOUNTER — OFFICE VISIT (OUTPATIENT)
Dept: FAMILY MEDICINE | Facility: OTHER | Age: 42
End: 2022-05-12
Attending: NURSE PRACTITIONER
Payer: MEDICARE

## 2022-05-12 ENCOUNTER — TELEPHONE (OUTPATIENT)
Dept: FAMILY MEDICINE | Facility: OTHER | Age: 42
End: 2022-05-12

## 2022-05-12 VITALS
HEART RATE: 70 BPM | OXYGEN SATURATION: 98 % | DIASTOLIC BLOOD PRESSURE: 72 MMHG | WEIGHT: 208 LBS | TEMPERATURE: 97.4 F | HEIGHT: 72 IN | BODY MASS INDEX: 28.17 KG/M2 | SYSTOLIC BLOOD PRESSURE: 100 MMHG

## 2022-05-12 DIAGNOSIS — F70 MILD INTELLECTUAL DISABILITY: ICD-10-CM

## 2022-05-12 DIAGNOSIS — F84.0 AUTISM: ICD-10-CM

## 2022-05-12 DIAGNOSIS — Z76.89 ENCOUNTER TO ESTABLISH CARE: ICD-10-CM

## 2022-05-12 DIAGNOSIS — E55.9 VITAMIN D DEFICIENCY: ICD-10-CM

## 2022-05-12 DIAGNOSIS — J45.20 MILD INTERMITTENT ASTHMA WITHOUT COMPLICATION: ICD-10-CM

## 2022-05-12 DIAGNOSIS — Z02.5 SPORTS PHYSICAL: ICD-10-CM

## 2022-05-12 DIAGNOSIS — Z11.4 SCREENING FOR HIV (HUMAN IMMUNODEFICIENCY VIRUS): ICD-10-CM

## 2022-05-12 DIAGNOSIS — E11.9 TYPE 2 DIABETES MELLITUS WITHOUT COMPLICATION, WITHOUT LONG-TERM CURRENT USE OF INSULIN (H): Primary | ICD-10-CM

## 2022-05-12 DIAGNOSIS — E78.5 HYPERLIPIDEMIA, UNSPECIFIED HYPERLIPIDEMIA TYPE: ICD-10-CM

## 2022-05-12 DIAGNOSIS — I10 BENIGN ESSENTIAL HYPERTENSION: ICD-10-CM

## 2022-05-12 DIAGNOSIS — R10.13 DYSPEPSIA: ICD-10-CM

## 2022-05-12 DIAGNOSIS — Z11.59 ENCOUNTER FOR HCV SCREENING TEST FOR LOW RISK PATIENT: ICD-10-CM

## 2022-05-12 LAB
ANION GAP SERPL CALCULATED.3IONS-SCNC: 5 MMOL/L (ref 3–14)
BUN SERPL-MCNC: 12 MG/DL (ref 7–30)
CALCIUM SERPL-MCNC: 9 MG/DL (ref 8.5–10.1)
CHLORIDE BLD-SCNC: 103 MMOL/L (ref 94–109)
CO2 SERPL-SCNC: 26 MMOL/L (ref 20–32)
CREAT SERPL-MCNC: 0.68 MG/DL (ref 0.66–1.25)
CREAT UR-MCNC: 60 MG/DL
EST. AVERAGE GLUCOSE BLD GHB EST-MCNC: 103 MG/DL
GFR SERPL CREATININE-BSD FRML MDRD: >90 ML/MIN/1.73M2
GLUCOSE BLD-MCNC: 101 MG/DL (ref 70–99)
HBA1C MFR BLD: 5.2 % (ref 0–5.6)
MICROALBUMIN UR-MCNC: <5 MG/L
MICROALBUMIN/CREAT UR: NORMAL MG/G{CREAT}
POTASSIUM BLD-SCNC: 4 MMOL/L (ref 3.4–5.3)
SODIUM SERPL-SCNC: 134 MMOL/L (ref 133–144)

## 2022-05-12 PROCEDURE — 99214 OFFICE O/P EST MOD 30 MIN: CPT | Performed by: NURSE PRACTITIONER

## 2022-05-12 PROCEDURE — 83036 HEMOGLOBIN GLYCOSYLATED A1C: CPT | Mod: ZL | Performed by: NURSE PRACTITIONER

## 2022-05-12 PROCEDURE — 82043 UR ALBUMIN QUANTITATIVE: CPT | Mod: ZL | Performed by: NURSE PRACTITIONER

## 2022-05-12 PROCEDURE — 91305 COVID-19,PF,PFIZER (12+ YRS): CPT

## 2022-05-12 PROCEDURE — 36415 COLL VENOUS BLD VENIPUNCTURE: CPT | Mod: ZL | Performed by: NURSE PRACTITIONER

## 2022-05-12 PROCEDURE — 86803 HEPATITIS C AB TEST: CPT | Mod: ZL | Performed by: NURSE PRACTITIONER

## 2022-05-12 PROCEDURE — 82306 VITAMIN D 25 HYDROXY: CPT | Mod: ZL | Performed by: NURSE PRACTITIONER

## 2022-05-12 PROCEDURE — 87389 HIV-1 AG W/HIV-1&-2 AB AG IA: CPT | Mod: ZL | Performed by: NURSE PRACTITIONER

## 2022-05-12 PROCEDURE — 80048 BASIC METABOLIC PNL TOTAL CA: CPT | Mod: ZL | Performed by: NURSE PRACTITIONER

## 2022-05-12 PROCEDURE — G0463 HOSPITAL OUTPT CLINIC VISIT: HCPCS | Mod: 25

## 2022-05-12 ASSESSMENT — ANXIETY QUESTIONNAIRES
3. WORRYING TOO MUCH ABOUT DIFFERENT THINGS: NOT AT ALL
2. NOT BEING ABLE TO STOP OR CONTROL WORRYING: NOT AT ALL
6. BECOMING EASILY ANNOYED OR IRRITABLE: NOT AT ALL
7. FEELING AFRAID AS IF SOMETHING AWFUL MIGHT HAPPEN: NOT AT ALL
5. BEING SO RESTLESS THAT IT IS HARD TO SIT STILL: NOT AT ALL
GAD7 TOTAL SCORE: 0
IF YOU CHECKED OFF ANY PROBLEMS ON THIS QUESTIONNAIRE, HOW DIFFICULT HAVE THESE PROBLEMS MADE IT FOR YOU TO DO YOUR WORK, TAKE CARE OF THINGS AT HOME, OR GET ALONG WITH OTHER PEOPLE: NOT DIFFICULT AT ALL
1. FEELING NERVOUS, ANXIOUS, OR ON EDGE: NOT AT ALL
4. TROUBLE RELAXING: NOT AT ALL

## 2022-05-12 ASSESSMENT — ASTHMA QUESTIONNAIRES
QUESTION_3 LAST FOUR WEEKS HOW OFTEN DID YOUR ASTHMA SYMPTOMS (WHEEZING, COUGHING, SHORTNESS OF BREATH, CHEST TIGHTNESS OR PAIN) WAKE YOU UP AT NIGHT OR EARLIER THAN USUAL IN THE MORNING: ONCE OR TWICE
QUESTION_2 LAST FOUR WEEKS HOW OFTEN HAVE YOU HAD SHORTNESS OF BREATH: ONCE OR TWICE A WEEK
QUESTION_5 LAST FOUR WEEKS HOW WOULD YOU RATE YOUR ASTHMA CONTROL: WELL CONTROLLED
QUESTION_1 LAST FOUR WEEKS HOW MUCH OF THE TIME DID YOUR ASTHMA KEEP YOU FROM GETTING AS MUCH DONE AT WORK, SCHOOL OR AT HOME: A LITTLE OF THE TIME
ACT_TOTALSCORE: 21
ACT_TOTALSCORE: 21
QUESTION_4 LAST FOUR WEEKS HOW OFTEN HAVE YOU USED YOUR RESCUE INHALER OR NEBULIZER MEDICATION (SUCH AS ALBUTEROL): NOT AT ALL

## 2022-05-12 ASSESSMENT — PAIN SCALES - GENERAL: PAINLEVEL: NO PAIN (0)

## 2022-05-12 ASSESSMENT — PATIENT HEALTH QUESTIONNAIRE - PHQ9: SUM OF ALL RESPONSES TO PHQ QUESTIONS 1-9: 0

## 2022-05-12 NOTE — NURSING NOTE
"Chief Complaint   Patient presents with     Establish Care       Initial There were no vitals taken for this visit. Estimated body mass index is 29.16 kg/m  as calculated from the following:    Height as of 1/3/22: 1.816 m (5' 11.5\").    Weight as of 1/3/22: 96.2 kg (212 lb).  Medication Reconciliation: complete  Flaca Cohen LPN  "

## 2022-05-12 NOTE — TELEPHONE ENCOUNTER
Received PA from [facility] for Omeprazole 20MG dr capsules. Submitted on CMM. Waiting for response.

## 2022-05-12 NOTE — LETTER
May 13, 2022      Casey Pedro  [address] [address]        Dear ,    We are writing to inform you of your test results.    Your results are negative.     Resulted Orders   Hemoglobin A1c   Result Value Ref Range    Estimated Average Glucose 103 mg/dL    Hemoglobin A1C 5.2 0.0 - 5.6 %      Comment:      Normal <5.7%   Prediabetes 5.7-6.4%    Diabetes 6.5% or higher     Note: Adopted from ADA consensus guidelines.   Albumin Random Urine Quantitative with Creat Ratio   Result Value Ref Range    Creatinine Urine mg/dL 60 mg/dL    Albumin Urine mg/L <5 mg/L    Albumin Urine mg/g Cr        Comment:      Unable to calculate:  Urine creatinine or albumin value below detectable level   HIV Antigen Antibody Combo   Result Value Ref Range    HIV Antigen Antibody Combo Nonreactive Nonreactive      Comment:      HIV-1 p24 Ag & HIV-1/HIV-2 Ab Not Detected   Hepatitis C Screen Reflex to HCV RNA Quant and Genotype   Result Value Ref Range    Hepatitis C Antibody Nonreactive Nonreactive    Narrative    Assay performance characteristics have not been established for newborns, infants, and children.   Vitamin D Deficiency   Result Value Ref Range    Vitamin D, Total (25-Hydroxy) 35 20 - 75 ug/L    Narrative    Season, race, dietary intake, and treatment affect the concentration of 25-hydroxy-Vitamin D. Values may decrease during winter months and increase during summer months. Values 20-29 ug/L may indicate Vitamin D insufficiency and values <20 ug/L may indicate Vitamin D deficiency.    Vitamin D determination is routinely performed by an immunoassay specific for 25 hydroxyvitamin D3.  If an individual is on vitamin D2(ergocalciferol) supplementation, please specify 25 OH vitamin D2 and D3 level determination by LCMSMS test VITD23.     Basic metabolic panel   Result Value Ref Range    Sodium 134 133 - 144 mmol/L    Potassium 4.0 3.4 - 5.3 mmol/L    Chloride 103 94 - 109 mmol/L    Carbon Dioxide (CO2) 26 20  - 32 mmol/L    Anion Gap 5 3 - 14 mmol/L    Urea Nitrogen 12 7 - 30 mg/dL    Creatinine 0.68 0.66 - 1.25 mg/dL    Calcium 9.0 8.5 - 10.1 mg/dL    Glucose 101 (H) 70 - 99 mg/dL    GFR Estimate >90 >60 mL/min/1.73m2      Comment:      Effective December 21, 2021 eGFRcr in adults is calculated using the 2021 CKD-EPI creatinine equation which includes age and gender (Collins et al., NEJM, DOI: 10.1056/HNVPbj8703427)       If you have any questions or concerns, please call the clinic at the number listed above.       Sincerely,      Pricilla Henley NP

## 2022-05-13 LAB
DEPRECATED CALCIDIOL+CALCIFEROL SERPL-MC: 35 UG/L (ref 20–75)
HCV AB SERPL QL IA: NONREACTIVE
HIV 1+2 AB+HIV1 P24 AG SERPL QL IA: NONREACTIVE

## 2022-05-13 ASSESSMENT — ANXIETY QUESTIONNAIRES: GAD7 TOTAL SCORE: 0

## 2022-05-13 NOTE — TELEPHONE ENCOUNTER
[facility] capsules. Effective 01/01/2022-12/31/2022. Forms scanned to [facility].

## 2022-05-15 NOTE — PROGRESS NOTES
"Otolaryngology Progress Note          Casey Pedro is a 41 year old male  Presents for follow-up of chronic recurrent sinusitis.  I last saw him in January and started him on budesonide irrigations.     Lives at a care center and his caretakers had noticed behavior changes including hitting his face and complaint for the facial and nasal pain     No prior nasal surgery no nasal trauma        Since his last visit behavior has improved  No purulence, afebrile    Accompanied by care taker, Deepa    She notes chronic mouth breathing  Did not tolerate budesonide irrigations    Uses flonase 1 spray each nares daily, tolerates this well    He did let me perform endoscopy at 1/3/22 visit        CT sinus dated 11/16/2021 was reviewed     There is a caudal septal deviation to the left and a mid septal deviation to the left polypoid mucoperiosteal thickening of the right frontal bilateral anterior posterior ethmoid maxillary and sphenoid 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  Alert and oriented to person and place, interactive.  Head and Face - Normocephalic and atraumatic, with no gross asymmetry noted of the contour of the facial features.  The facial nerve is intact, with strong symmetric movements.  Eyes - Extraocular movements intact.   Nose - Nasal mucosa is pink and moist with no abnormal mucus.  dns left, bilateral  inferior turbinate " hypertrophy  Mouth - Examination of the oral cavity shows pink, healthy, moist mucosa.  No lesions or ulceration noted.  The dentition are in good repair.  The tongue is mobile and midline.  Mouth breather, poor oral tongue muscle tone  Throat - The walls of the oropharynx were smooth, pink, moist, symmetric, and had no lesions or ulcerations.  The tonsillar pillars and soft palate were symmetric.  The uvula was midline on elevation.        To evaluate the nose and sinuses, I performed rigid nasal endoscopy.  I applied topical nasal lidocaine and neosynephrine.    I began with the LEFT side using a 0 degree rigid nasal endoscope, and then similarly examined the RIGHT side    Findings:  Inferior turbinates:  edematous  Middle turbinate and middle meatus:  No purulence, no polyposis, narrow access  Lateral nasal wall bilaterally due to edema  Superior meatus was evaluated  Mucosa is edematous throughout,  No viz polyps nor polypoid degeneration  Sphenoethmoidal recess without purulence   Nasopharynx clear, et patent, no mass or purulence, no adenoids  The patient tolerated the procedure well      Impression/Plan  Casey Pedro is a 41 year old male    ICD-10-CM    1. Chronic pansinusitis  J32.4    2. Nasal turbinate hypertrophy  J34.3    3. DNS (deviated nasal septum)  J34.2          Use Flonase 2 sprays, once daily  Did not tolerate budesonide irrigations    Contact us if behavior worsen to order an updated CT sinus and discuss surgery     consider bilateral endoscopic sinus surgery, septoplasty, turbinate reduction  Would need staff to perform postoperative budesonide irrigations or require sedated post op debridements if he develops synechiae     The risks and complications of sinus surgery were openly discussed.  The potential risks include bleeding, general anesthesia, infection, scar formation, need for additional surgery, septal perforation, and rarely injury to the eye with the possibility of blindness,   injury to the brain, meningitis or other intracranial complications.  Nonsurgical options were discussed including prolonged antibioitics and/or oral and topical steroids.   Sinus anatomy and sinus disease were reviewed.  CT sinus was reviewed with the patient.  All questions were answered.      Continue all current medications recommended by me or my staff.     Continue budesonide irrigations.  Verbal and written education on use provided.     The importance of budesonide irrigations postoperatively was reinforced.     The correct use of nasal irrigations as prescribed will prevent synechiae and allow for proper recovery of the sinus mucosa.        Total  Fredy, balloons, propel  Excision janet     Risks of oral steroid use were discussed and include psychiatric/mood changes, insomnia, stomach ulcers and potential GI bleeding, blood sugar elevation/worsening diabetes, hip/bone necrosis called avascular necrosis, or bone demineralization.          Elayne Baumann D.O.  Otolaryngology/Head and Neck Surgery  Allergy

## 2022-05-16 ENCOUNTER — OFFICE VISIT (OUTPATIENT)
Dept: OTOLARYNGOLOGY | Facility: OTHER | Age: 42
End: 2022-05-16
Attending: OTOLARYNGOLOGY
Payer: MEDICARE

## 2022-05-16 VITALS
HEART RATE: 88 BPM | BODY MASS INDEX: 27.5 KG/M2 | TEMPERATURE: 97.6 F | OXYGEN SATURATION: 98 % | HEIGHT: 72 IN | DIASTOLIC BLOOD PRESSURE: 80 MMHG | WEIGHT: 203 LBS | SYSTOLIC BLOOD PRESSURE: 112 MMHG

## 2022-05-16 DIAGNOSIS — J34.2 DNS (DEVIATED NASAL SEPTUM): ICD-10-CM

## 2022-05-16 DIAGNOSIS — J32.4 CHRONIC PANSINUSITIS: Primary | ICD-10-CM

## 2022-05-16 DIAGNOSIS — J34.3 NASAL TURBINATE HYPERTROPHY: ICD-10-CM

## 2022-05-16 PROCEDURE — G0463 HOSPITAL OUTPT CLINIC VISIT: HCPCS | Mod: 25

## 2022-05-16 PROCEDURE — 31231 NASAL ENDOSCOPY DX: CPT | Performed by: OTOLARYNGOLOGY

## 2022-05-16 PROCEDURE — 99214 OFFICE O/P EST MOD 30 MIN: CPT | Mod: 25 | Performed by: OTOLARYNGOLOGY

## 2022-05-16 RX ORDER — FLUTICASONE PROPIONATE 50 MCG
2 SPRAY, SUSPENSION (ML) NASAL DAILY
Qty: 16 G | Refills: 11 | Status: SHIPPED | OUTPATIENT
Start: 2022-05-16 | End: 2023-05-19

## 2022-05-16 ASSESSMENT — PAIN SCALES - GENERAL: PAINLEVEL: NO PAIN (0)

## 2022-05-16 NOTE — PATIENT INSTRUCTIONS
Thank you for allowing Dr. Baumann and our ENT team to participate in your care.  If your medications are too expensive, please give the nurse a call.  We can possibly change this medication.  If you have a scheduling or an appointment question please contact our Health Unit Coordinator at their direct line [phone].   ALL nursing questions or concerns can be directed to your ENT nurse at: [phone] - [place]    Use Flonase 2 sprays, once daily    Contact us if behavior worsen to order a CT scan and discuss surgery

## 2022-05-16 NOTE — NURSING NOTE
"Chief Complaint   Patient presents with     Follow Up     Chronic Pansinusitis, Deviated Nasal Septum, Nasal Turbinate Hypertrophy       Initial /80 (BP Location: Left arm, Cuff Size: Adult Regular)   Pulse 88   Temp 97.6  F (36.4  C) (Tympanic)   Ht 1.816 m (5' 11.5\")   Wt 92.1 kg (203 lb)   SpO2 98%   BMI 27.92 kg/m   Estimated body mass index is 27.92 kg/m  as calculated from the following:    Height as of this encounter: 1.816 m (5' 11.5\").    Weight as of this encounter: 92.1 kg (203 lb).  Medication Reconciliation: complete  Madalyn Elizalde LPN    "

## 2022-05-16 NOTE — LETTER
"    5/16/2022         RE: Casey Pedro  [address]  [facility] [address]        Dear Colleague,    Thank you for referring your patient, Casey Pedro, to the [facility]. Please see a copy of my visit note below.    Otolaryngology Progress Note          Casey Pedro is a 41 year old male  Presents for follow-up of chronic recurrent sinusitis.  I last saw him in January and started him on budesonide irrigations.     Lives at a care center and his caretakers had noticed behavior changes including hitting his face and complaint for the facial and nasal pain     No prior nasal surgery no nasal trauma        Since his last visit behavior has improved  No purulence, afebrile    Accompanied by care taker, Deepa    She notes chronic mouth breathing  Did not tolerate budesonide irrigations    Uses flonase 1 spray each nares daily, tolerates this well    He did let me perform endoscopy at 1/3/22 visit        CT sinus dated 11/16/2021 was reviewed     There is a caudal septal deviation to the left and a mid septal deviation to the left polypoid mucoperiosteal thickening of the right frontal bilateral anterior posterior ethmoid maxillary and sphenoid 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  Alert and oriented to person and place, interactive.  Head and Face - Normocephalic and atraumatic, with no gross asymmetry noted of the " contour of the facial features.  The facial nerve is intact, with strong symmetric movements.  Eyes - Extraocular movements intact.   Nose - Nasal mucosa is pink and moist with no abnormal mucus.  dns left, bilateral  inferior turbinate hypertrophy  Mouth - Examination of the oral cavity shows pink, healthy, moist mucosa.  No lesions or ulceration noted.  The dentition are in good repair.  The tongue is mobile and midline.  Mouth breather, poor oral tongue muscle tone  Throat - The walls of the oropharynx were smooth, pink, moist, symmetric, and had no lesions or ulcerations.  The tonsillar pillars and soft palate were symmetric.  The uvula was midline on elevation.        To evaluate the nose and sinuses, I performed rigid nasal endoscopy.  I applied topical nasal lidocaine and neosynephrine.    I began with the LEFT side using a 0 degree rigid nasal endoscope, and then similarly examined the RIGHT side    Findings:  Inferior turbinates:  edematous  Middle turbinate and middle meatus:  No purulence, no polyposis, narrow access  Lateral nasal wall bilaterally due to edema  Superior meatus was evaluated  Mucosa is edematous throughout,  No viz polyps nor polypoid degeneration  Sphenoethmoidal recess without purulence   Nasopharynx clear, et patent, no mass or purulence, no adenoids  The patient tolerated the procedure well      Impression/Plan  Casey Pedro is a 41 year old male    ICD-10-CM    1. Chronic pansinusitis  J32.4    2. Nasal turbinate hypertrophy  J34.3    3. DNS (deviated nasal septum)  J34.2          Use Flonase 2 sprays, once daily  Did not tolerate budesonide irrigations    Contact us if behavior worsen to order an updated CT sinus and discuss surgery     consider bilateral endoscopic sinus surgery, septoplasty, turbinate reduction  Would need staff to perform postoperative budesonide irrigations or require sedated post op debridements if he develops synechiae     The risks and complications of  sinus surgery were openly discussed.  The potential risks include bleeding, general anesthesia, infection, scar formation, need for additional surgery, septal perforation, and rarely injury to the eye with the possibility of blindness,  injury to the brain, meningitis or other intracranial complications.  Nonsurgical options were discussed including prolonged antibioitics and/or oral and topical steroids.   Sinus anatomy and sinus disease were reviewed.  CT sinus was reviewed with the patient.  All questions were answered.      Continue all current medications recommended by me or my staff.     Continue budesonide irrigations.  Verbal and written education on use provided.     The importance of budesonide irrigations postoperatively was reinforced.     The correct use of nasal irrigations as prescribed will prevent synechiae and allow for proper recovery of the sinus mucosa.        Total  Fredy, balloons, propel  Excision janet     Risks of oral steroid use were discussed and include psychiatric/mood changes, insomnia, stomach ulcers and potential GI bleeding, blood sugar elevation/worsening diabetes, hip/bone necrosis called avascular necrosis, or bone demineralization.          Elayne Baumann D.O.  Otolaryngology/Head and Neck Surgery  Allergy              Again, thank you for allowing me to participate in the care of your patient.        Sincerely,        Elayne Baumann MD

## 2022-05-27 DIAGNOSIS — J30.2 CHRONIC SEASONAL ALLERGIC RHINITIS: ICD-10-CM

## 2022-05-27 DIAGNOSIS — E11.9 TYPE 2 DIABETES MELLITUS WITHOUT COMPLICATION, WITHOUT LONG-TERM CURRENT USE OF INSULIN (H): ICD-10-CM

## 2022-05-27 RX ORDER — ASPIRIN 81 MG/1
TABLET, COATED ORAL
Qty: 120 TABLET | Refills: 0 | Status: SHIPPED | OUTPATIENT
Start: 2022-05-27 | End: 2022-10-13

## 2022-05-27 RX ORDER — CETIRIZINE HYDROCHLORIDE 10 MG/1
TABLET ORAL
Qty: 30 TABLET | Refills: 0 | Status: SHIPPED | OUTPATIENT
Start: 2022-05-27 | End: 2022-06-28

## 2022-05-27 NOTE — TELEPHONE ENCOUNTER
Zyrtec  Last Written Prescription Date: 3/1/22  Last Fill Quantity: 30 # of Refills: 1  Last Office Visit: 5/12/22    Aspirin  Last Written Prescription Date: 2/9/22  Last Fill Quantity: 90 # of Refills: 0  Last Office Visit: 5/12/22

## 2022-06-13 ENCOUNTER — TRANSFERRED RECORDS (OUTPATIENT)
Dept: HEALTH INFORMATION MANAGEMENT | Facility: CLINIC | Age: 42
End: 2022-06-13

## 2022-06-13 LAB — RETINOPATHY: NEGATIVE

## 2022-06-23 DIAGNOSIS — K59.01 SLOW TRANSIT CONSTIPATION: ICD-10-CM

## 2022-06-24 DIAGNOSIS — J30.2 CHRONIC SEASONAL ALLERGIC RHINITIS: ICD-10-CM

## 2022-06-24 RX ORDER — POLYETHYLENE GLYCOL 3350 17 G/17G
POWDER, FOR SOLUTION ORAL
Qty: 510 G | Refills: 3 | Status: SHIPPED | OUTPATIENT
Start: 2022-06-24 | End: 2022-12-07

## 2022-06-24 NOTE — TELEPHONE ENCOUNTER
Gavilax  Last Written Prescription Date: 2/22/22  Last Fill Quantity: 510 g # of Refills: 3  Last Office Visit: 6/14/22

## 2022-06-28 RX ORDER — CETIRIZINE HYDROCHLORIDE 10 MG/1
TABLET ORAL
Qty: 30 TABLET | Refills: 11 | Status: SHIPPED | OUTPATIENT
Start: 2022-06-28 | End: 2023-05-09

## 2022-07-06 DIAGNOSIS — E11.9 TYPE 2 DIABETES MELLITUS WITHOUT COMPLICATION, WITHOUT LONG-TERM CURRENT USE OF INSULIN (H): ICD-10-CM

## 2022-07-06 DIAGNOSIS — R10.13 DYSPEPSIA: ICD-10-CM

## 2022-07-08 RX ORDER — METFORMIN HCL 500 MG
500 TABLET, EXTENDED RELEASE 24 HR ORAL
Qty: 30 TABLET | Refills: 0 | Status: SHIPPED | OUTPATIENT
Start: 2022-07-08 | End: 2022-08-04

## 2022-07-18 DIAGNOSIS — E55.9 VITAMIN D DEFICIENCY: ICD-10-CM

## 2022-07-20 RX ORDER — CHOLECALCIFEROL (VITAMIN D3) 50 MCG
TABLET ORAL
Qty: 100 TABLET | Refills: 2 | Status: SHIPPED | OUTPATIENT
Start: 2022-07-20 | End: 2023-01-27

## 2022-08-03 DIAGNOSIS — E11.9 TYPE 2 DIABETES MELLITUS WITHOUT COMPLICATION, WITHOUT LONG-TERM CURRENT USE OF INSULIN (H): ICD-10-CM

## 2022-08-03 DIAGNOSIS — R10.13 DYSPEPSIA: ICD-10-CM

## 2022-08-04 RX ORDER — METFORMIN HCL 500 MG
500 TABLET, EXTENDED RELEASE 24 HR ORAL
Qty: 30 TABLET | Refills: 5 | Status: SHIPPED | OUTPATIENT
Start: 2022-08-04 | End: 2023-01-27

## 2022-08-04 NOTE — TELEPHONE ENCOUNTER
Will route to PCP, Lyly signed last. PCP to address when she returns.     omeprazole (PRILOSEC) 20 MG DR capsule      Last Written Prescription Date:  7/8/22  Last Fill Quantity: 60,   # refills: 0  Last Office Visit: 5/12/22, 6/14/22 appt canceled   Future Office visit:    Next 5 appointments (look out 90 days)    Aug 12, 2022  9:40 AM  (Arrive by 9:25 AM)  Office Visit with Pricilla Henley NP  [facility] ([facility] [place] ) [address]  [place] [address]  [phone]   Sep 12, 2022  3:30 PM  (Arrive by 3:15 PM)  PHYSICAL with Pricilla Henley NP  [facility] [place] ([facility] [place] ) [address]  [place] [address]  [phone]           Metformin       Last Written Prescription Date:  7/8/22  Last Fill Quantity: 30,   # refills: 0

## 2022-08-10 PROBLEM — K21.9 GASTROESOPHAGEAL REFLUX DISEASE WITHOUT ESOPHAGITIS: Status: ACTIVE | Noted: 2022-08-10

## 2022-08-10 PROBLEM — I10 BENIGN ESSENTIAL HYPERTENSION: Status: ACTIVE | Noted: 2022-08-10

## 2022-08-10 PROBLEM — E78.5 HYPERLIPIDEMIA, UNSPECIFIED HYPERLIPIDEMIA TYPE: Status: ACTIVE | Noted: 2022-08-10

## 2022-08-10 NOTE — PROGRESS NOTES
Assessment & Plan     Type 2 diabetes mellitus without complication, without long-term current use of insulin (H)  A1C pending. Will notify patient of the results when available and intervene accordingly. If WNL, will see him back in 6 months. On statin. BP at goal. Eye exam UTD.      Mild intermittent asthma without complication  Controlled. ACT 24. Follow-up 6 months.     Hyperlipidemia, unspecified hyperlipidemia type  Tolerating statin. CMP and lipid panel pending. Will notify patient of the results when available and intervene accordingly.     - Lipid Profile (Chol, Trig, HDL, LDL calc); Future  - Lipid Profile (Chol, Trig, HDL, LDL calc)    Gastroesophageal reflux disease without esophagitis  Controlled with lower dose of omeprazole. Taking 20 mg BID. Will try lowering it to 20 mg daily. Will reassess in 6 months.     Benign essential hypertension  Well controlled. Continue current medications. Encouraged daily exercise and a low sodium diet. Recommended checking BP's 2x/wk, call the clinic if consistantly s>140 or d>90. Follow up in 6 months.       Pricilla Henley NP  [facility] - ISAIAH Peña is a 41 year old accompanied by his group home staff, presenting for the following health issues:  Diabetes, Lipids, Hypertension, and Asthma      HPI     Diabetes Follow-up    How often are you checking your blood sugar? One time daily  What time of day are you checking your blood sugars (select all that apply)?  Before meals  Have you had any blood sugars above 200?  No  Have you had any blood sugars below 70?  No    What symptoms do you notice when your blood sugar is low?  None    What concerns do you have today about your diabetes? None     Do you have any of these symptoms? (Select all that apply)  No numbness or tingling in feet.  No redness, sores or blisters on feet.  No complaints of excessive thirst.  No reports of blurry vision.  No significant changes to weight.    Have you  had a diabetic eye exam in the last 12 months? Yes, done in [place]    A1C was 5.2 on 5/12/22    Taking Metformin 500 mg XR daily. Denies any side effects.     Denies chest pain, shortness of breath, dizziness, syncope, or palpitations.     Diabetic Foot Screen:  Any complaints of increased pain or numbness ? No  Is there a foot ulcer now or a history of foot ulcer? No  Does the foot have an abnormal shape? No  Are the nails thick, too long or ingrown? No  Are there any redness or open areas? No         Sensation Testing done at all points on the diagram with monofilament     Right Foot: Sensation Normal at all points  Left Foot: Sensation Normal at all points     Risk Category: 0- No loss of protective sensation  Performed by  Pricilla Henley NP       Hyperlipidemia Follow-Up      Are you regularly taking any medication or supplement to lower your cholesterol?   Yes- Lipitor 40 mg    Are you having muscle aches or other side effects that you think could be caused by your cholesterol lowering medication?  No     Denies chest pain, shortness of breath, dizziness, syncope, or palpitations.     Hypertension Follow-up      Do you check your blood pressure regularly outside of the clinic? Yes     Are you following a low salt diet? Yes    Are your blood pressures ever more than 140 on the top number (systolic) OR more   than 90 on the bottom number (diastolic), for example 140/90? No   -Taking lisinopril 5 mg without side effects.   -Denies chest pain, shortness of breath, dizziness, syncope, or palpitations    Dyspepsia; last seen on 5/12/22. Was taking omeprazole 40 mg BID. No issues were noted. Medication was lowered to 20 mg BID. Today he notes that his symptoms are controlled. No nausea or vomiting. No melena. No abdominal pain.     Due for the pna 20. Declines.     Asthma Follow-Up    Was ACT completed today?    Yes    ACT Total Scores 8/12/2022   ACT TOTAL SCORE (Goal Greater than or Equal to 20) 24   In the past 12  months, how many times did you visit the emergency room for your asthma without being admitted to the hospital? 0   In the past 12 months, how many times were you hospitalized overnight because of your asthma? 0          How many days per week do you miss taking your asthma controller medication?  I do not have an asthma controller medication    Please describe any recent triggers for your asthma: Unsure    Have you had any Emergency Room Visits, Urgent Care Visits, or Hospital Admissions since your last office visit?  No    Uses albuterol as needed.     BP Readings from Last 2 Encounters:   08/12/22 100/70   05/16/22 112/80     Hemoglobin A1C (%)   Date Value   05/12/2022 5.2   09/08/2021 5.2   07/20/2020 5.3   01/20/2020 5.4     LDL Cholesterol Calculated (mg/dL)   Date Value   09/08/2021 13   09/01/2021 24   01/20/2020 26   05/01/2018     Cannot estimate LDL when triglyceride exceeds 400 mg/dL         Review of Systems   Constitutional, HEENT, cardiovascular, pulmonary, gi and gu systems are negative, except as otherwise noted.      Objective    /70 (BP Location: Left arm, Patient Position: Chair, Cuff Size: Adult Regular)   Pulse 86   Temp 97.2  F (36.2  C) (Tympanic)   Wt 95.3 kg (210 lb)   SpO2 97%   BMI 28.88 kg/m    Body mass index is 28.88 kg/m .  Physical Exam   GENERAL: healthy, alert and no distress  EYES: Eyes grossly normal to inspection, PERRL and conjunctivae and sclerae normal  HENT: ear canals and TM's normal, nose and mouth without ulcers or lesions  NECK: no adenopathy, no asymmetry, masses, or scars and thyroid normal to palpation  RESP: lungs clear to auscultation - no rales, rhonchi or wheezes  CV: regular rate and rhythm, normal S1 S2, no S3 or S4, no murmur, click or rub, no peripheral edema and peripheral pulses strong  ABDOMEN: soft, nontender, no hepatosplenomegaly, no masses and bowel sounds normal  MS: no gross musculoskeletal defects noted, no edema  NEURO: Normal strength  and tone, mentation intact and speech normal  PSYCH: mentation appears normal, affect normal/bright  Diabetic foot exam: normal DP and PT pulses, no trophic changes or ulcerative lesions and normal sensory exam    Labs pending              .  ..

## 2022-08-12 ENCOUNTER — OFFICE VISIT (OUTPATIENT)
Dept: FAMILY MEDICINE | Facility: OTHER | Age: 42
End: 2022-08-12
Attending: NURSE PRACTITIONER
Payer: MEDICARE

## 2022-08-12 VITALS
OXYGEN SATURATION: 97 % | BODY MASS INDEX: 28.88 KG/M2 | WEIGHT: 210 LBS | HEART RATE: 86 BPM | TEMPERATURE: 97.2 F | DIASTOLIC BLOOD PRESSURE: 70 MMHG | SYSTOLIC BLOOD PRESSURE: 100 MMHG

## 2022-08-12 DIAGNOSIS — E78.5 HYPERLIPIDEMIA, UNSPECIFIED HYPERLIPIDEMIA TYPE: ICD-10-CM

## 2022-08-12 DIAGNOSIS — E11.9 TYPE 2 DIABETES MELLITUS WITHOUT COMPLICATION, WITHOUT LONG-TERM CURRENT USE OF INSULIN (H): Primary | ICD-10-CM

## 2022-08-12 DIAGNOSIS — J45.20 MILD INTERMITTENT ASTHMA WITHOUT COMPLICATION: ICD-10-CM

## 2022-08-12 DIAGNOSIS — I10 BENIGN ESSENTIAL HYPERTENSION: ICD-10-CM

## 2022-08-12 DIAGNOSIS — K21.9 GASTROESOPHAGEAL REFLUX DISEASE WITHOUT ESOPHAGITIS: ICD-10-CM

## 2022-08-12 LAB
ALBUMIN SERPL-MCNC: 3.9 G/DL (ref 3.4–5)
ALP SERPL-CCNC: 47 U/L (ref 40–150)
ALT SERPL W P-5'-P-CCNC: 38 U/L (ref 0–70)
ANION GAP SERPL CALCULATED.3IONS-SCNC: 6 MMOL/L (ref 3–14)
AST SERPL W P-5'-P-CCNC: 23 U/L (ref 0–45)
BILIRUB SERPL-MCNC: 0.5 MG/DL (ref 0.2–1.3)
BUN SERPL-MCNC: 8 MG/DL (ref 7–30)
CALCIUM SERPL-MCNC: 8.8 MG/DL (ref 8.5–10.1)
CHLORIDE BLD-SCNC: 101 MMOL/L (ref 94–109)
CHOLEST SERPL-MCNC: 86 MG/DL
CO2 SERPL-SCNC: 26 MMOL/L (ref 20–32)
CREAT SERPL-MCNC: 0.71 MG/DL (ref 0.66–1.25)
EST. AVERAGE GLUCOSE BLD GHB EST-MCNC: 108 MG/DL
FASTING STATUS PATIENT QL REPORTED: NO
GFR SERPL CREATININE-BSD FRML MDRD: >90 ML/MIN/1.73M2
GLUCOSE BLD-MCNC: 128 MG/DL (ref 70–99)
HBA1C MFR BLD: 5.4 % (ref 0–5.6)
HDLC SERPL-MCNC: 40 MG/DL
LDLC SERPL CALC-MCNC: 7 MG/DL
NONHDLC SERPL-MCNC: 46 MG/DL
POTASSIUM BLD-SCNC: 4.3 MMOL/L (ref 3.4–5.3)
PROT SERPL-MCNC: 7.4 G/DL (ref 6.8–8.8)
SODIUM SERPL-SCNC: 133 MMOL/L (ref 133–144)
TRIGL SERPL-MCNC: 196 MG/DL

## 2022-08-12 PROCEDURE — 83036 HEMOGLOBIN GLYCOSYLATED A1C: CPT | Mod: ZL | Performed by: NURSE PRACTITIONER

## 2022-08-12 PROCEDURE — 80053 COMPREHEN METABOLIC PANEL: CPT | Mod: ZL | Performed by: NURSE PRACTITIONER

## 2022-08-12 PROCEDURE — G0463 HOSPITAL OUTPT CLINIC VISIT: HCPCS | Mod: 25

## 2022-08-12 PROCEDURE — 99214 OFFICE O/P EST MOD 30 MIN: CPT | Performed by: NURSE PRACTITIONER

## 2022-08-12 PROCEDURE — 82040 ASSAY OF SERUM ALBUMIN: CPT | Mod: ZL | Performed by: NURSE PRACTITIONER

## 2022-08-12 PROCEDURE — G0463 HOSPITAL OUTPT CLINIC VISIT: HCPCS

## 2022-08-12 PROCEDURE — 80061 LIPID PANEL: CPT | Mod: ZL | Performed by: NURSE PRACTITIONER

## 2022-08-12 PROCEDURE — 36415 COLL VENOUS BLD VENIPUNCTURE: CPT | Mod: ZL | Performed by: NURSE PRACTITIONER

## 2022-08-12 RX ORDER — OLANZAPINE 10 MG/1
TABLET ORAL
COMMUNITY
Start: 2022-07-06 | End: 2022-08-12

## 2022-08-12 ASSESSMENT — PAIN SCALES - GENERAL: PAINLEVEL: NO PAIN (0)

## 2022-08-12 ASSESSMENT — ASTHMA QUESTIONNAIRES
QUESTION_5 LAST FOUR WEEKS HOW WOULD YOU RATE YOUR ASTHMA CONTROL: WELL CONTROLLED
ACT_TOTALSCORE: 24
QUESTION_4 LAST FOUR WEEKS HOW OFTEN HAVE YOU USED YOUR RESCUE INHALER OR NEBULIZER MEDICATION (SUCH AS ALBUTEROL): NOT AT ALL
QUESTION_3 LAST FOUR WEEKS HOW OFTEN DID YOUR ASTHMA SYMPTOMS (WHEEZING, COUGHING, SHORTNESS OF BREATH, CHEST TIGHTNESS OR PAIN) WAKE YOU UP AT NIGHT OR EARLIER THAN USUAL IN THE MORNING: NOT AT ALL
QUESTION_2 LAST FOUR WEEKS HOW OFTEN HAVE YOU HAD SHORTNESS OF BREATH: NOT AT ALL
ACT_TOTALSCORE: 24
QUESTION_1 LAST FOUR WEEKS HOW MUCH OF THE TIME DID YOUR ASTHMA KEEP YOU FROM GETTING AS MUCH DONE AT WORK, SCHOOL OR AT HOME: NONE OF THE TIME

## 2022-08-12 NOTE — LETTER
"My Asthma Action Plan    Name: Casey Pedro   YOB: 1980  Date: 8/12/2022   My doctor: Pricilla Henley NP   My clinic: [facility] [place]        My Rescue Medicine:   Albuterol inhaler (Proair/Ventolin/Proventil HFA)  2-4 puffs EVERY 4 HOURS as needed. Use a spacer if recommended by your provider.   My Asthma Severity:   { :301842::\"Intermittent / Exercise Induced\"}  Know your asthma triggers: { :820285}  Patient is unaware of triggers          GREEN ZONE   Good Control    I feel good    No cough or wheeze    Can work, sleep and play without asthma symptoms       Take your asthma control medicine every day.     1. If exercise triggers your asthma, take your rescue medication    15 minutes before exercise or sports, and    During exercise if you have asthma symptoms  2. Spacer to use with inhaler: If you have a spacer, make sure to use it with your inhaler             YELLOW ZONE Getting Worse  I have ANY of these:    I do not feel good    Cough or wheeze    Chest feels tight    Wake up at night   1. Keep taking your Green Zone medications  2. Start taking your rescue medicine:    every 20 minutes for up to 1 hour. Then every 4 hours for 24-48 hours.  3. If you stay in the Yellow Zone for more than 12-24 hours, contact your doctor.  4. If you do not return to the Green Zone in 12-24 hours or you get worse, start taking your oral steroid medicine if prescribed by your provider.           RED ZONE Medical Alert - Get Help  I have ANY of these:    I feel awful    Medicine is not helping    Breathing getting harder    Trouble walking or talking    Nose opens wide to breathe       1. Take your rescue medicine NOW  2. If your provider has prescribed an oral steroid medicine, start taking it NOW  3. Call your doctor NOW  4. If you are still in the Red Zone after 20 minutes and you have not reached your doctor:    Take your rescue medicine again and    Call 911 or go to the emergency room " right away    See your regular doctor within 2 weeks of an Emergency Room or Urgent Care visit for follow-up treatment.          Annual Reminders:  Meet with Asthma Educator,  Flu Shot in the Fall, consider Pneumonia Vaccination for patients with asthma (aged 19 and older).    Pharmacy:    [facility]  [place], [place] - [address] [address] [place] [facility] [address]    Electronically signed by Pricilla Henely NP   Date: 08/12/22                    Asthma Triggers  How To Control Things That Make Your Asthma Worse    Triggers are things that make your asthma worse.  Look at the list below to help you find your triggers and   what you can do about them. You can help prevent asthma flare-ups by staying away from your triggers.      Trigger                                                          What you can do   Cigarette Smoke  Tobacco smoke can make asthma worse. Do not allow smoking in your home, car or around you.  Be sure no one smokes at a child s day care or school.  If you smoke, ask your health care provider for ways to help you quit.  Ask family members to quit too.  Ask your health care provider for a referral to Quit Plan to help you quit smoking, or call 4-101-884-PLAN.     Colds, Flu, Bronchitis  These are common triggers of asthma. Wash your hands often.  Don t touch your eyes, nose or mouth.  Get a flu shot every year.     Dust Mites  These are tiny bugs that live in cloth or carpet. They are too small to see. Wash sheets and blankets in hot water every week.   Encase pillows and mattress in dust mite proof covers.  Avoid having carpet if you can. If you have carpet, vacuum weekly.   Use a dust mask and HEPA vacuum.   Pollen and Outdoor Mold  Some people are allergic to trees, grass, or weed pollen, or molds. Try to keep your windows closed.  Limit time out doors when pollen count is high.   Ask you health care provider about taking medicine during allergy season.     Animal  Dander  Some people are allergic to skin flakes, urine or saliva from pets with fur or feathers. Keep pets with fur or feathers out of your home.    If you can t keep the pet outdoors, then keep the pet out of your bedroom.  Keep the bedroom door closed.  Keep pets off cloth furniture and away from stuffed toys.     Mice, Rats, and Cockroaches  Some people are allergic to the waste from these pests.   Cover food and garbage.  Clean up spills and food crumbs.  Store grease in the refrigerator.   Keep food out of the bedroom.   Indoor Mold  This can be a trigger if your home has high moisture. Fix leaking faucets, pipes, or other sources of water.   Clean moldy surfaces.  Dehumidify basement if it is damp and smelly.   Smoke, Strong Odors, and Sprays  These can reduce air quality. Stay away from strong odors and sprays, such as perfume, powder, hair spray, paints, smoke incense, paint, cleaning products, candles and new carpet.   Exercise or Sports  Some people with asthma have this trigger. Be active!  Ask your doctor about taking medicine before sports or exercise to prevent symptoms.    Warm up for 5-10 minutes before and after sports or exercise.     Other Triggers of Asthma  Cold air:  Cover your nose and mouth with a scarf.  Sometimes laughing or crying can be a trigger.  Some medicines and food can trigger asthma.

## 2022-08-12 NOTE — NURSING NOTE
"Chief Complaint   Patient presents with     Diabetes     Lipids     Hypertension     Asthma       Initial /70 (BP Location: Left arm, Patient Position: Chair, Cuff Size: Adult Regular)   Pulse 86   Temp 97.2  F (36.2  C) (Tympanic)   Wt 95.3 kg (210 lb)   SpO2 97%   BMI 28.88 kg/m   Estimated body mass index is 28.88 kg/m  as calculated from the following:    Height as of 5/16/22: 1.816 m (5' 11.5\").    Weight as of this encounter: 95.3 kg (210 lb).  Medication Reconciliation: complete  Flaca Cohen LPN  "

## 2022-08-15 DIAGNOSIS — Z00.00 HEALTH CARE MAINTENANCE: ICD-10-CM

## 2022-08-16 DIAGNOSIS — E11.9 TYPE 2 DIABETES MELLITUS WITHOUT COMPLICATION, WITHOUT LONG-TERM CURRENT USE OF INSULIN (H): ICD-10-CM

## 2022-08-17 RX ORDER — MULTIVIT,CALC,MINS/IRON/FOLIC 9MG-400MCG
TABLET ORAL
Qty: 100 CAPSULE | Refills: 7 | Status: SHIPPED | OUTPATIENT
Start: 2022-08-17 | End: 2023-07-31

## 2022-08-18 RX ORDER — ATORVASTATIN CALCIUM 40 MG/1
40 TABLET, FILM COATED ORAL DAILY
Qty: 90 TABLET | Refills: 3 | Status: SHIPPED | OUTPATIENT
Start: 2022-08-18 | End: 2023-08-21

## 2022-08-18 RX ORDER — LISINOPRIL 5 MG/1
5 TABLET ORAL DAILY
Qty: 90 TABLET | Refills: 3 | Status: SHIPPED | OUTPATIENT
Start: 2022-08-18 | End: 2023-08-21

## 2022-09-07 NOTE — PROGRESS NOTES
"SUBJECTIVE:   CC: Casey Pedro is an 41 year old male who presents for preventative health visit.       Patient has been advised of split billing requirements and indicates understanding: Yes  Healthy Habits:     In general, how would you rate your overall health?  Good    Frequency of exercise:  2-3 days/week    Duration of exercise:  15-30 minutes    Do you usually eat at least 4 servings of fruit and vegetables a day, include whole grains    & fiber and avoid regularly eating high fat or \"junk\" foods?  Yes    Taking medications regularly:  Yes    Medication side effects:  Not applicable    Ability to successfully perform activities of daily living:  Telephone requires assistance, transportation requires assistance, shopping requires assistance, preparing meals requires assistance, housework requires assistance, bathing requires assistance, laundry requires assistance, medication administration requires assistance and money management requires assistance    Home Safety:  No safety concerns identified    Hearing Impairment:  No hearing concerns    In the past 6 months, have you been bothered by leaking of urine?  No    In general, how would you rate your overall mental or emotional health?  Good      PHQ-2 Total Score: 0    Additional concerns today:  No    Living in a group home.     GERD; taking omeprazole 20 mg daily. Was recently lowered as he denied any GERD like symptoms. Today he notes that he fells well. No gerd like symptoms. No nausea or vomiting. No melena.     Due for the pneumococcal and influenza vaccines. Declines.       Today's PHQ-2 Score:   PHQ-2 ( 1999 Pfizer) 9/12/2022   Q1: Little interest or pleasure in doing things 0   Q2: Feeling down, depressed or hopeless 0   PHQ-2 Score 0   PHQ-2 Total Score (12-17 Years)- Positive if 3 or more points; Administer PHQ-A if positive -   Q1: Little interest or pleasure in doing things Not at all   Q2: Feeling down, depressed or hopeless Not at all   PHQ-2 " Score 0       Abuse: Current or Past(Physical, Sexual or Emotional)- No  Do you feel safe in your environment? Yes        Social History     Tobacco Use     Smoking status: Never Smoker     Smokeless tobacco: Never Used     Tobacco comment: no passive exposure   Substance Use Topics     Alcohol use: No     If you drink alcohol do you typically have >3 drinks per day or >7 drinks per week? No    Alcohol Use 9/12/2022   Prescreen: >3 drinks/day or >7 drinks/week? No   Prescreen: >3 drinks/day or >7 drinks/week? -       Last PSA: No results found for: PSA    Reviewed orders with patient. Reviewed health maintenance and updated orders accordingly - Yes  BP Readings from Last 3 Encounters:   09/12/22 100/70   08/12/22 100/70   05/16/22 112/80    Wt Readings from Last 3 Encounters:   09/12/22 95.3 kg (210 lb)   08/12/22 95.3 kg (210 lb)   05/16/22 92.1 kg (203 lb)                  Patient Active Problem List   Diagnosis     BPH (begnign prostatic hyperplasia)     Mental retardation     Autism     Annual NHS Examination     Disruptive behavior disorder     Seasonal allergic rhinitis     Mild intermittent asthma without complication     Slow transit constipation     Type 2 diabetes mellitus without complication, without long-term current use of insulin (H)     Hyperlipidemia, unspecified hyperlipidemia type     Gastroesophageal reflux disease without esophagitis     Benign essential hypertension     No past surgical history on file.    Social History     Tobacco Use     Smoking status: Never Smoker     Smokeless tobacco: Never Used     Tobacco comment: no passive exposure   Substance Use Topics     Alcohol use: No     Family History   Problem Relation Age of Onset     Alcohol/Drug Father         alcoholism         Current Outpatient Medications   Medication Sig Dispense Refill     acetaminophen (TYLENOL) 325 MG tablet Take 325-650 mg by mouth every 6 hours as needed for mild pain 1-2 tabs every 4-6 hours as needed for pain  or fever       Alcohol Swabs (B-D SINGLE USE SWABS REGULAR) PADS 1 Application every 4 hours as needed 200 each 3     ASPIRIN LOW DOSE 81 MG EC tablet Take 1 tablet (81 mg) by mouth daily 120 tablet 0     atorvastatin (LIPITOR) 40 MG tablet Take 1 tablet (40 mg) by mouth daily 90 tablet 3     blood glucose (CONTOUR NEXT TEST) test strip Use to test blood sugar 2 times daily or as directed. 50 strip 4     blood glucose (NO BRAND SPECIFIED) test strip Use to test blood sugar 2 times daily or as directed.       blood glucose monitoring (NO BRAND SPECIFIED) meter device kit Use to test blood sugar 2 times daily or as directed. (Patient taking differently: Use to test blood sugar once   daily or as directed.) 1 kit 0     cetirizine (ZYRTEC) 10 MG tablet TAKE ONE (1) TABLET BY MOUTH DAILY 30 tablet 11     divalproex sodium delayed-release (DEPAKOTE) 500 MG DR tablet Take 2 tablets (1,000 mg) by mouth 2 times daily 60 tablet 0     fluticasone (FLONASE) 50 MCG/ACT nasal spray Spray 2 sprays into both nostrils daily 16 g 11     GAVILAX 17 GM/SCOOP powder Take 17 g (1 capful) by mouth daily 510 g 3     guaiFENesin-dextromethorphan (ROBITUSSIN DM) 100-10 MG/5ML syrup Take 5 mLs by mouth every 4 hours as needed for cough 5-15 ml every every 4 hours prn for cough       guanFACINE (TENEX) 1 MG tablet Take 1 tablet by mouth twice a day Take one tablet by mouth every day at 8 am and one tablet by mouth every day at 5 pm 60 tablet 0     ibuprofen (ADVIL/MOTRIN) 800 MG tablet Take 800 mg by mouth every 8 hours as needed for moderate pain       lisinopril (ZESTRIL) 5 MG tablet Take 1 tablet (5 mg) by mouth daily 90 tablet 3     LORazepam (ATIVAN) 1 MG tablet Take 1 tablet (1 mg) by mouth daily (with breakfast) AND 2 tablets (2 mg) daily. Take 1 mg at 7:00 AM and take 2 mg at 3:00 PM. 90 tablet 0     Magnesium Hydroxide (MILK OF MAGNESIA PO) 30 ml (2 tablespoons) once daily as needed       metFORMIN (GLUCOPHAGE XR) 500 MG 24 hr tablet  Take 1 tablet (500 mg) by mouth daily (with dinner) 30 tablet 5     Microlet Lancets MISC Use to test blood sugar 2 times daily or as directed. 90 each 10     OLANZapine (ZYPREXA) 20 MG tablet Take 20 mg by mouth daily Take with the 10mg.       OLANZapine (ZYPREXA) 5 MG tablet Take 5 mg by mouth daily (with lunch)       OLANZapine zydis (ZYPREXA) 10 MG ODT 10 mg every 4 hours as needed Do not exceed twice daily       Omega-3 Fatty Acids (GNP FISH OIL) 1000 MG CAPS TAKE TWO (2) CAPSULES BY MOUTH DAILY 100 capsule 7     omeprazole (PRILOSEC) 20 MG DR capsule Take 1 capsule (20 mg) by mouth 2 times daily 60 capsule 11     pseudoePHEDrine (SUDAFED) 30 MG tablet Take 30 mg by mouth every 4 hours as needed for congestion Take 2 tabs every 4-6 hours prn for congestion       risperiDONE (RISPERDAL) 0.25 MG tablet Take 0.25 mg by mouth 2 times daily  4     risperiDONE (RISPERDAL) 0.5 MG tablet TAKE ONE TABLET BY MOUTH TWICE DAILY along with the 0.25 mg       SENEXON-S 8.6-50 MG tablet Take 1 tablet by mouth 2 times daily 100 tablet 11     UNABLE TO FIND MEDICATION NAME: prune juice, give 8 oz of prune juice daily       UNABLE TO FIND MEDICATION NAME: fleet enema, 1 enema prn for constipation, rectally prn for severe constipation       vitamin D3 (CHOLECALCIFEROL) 50 mcg (2000 units) tablet TAKE ONE TABLET BY MOUTH EVERY DAY (2000UNITS) 100 tablet 2       Reviewed and updated as needed this visit by clinical staff   Tobacco  Allergies  Meds   Med Hx              Reviewed and updated as needed this visit by Provider                   Past Medical History:   Diagnosis Date     Autism 09/16/2011     BPH 09/16/2011     Diabetes mellitus, type 2 (H)      Mental retardation 09/16/2011      No past surgical history on file.    Review of Systems   Constitutional: Negative for chills and fever.   HENT: Negative for congestion, ear pain, hearing loss and sore throat.    Eyes: Negative for pain and visual disturbance.   Respiratory:  "Negative for cough and shortness of breath.    Cardiovascular: Negative for chest pain, palpitations and peripheral edema.   Gastrointestinal: Negative for abdominal pain, constipation, diarrhea, heartburn, hematochezia and nausea.   Genitourinary: Negative for dysuria, frequency, genital sores, hematuria, impotence, penile discharge and urgency.   Musculoskeletal: Negative for arthralgias, joint swelling and myalgias.   Skin: Negative for rash.   Neurological: Negative for dizziness, weakness, headaches and paresthesias.   Psychiatric/Behavioral: Negative for mood changes. The patient is not nervous/anxious.        OBJECTIVE:   /70 (BP Location: Right arm, Patient Position: Chair)   Pulse 76   Temp 97.3  F (36.3  C)   Resp 17   Ht 1.803 m (5' 11\")   Wt 95.3 kg (210 lb)   SpO2 98%   BMI 29.29 kg/m      Physical Exam  GENERAL: healthy, alert and no distress  EYES: Eyes grossly normal to inspection, PERRL and conjunctivae and sclerae normal  HENT: ear canals and TM's normal, nose and mouth without ulcers or lesions  NECK: no adenopathy, no asymmetry, masses, or scars and thyroid normal to palpation  RESP: lungs clear to auscultation - no rales, rhonchi or wheezes  CV: regular rate and rhythm, normal S1 S2, no S3 or S4, no murmur, click or rub, no peripheral edema and peripheral pulses strong  ABDOMEN: soft, nontender, no hepatosplenomegaly, no masses and bowel sounds normal   (male): normal male genitalia without lesions or urethral discharge, no hernia  MS: no gross musculoskeletal defects noted, no edema  SKIN: no suspicious lesions or rashes  NEURO: Normal strength and tone, mentation intact and speech normal  PSYCH: mentation appears normal, affect normal/bright    No labs needed.     ASSESSMENT/PLAN:   (Z00.00) Health maintenance examination  (primary encounter diagnosis)  Plan: No concerns noted. Declines all vaccines. No family history of prostate cancer.     (K21.9) Gastroesophageal reflux " "disease without esophagitis  Plan: famotidine (PEPCID) 20 MG tablet        Controlled with omeprazole. Will stop and try Pepcid daily. Group home staff will let me know if the Pepcid does not help.       Patient has been advised of split billing requirements and indicates understanding: Yes    COUNSELING:   Reviewed preventive health counseling, as reflected in patient instructions       Regular exercise       Healthy diet/nutrition       Vision screening       Hearing screening       Immunizations    Declined: Influenza and Pneumococcal due to Concerns about side effects/safety            Estimated body mass index is 29.29 kg/m  as calculated from the following:    Height as of this encounter: 1.803 m (5' 11\").    Weight as of this encounter: 95.3 kg (210 lb).         He reports that he has never smoked. He has never used smokeless tobacco.      Counseling Resources:  ATP IV Guidelines  Pooled Cohorts Equation Calculator  FRAX Risk Assessment  ICSI Preventive Guidelines  Dietary Guidelines for Americans, 2010  USDA's MyPlate  ASA Prophylaxis  Lung CA Screening    Pricilla Henley NP  [facility] - [place]  "

## 2022-09-12 ENCOUNTER — OFFICE VISIT (OUTPATIENT)
Dept: FAMILY MEDICINE | Facility: OTHER | Age: 42
End: 2022-09-12
Attending: NURSE PRACTITIONER
Payer: MEDICARE

## 2022-09-12 VITALS
DIASTOLIC BLOOD PRESSURE: 70 MMHG | TEMPERATURE: 97.3 F | BODY MASS INDEX: 29.4 KG/M2 | RESPIRATION RATE: 17 BRPM | OXYGEN SATURATION: 98 % | SYSTOLIC BLOOD PRESSURE: 100 MMHG | HEIGHT: 71 IN | HEART RATE: 76 BPM | WEIGHT: 210 LBS

## 2022-09-12 DIAGNOSIS — Z00.00 HEALTH MAINTENANCE EXAMINATION: Primary | ICD-10-CM

## 2022-09-12 DIAGNOSIS — K21.9 GASTROESOPHAGEAL REFLUX DISEASE WITHOUT ESOPHAGITIS: ICD-10-CM

## 2022-09-12 PROCEDURE — 99395 PREV VISIT EST AGE 18-39: CPT | Performed by: NURSE PRACTITIONER

## 2022-09-12 RX ORDER — FAMOTIDINE 20 MG/1
20 TABLET, FILM COATED ORAL DAILY
Qty: 90 TABLET | Refills: 1 | Status: SHIPPED | OUTPATIENT
Start: 2022-09-12 | End: 2023-02-13

## 2022-09-12 ASSESSMENT — PAIN SCALES - GENERAL: PAINLEVEL: NO PAIN (0)

## 2022-09-12 ASSESSMENT — ENCOUNTER SYMPTOMS
FEVER: 0
HEADACHES: 0
MYALGIAS: 0
FREQUENCY: 0
ABDOMINAL PAIN: 0
DIZZINESS: 0
WEAKNESS: 0
HEMATOCHEZIA: 0
CHILLS: 0
SORE THROAT: 0
EYE PAIN: 0
PARESTHESIAS: 0
NERVOUS/ANXIOUS: 0
CONSTIPATION: 0
HEARTBURN: 0
PALPITATIONS: 0
DIARRHEA: 0
JOINT SWELLING: 0
SHORTNESS OF BREATH: 0
NAUSEA: 0
HEMATURIA: 0
ARTHRALGIAS: 0
COUGH: 0
DYSURIA: 0

## 2022-09-12 ASSESSMENT — ACTIVITIES OF DAILY LIVING (ADL)
CURRENT_FUNCTION: MEDICATION ADMINISTRATION REQUIRES ASSISTANCE
CURRENT_FUNCTION: LAUNDRY REQUIRES ASSISTANCE
CURRENT_FUNCTION: BATHING REQUIRES ASSISTANCE
CURRENT_FUNCTION: PREPARING MEALS REQUIRES ASSISTANCE
CURRENT_FUNCTION: MONEY MANAGEMENT REQUIRES ASSISTANCE
CURRENT_FUNCTION: TRANSPORTATION REQUIRES ASSISTANCE
CURRENT_FUNCTION: TELEPHONE REQUIRES ASSISTANCE
CURRENT_FUNCTION: HOUSEWORK REQUIRES ASSISTANCE
CURRENT_FUNCTION: SHOPPING REQUIRES ASSISTANCE

## 2022-09-12 NOTE — NURSING NOTE
"Chief Complaint   Patient presents with     Physical       Initial /70 (BP Location: Right arm, Patient Position: Chair)   Pulse 76   Temp 97.3  F (36.3  C)   Resp 17   Ht 1.803 m (5' 11\")   Wt 95.3 kg (210 lb)   SpO2 98%   BMI 29.29 kg/m   Estimated body mass index is 29.29 kg/m  as calculated from the following:    Height as of this encounter: 1.803 m (5' 11\").    Weight as of this encounter: 95.3 kg (210 lb).  Medication Reconciliation: complete  Cheryl Castro LPN    "

## 2022-10-03 DIAGNOSIS — K59.01 SLOW TRANSIT CONSTIPATION: ICD-10-CM

## 2022-10-04 RX ORDER — ASPIRIN 81 MG
TABLET, DELAYED RELEASE (ENTERIC COATED) ORAL
Qty: 100 TABLET | Refills: 11 | Status: SHIPPED | OUTPATIENT
Start: 2022-10-04 | End: 2024-01-29

## 2022-10-13 DIAGNOSIS — E11.9 TYPE 2 DIABETES MELLITUS WITHOUT COMPLICATION, WITHOUT LONG-TERM CURRENT USE OF INSULIN (H): ICD-10-CM

## 2022-10-13 RX ORDER — ASPIRIN 81 MG/1
TABLET, COATED ORAL
Qty: 120 TABLET | Refills: 0 | Status: SHIPPED | OUTPATIENT
Start: 2022-10-13 | End: 2023-03-14

## 2022-10-24 DIAGNOSIS — E11.9 TYPE 2 DIABETES MELLITUS WITHOUT COMPLICATION, WITHOUT LONG-TERM CURRENT USE OF INSULIN (H): ICD-10-CM

## 2022-10-24 NOTE — TELEPHONE ENCOUNTER
CONTOUR NEXT TEST      Last Written Prescription Date:  1/11/22  Last Fill Quantity: 50,   # refills: 4  Last Office Visit: 9/12/22  Future Office visit:       Routing refill request to provider for review/approval because:

## 2022-10-31 ENCOUNTER — TELEPHONE (OUTPATIENT)
Dept: FAMILY MEDICINE | Facility: OTHER | Age: 42
End: 2022-10-31

## 2022-10-31 DIAGNOSIS — E11.9 TYPE 2 DIABETES MELLITUS WITHOUT COMPLICATION, WITHOUT LONG-TERM CURRENT USE OF INSULIN (H): ICD-10-CM

## 2022-10-31 NOTE — TELEPHONE ENCOUNTER
Staff called and they lost the cap to the diabetic pen and need a new one.  They would like the order sent to [facility] in [place].  Please call the staff at [facility] if it needs to be picked up somewhere else or when the order is sent. [phone] ok to leave message. Unable to use pen, needs it sent ASAP.

## 2022-12-06 DIAGNOSIS — K59.01 SLOW TRANSIT CONSTIPATION: ICD-10-CM

## 2022-12-07 RX ORDER — POLYETHYLENE GLYCOL 3350 17 G/17G
POWDER, FOR SOLUTION ORAL
Qty: 510 G | Refills: 3 | Status: SHIPPED | OUTPATIENT
Start: 2022-12-07 | End: 2023-05-02

## 2022-12-07 NOTE — TELEPHONE ENCOUNTER
GAVILAX      Last Written Prescription Date:  6/24/22  Last Fill Quantity: 510g,   # refills: 3  Last Office Visit: 9/12/22  Future Office visit:    Next 5 appointments (look out 90 days)    Feb 13, 2023  8:20 AM  (Arrive by 8:05 AM)  SHORT with Pricilla Henley NP  [facility] - [place] ([facility] - [place] ) [address]  [place] [address]  [phone]           Routing refill request to provider for review/approval because:

## 2022-12-22 DIAGNOSIS — E11.9 TYPE 2 DIABETES MELLITUS WITHOUT COMPLICATION, WITHOUT LONG-TERM CURRENT USE OF INSULIN (H): ICD-10-CM

## 2022-12-23 RX ORDER — LANCETS
EACH MISCELLANEOUS
Qty: 100 EACH | Refills: 10 | Status: SHIPPED | OUTPATIENT
Start: 2022-12-23 | End: 2024-02-19

## 2023-01-24 DIAGNOSIS — Z79.899 HIGH RISK MEDICATION USE: Primary | ICD-10-CM

## 2023-01-26 DIAGNOSIS — E55.9 VITAMIN D DEFICIENCY: ICD-10-CM

## 2023-01-26 DIAGNOSIS — E11.9 TYPE 2 DIABETES MELLITUS WITHOUT COMPLICATION, WITHOUT LONG-TERM CURRENT USE OF INSULIN (H): ICD-10-CM

## 2023-01-27 RX ORDER — CHOLECALCIFEROL (VITAMIN D3) 50 MCG
TABLET ORAL
Qty: 100 TABLET | Refills: 2 | Status: SHIPPED | OUTPATIENT
Start: 2023-01-27 | End: 2023-12-21

## 2023-01-27 RX ORDER — METFORMIN HCL 500 MG
500 TABLET, EXTENDED RELEASE 24 HR ORAL
Qty: 30 TABLET | Refills: 5 | Status: SHIPPED | OUTPATIENT
Start: 2023-01-27 | End: 2023-02-13

## 2023-01-27 NOTE — TELEPHONE ENCOUNTER
metFORMIN 500mg      Last Written Prescription Date:  8/4/22  Last Fill Quantity: 30,   # refills: 5  Last Office Visit: 9/12/22  Future Office visit:    Next 5 appointments (look out 90 days)    Feb 13, 2023  8:20 AM  (Arrive by 8:05 AM)  SHORT with Pricilla Henley NP  [facility] - [place] ([facility] - [place] ) [address]  [place] [address]  [phone]           Routing refill request to provider for review/approval because:

## 2023-02-08 NOTE — PROGRESS NOTES
Assessment & Plan     Type 2 diabetes mellitus without complication, without long-term current use of insulin (H)  A1c pending. Will notify patient of the results when available and intervene accordingly. On statin. BP at goal. Eye exam UTD. Will reassess 3 months, sooner with new or worsening symptoms.     - Hemoglobin A1c  - Comprehensive metabolic panel (BMP + Alb, Alk Phos, ALT, AST, Total. Bili, TP)  - metFORMIN (GLUCOPHAGE XR) 500 MG 24 hr tablet; Take 1 tablet (500 mg) by mouth daily (with dinner)    Hyperlipidemia, unspecified hyperlipidemia ty/pe  Tolerating statin. Will continue. Lipids controlled in 8/2022.    Benign essential hypertension  Well controlled. Continue current medications. Encouraged daily exercise and a low sodium diet. Recommended checking BP's 2x/wk, call the clinic if consistantly s>140 or d>90. Follow up in 6 months.     Mild intermittent asthma without complication  ACT 25. Controlled. Will recheck 6 months.     Vitamin D deficiency  - Vitamin D Deficiency  Will notify patient of the results when available and intervene accordingly.         No follow-ups on file.    Pricilla Henley NP  [facility] - [place] Peña is a 42 year old accompanied by his sister, presenting for the following health issues:  Asthma, Lipids, Diabetes, and Hypertension      HPI     Diabetes Follow-up    How often are you checking your blood sugar? One time daily  What time of day are you checking your blood sugars (select all that apply)?  mornings  Have you had any blood sugars above 200?  Yes, rare  Have you had any blood sugars below 70?  No    What symptoms do you notice when your blood sugar is low?  Not applicable    What concerns do you have today about your diabetes? None     Do you have any of these symptoms? (Select all that apply)  No numbness or tingling in feet.  No redness, sores or blisters on feet.  No complaints of excessive thirst.  No reports of blurry vision.  No  significant changes to weight.     A1C was 5.4 on 8/12/22.     Taking Metformin 500 mg XR daily. Denies any side effects.     Denies chest pain, shortness of breath, dizziness, syncope, or palpitations.     Due for several vaccines. Declines.     Hyperlipidemia Follow-Up      Are you regularly taking any medication or supplement to lower your cholesterol?   Yes- Lipitor 40 mg    Are you having muscle aches or other side effects that you think could be caused by your cholesterol lowering medication?  No     Denies chest pain, shortness of breath, dizziness, syncope, or palpitations.       Hypertension Follow-up      Do you check your blood pressure regularly outside of the clinic? Yes     Are you following a low salt diet? Yes    Are your blood pressures ever more than 140 on the top number (systolic) OR more   than 90 on the bottom number (diastolic), for example 140/90? No   Taking lisinopril 5 mg without side effects.   Denies chest pain, shortness of breath, dizziness, syncope, or palpitations.    History of Vit D deficiency. Will recheck today.    BP Readings from Last 2 Encounters:   02/13/23 110/68   09/12/22 100/70     Hemoglobin A1C (%)   Date Value   08/12/2022 5.4   05/12/2022 5.2   07/20/2020 5.3   01/20/2020 5.4     LDL Cholesterol Calculated (mg/dL)   Date Value   08/12/2022 7   09/08/2021 13   01/20/2020 26   05/01/2018     Cannot estimate LDL when triglyceride exceeds 400 mg/dL       Asthma Follow-Up    Was ACT completed today?    Yes    ACT Total Scores 2/13/2023   ACT TOTAL SCORE (Goal Greater than or Equal to 20) 25   In the past 12 months, how many times did you visit the emergency room for your asthma without being admitted to the hospital? 0   In the past 12 months, how many times were you hospitalized overnight because of your asthma? 0          How many days per week do you miss taking your asthma controller medication?  I do not have an asthma controller medication    Please describe any  "recent triggers for your asthma: None    Have you had any Emergency Room Visits, Urgent Care Visits, or Hospital Admissions since your last office visit?  No          Review of Systems   Constitutional, HEENT, cardiovascular, pulmonary, gi and gu systems are negative, except as otherwise noted.      Objective    /68 (BP Location: Right arm, Patient Position: Sitting, Cuff Size: Adult Large)   Pulse 82   Temp 97.9  F (36.6  C) (Tympanic)   Ht 1.803 m (5' 11\")   Wt 96.3 kg (212 lb 6.4 oz)   SpO2 99%   BMI 29.62 kg/m    Body mass index is 29.62 kg/m .  Physical Exam   GENERAL: healthy, alert and no distress  EYES: Eyes grossly normal to inspection, PERRL and conjunctivae and sclerae normal  HENT: ear canals and TM's normal, nose and mouth without ulcers or lesions  NECK: no adenopathy, no asymmetry, masses, or scars and thyroid normal to palpation  RESP: lungs clear to auscultation - no rales, rhonchi or wheezes  CV: regular rate and rhythm, no murmur, click or rub, no peripheral edema and peripheral pulses strong  MS: no gross musculoskeletal defects noted, no edema  NEURO: Normal strength and tone, mentation intact and speech normal  PSYCH: mentation appears normal, affect normal/bright  Diabetic foot exam: normal DP and PT pulses, no trophic changes or ulcerative lesions and normal sensory exam    Labs pending              "

## 2023-02-13 ENCOUNTER — OFFICE VISIT (OUTPATIENT)
Dept: FAMILY MEDICINE | Facility: OTHER | Age: 43
End: 2023-02-13
Attending: NURSE PRACTITIONER
Payer: MEDICARE

## 2023-02-13 VITALS
DIASTOLIC BLOOD PRESSURE: 68 MMHG | OXYGEN SATURATION: 99 % | TEMPERATURE: 97.9 F | HEIGHT: 71 IN | SYSTOLIC BLOOD PRESSURE: 110 MMHG | WEIGHT: 212.4 LBS | HEART RATE: 82 BPM | BODY MASS INDEX: 29.73 KG/M2

## 2023-02-13 DIAGNOSIS — N42.9 DISORDER OF PROSTATE: ICD-10-CM

## 2023-02-13 DIAGNOSIS — I10 BENIGN ESSENTIAL HYPERTENSION: ICD-10-CM

## 2023-02-13 DIAGNOSIS — J45.20 MILD INTERMITTENT ASTHMA WITHOUT COMPLICATION: ICD-10-CM

## 2023-02-13 DIAGNOSIS — E55.9 VITAMIN D DEFICIENCY: ICD-10-CM

## 2023-02-13 DIAGNOSIS — E78.5 HYPERLIPIDEMIA, UNSPECIFIED HYPERLIPIDEMIA TYPE: ICD-10-CM

## 2023-02-13 DIAGNOSIS — E11.9 TYPE 2 DIABETES MELLITUS WITHOUT COMPLICATION, WITHOUT LONG-TERM CURRENT USE OF INSULIN (H): Primary | ICD-10-CM

## 2023-02-13 LAB
ALBUMIN SERPL BCG-MCNC: 4.4 G/DL (ref 3.5–5.2)
ALP SERPL-CCNC: 41 U/L (ref 40–129)
ALT SERPL W P-5'-P-CCNC: 29 U/L (ref 10–50)
ANION GAP SERPL CALCULATED.3IONS-SCNC: 9 MMOL/L (ref 7–15)
AST SERPL W P-5'-P-CCNC: 23 U/L (ref 10–50)
BILIRUB SERPL-MCNC: 0.5 MG/DL
BUN SERPL-MCNC: 7.9 MG/DL (ref 6–20)
CALCIUM SERPL-MCNC: 9.3 MG/DL (ref 8.6–10)
CHLORIDE SERPL-SCNC: 99 MMOL/L (ref 98–107)
CREAT SERPL-MCNC: 0.75 MG/DL (ref 0.67–1.17)
DEPRECATED HCO3 PLAS-SCNC: 26 MMOL/L (ref 22–29)
EST. AVERAGE GLUCOSE BLD GHB EST-MCNC: 111 MG/DL
GFR SERPL CREATININE-BSD FRML MDRD: >90 ML/MIN/1.73M2
GLUCOSE SERPL-MCNC: 142 MG/DL (ref 70–99)
HBA1C MFR BLD: 5.5 %
POTASSIUM SERPL-SCNC: 4.1 MMOL/L (ref 3.4–5.3)
PROT SERPL-MCNC: 6.9 G/DL (ref 6.4–8.3)
SODIUM SERPL-SCNC: 134 MMOL/L (ref 136–145)

## 2023-02-13 PROCEDURE — 83036 HEMOGLOBIN GLYCOSYLATED A1C: CPT | Mod: ZL | Performed by: NURSE PRACTITIONER

## 2023-02-13 PROCEDURE — 36415 COLL VENOUS BLD VENIPUNCTURE: CPT | Mod: ZL | Performed by: NURSE PRACTITIONER

## 2023-02-13 PROCEDURE — 82306 VITAMIN D 25 HYDROXY: CPT | Mod: ZL | Performed by: NURSE PRACTITIONER

## 2023-02-13 PROCEDURE — 99214 OFFICE O/P EST MOD 30 MIN: CPT | Performed by: NURSE PRACTITIONER

## 2023-02-13 PROCEDURE — G0463 HOSPITAL OUTPT CLINIC VISIT: HCPCS

## 2023-02-13 PROCEDURE — 80053 COMPREHEN METABOLIC PANEL: CPT | Mod: ZL | Performed by: NURSE PRACTITIONER

## 2023-02-13 RX ORDER — METFORMIN HCL 500 MG
500 TABLET, EXTENDED RELEASE 24 HR ORAL
Qty: 30 TABLET | Refills: 5 | Status: SHIPPED | OUTPATIENT
Start: 2023-02-13 | End: 2023-08-21

## 2023-02-13 RX ORDER — ISOPROPYL ALCOHOL 0.75 G/1
SWAB TOPICAL
Qty: 200 EACH | Refills: 3 | Status: SHIPPED | OUTPATIENT
Start: 2023-02-13 | End: 2024-06-21

## 2023-02-13 ASSESSMENT — ASTHMA QUESTIONNAIRES
QUESTION_2 LAST FOUR WEEKS HOW OFTEN HAVE YOU HAD SHORTNESS OF BREATH: NOT AT ALL
QUESTION_1 LAST FOUR WEEKS HOW MUCH OF THE TIME DID YOUR ASTHMA KEEP YOU FROM GETTING AS MUCH DONE AT WORK, SCHOOL OR AT HOME: NONE OF THE TIME
QUESTION_3 LAST FOUR WEEKS HOW OFTEN DID YOUR ASTHMA SYMPTOMS (WHEEZING, COUGHING, SHORTNESS OF BREATH, CHEST TIGHTNESS OR PAIN) WAKE YOU UP AT NIGHT OR EARLIER THAN USUAL IN THE MORNING: NOT AT ALL
ACT_TOTALSCORE: 25
ACT_TOTALSCORE: 25
QUESTION_5 LAST FOUR WEEKS HOW WOULD YOU RATE YOUR ASTHMA CONTROL: COMPLETELY CONTROLLED
QUESTION_4 LAST FOUR WEEKS HOW OFTEN HAVE YOU USED YOUR RESCUE INHALER OR NEBULIZER MEDICATION (SUCH AS ALBUTEROL): NOT AT ALL

## 2023-02-13 ASSESSMENT — PAIN SCALES - GENERAL: PAINLEVEL: NO PAIN (0)

## 2023-02-13 NOTE — TELEPHONE ENCOUNTER
Alcohol Swabs (B-D Single Use Swabs Regular) Pads   Last Written Prescription Date:  02/13/2023  Last Fill Quantity: 200,   # refills: 3  Last Office Visit: 02/13/2023  Future Office visit:

## 2023-02-14 LAB — DEPRECATED CALCIDIOL+CALCIFEROL SERPL-MC: 38 UG/L (ref 20–75)

## 2023-02-17 NOTE — PROGRESS NOTES
Injectable Influenza Immunization Documentation    1.  Is the person to be vaccinated sick today?   No    2. Does the person to be vaccinated have an allergy to a component   of the vaccine?   No  Egg Allergy Algorithm Link    3. Has the person to be vaccinated ever had a serious reaction   to influenza vaccine in the past?   No    4. Has the person to be vaccinated ever had Guillain-Barré syndrome?   No    Form completed by Manuela Wheeler            Render Path Notes In Note?: No

## 2023-02-27 DIAGNOSIS — K21.9 GASTROESOPHAGEAL REFLUX DISEASE WITHOUT ESOPHAGITIS: ICD-10-CM

## 2023-02-27 RX ORDER — FAMOTIDINE 20 MG/1
20 TABLET, FILM COATED ORAL DAILY
Qty: 90 TABLET | Refills: 1 | Status: SHIPPED | OUTPATIENT
Start: 2023-02-27 | End: 2023-08-31

## 2023-02-27 NOTE — TELEPHONE ENCOUNTER
famotodine 20mg      Last Written Prescription Date:  unknown  Last Fill Quantity: unknown,   # refills: unknown  Last Office Visit: 2/13/23  Future Office visit:       Routing refill request to provider for review/approval because:

## 2023-03-14 DIAGNOSIS — E11.9 TYPE 2 DIABETES MELLITUS WITHOUT COMPLICATION, WITHOUT LONG-TERM CURRENT USE OF INSULIN (H): ICD-10-CM

## 2023-03-14 NOTE — TELEPHONE ENCOUNTER
ASA       Last Written Prescription Date:  10/13/22  Last Fill Quantity: 120,   # refills: 0  Last Office Visit: 2/13/23  Future Office visit:

## 2023-04-19 ENCOUNTER — LAB (OUTPATIENT)
Dept: LAB | Facility: OTHER | Age: 43
End: 2023-04-19
Payer: MEDICARE

## 2023-04-19 DIAGNOSIS — Z79.899 HIGH RISK MEDICATION USE: ICD-10-CM

## 2023-04-19 LAB
ALBUMIN SERPL BCG-MCNC: 4.2 G/DL (ref 3.5–5.2)
ALP SERPL-CCNC: 41 U/L (ref 40–129)
ALT SERPL W P-5'-P-CCNC: 33 U/L (ref 10–50)
AMMONIA PLAS-SCNC: 82 UMOL/L (ref 16–60)
AST SERPL W P-5'-P-CCNC: 25 U/L (ref 10–50)
BASOPHILS # BLD AUTO: 0 10E3/UL (ref 0–0.2)
BASOPHILS NFR BLD AUTO: 1 %
BILIRUB DIRECT SERPL-MCNC: <0.2 MG/DL (ref 0–0.3)
BILIRUB SERPL-MCNC: 0.5 MG/DL
EOSINOPHIL # BLD AUTO: 0.2 10E3/UL (ref 0–0.7)
EOSINOPHIL NFR BLD AUTO: 3 %
ERYTHROCYTE [DISTWIDTH] IN BLOOD BY AUTOMATED COUNT: 12.8 % (ref 10–15)
HCT VFR BLD AUTO: 46 % (ref 40–53)
HGB BLD-MCNC: 16 G/DL (ref 13.3–17.7)
IMM GRANULOCYTES # BLD: 0 10E3/UL
IMM GRANULOCYTES NFR BLD: 0 %
LYMPHOCYTES # BLD AUTO: 2.3 10E3/UL (ref 0.8–5.3)
LYMPHOCYTES NFR BLD AUTO: 50 %
MCH RBC QN AUTO: 29.8 PG (ref 26.5–33)
MCHC RBC AUTO-ENTMCNC: 34.8 G/DL (ref 31.5–36.5)
MCV RBC AUTO: 86 FL (ref 78–100)
MONOCYTES # BLD AUTO: 0.4 10E3/UL (ref 0–1.3)
MONOCYTES NFR BLD AUTO: 9 %
NEUTROPHILS # BLD AUTO: 1.7 10E3/UL (ref 1.6–8.3)
NEUTROPHILS NFR BLD AUTO: 37 %
NRBC # BLD AUTO: 0 10E3/UL
NRBC BLD AUTO-RTO: 0 /100
PLATELET # BLD AUTO: 142 10E3/UL (ref 150–450)
PROT SERPL-MCNC: 6.8 G/DL (ref 6.4–8.3)
RBC # BLD AUTO: 5.37 10E6/UL (ref 4.4–5.9)
VALPROATE SERPL-MCNC: 116.6 UG/ML
WBC # BLD AUTO: 4.6 10E3/UL (ref 4–11)

## 2023-04-19 PROCEDURE — 82140 ASSAY OF AMMONIA: CPT | Mod: ZL

## 2023-04-19 PROCEDURE — 80076 HEPATIC FUNCTION PANEL: CPT | Mod: ZL

## 2023-04-19 PROCEDURE — 36415 COLL VENOUS BLD VENIPUNCTURE: CPT | Mod: ZL

## 2023-04-19 PROCEDURE — 85025 COMPLETE CBC W/AUTO DIFF WBC: CPT | Mod: ZL

## 2023-04-19 PROCEDURE — 80164 ASSAY DIPROPYLACETIC ACD TOT: CPT | Mod: ZL

## 2023-04-28 ENCOUNTER — LAB (OUTPATIENT)
Dept: LAB | Facility: OTHER | Age: 43
End: 2023-04-28
Payer: MEDICARE

## 2023-04-28 DIAGNOSIS — Z79.899 DRUG THERAPY: Primary | ICD-10-CM

## 2023-04-28 LAB
AMMONIA PLAS-SCNC: 67 UMOL/L (ref 16–60)
BASOPHILS # BLD AUTO: 0 10E3/UL (ref 0–0.2)
BASOPHILS NFR BLD AUTO: 1 %
EOSINOPHIL # BLD AUTO: 0.2 10E3/UL (ref 0–0.7)
EOSINOPHIL NFR BLD AUTO: 3 %
ERYTHROCYTE [DISTWIDTH] IN BLOOD BY AUTOMATED COUNT: 12.6 % (ref 10–15)
HCT VFR BLD AUTO: 44.2 % (ref 40–53)
HGB BLD-MCNC: 15.3 G/DL (ref 13.3–17.7)
IMM GRANULOCYTES # BLD: 0 10E3/UL
IMM GRANULOCYTES NFR BLD: 0 %
LYMPHOCYTES # BLD AUTO: 1.8 10E3/UL (ref 0.8–5.3)
LYMPHOCYTES NFR BLD AUTO: 38 %
MCH RBC QN AUTO: 29.9 PG (ref 26.5–33)
MCHC RBC AUTO-ENTMCNC: 34.6 G/DL (ref 31.5–36.5)
MCV RBC AUTO: 87 FL (ref 78–100)
MONOCYTES # BLD AUTO: 0.4 10E3/UL (ref 0–1.3)
MONOCYTES NFR BLD AUTO: 8 %
NEUTROPHILS # BLD AUTO: 2.3 10E3/UL (ref 1.6–8.3)
NEUTROPHILS NFR BLD AUTO: 50 %
NRBC # BLD AUTO: 0 10E3/UL
NRBC BLD AUTO-RTO: 0 /100
PLATELET # BLD AUTO: 147 10E3/UL (ref 150–450)
RBC # BLD AUTO: 5.11 10E6/UL (ref 4.4–5.9)
VALPROATE SERPL-MCNC: 54.4 UG/ML
WBC # BLD AUTO: 4.7 10E3/UL (ref 4–11)

## 2023-04-28 PROCEDURE — 85025 COMPLETE CBC W/AUTO DIFF WBC: CPT | Mod: ZL

## 2023-04-28 PROCEDURE — 82140 ASSAY OF AMMONIA: CPT | Mod: ZL

## 2023-04-28 PROCEDURE — 80164 ASSAY DIPROPYLACETIC ACD TOT: CPT | Mod: ZL

## 2023-04-28 PROCEDURE — 36415 COLL VENOUS BLD VENIPUNCTURE: CPT | Mod: ZL

## 2023-05-01 DIAGNOSIS — K59.01 SLOW TRANSIT CONSTIPATION: ICD-10-CM

## 2023-05-02 RX ORDER — POLYETHYLENE GLYCOL 3350 17 G/17G
POWDER, FOR SOLUTION ORAL
Qty: 510 G | Refills: 3 | Status: SHIPPED | OUTPATIENT
Start: 2023-05-02 | End: 2023-08-31

## 2023-05-08 DIAGNOSIS — J30.2 CHRONIC SEASONAL ALLERGIC RHINITIS: ICD-10-CM

## 2023-05-09 RX ORDER — CETIRIZINE HYDROCHLORIDE 10 MG/1
TABLET ORAL
Qty: 30 TABLET | Refills: 8 | Status: SHIPPED | OUTPATIENT
Start: 2023-05-09 | End: 2024-01-10

## 2023-06-06 NOTE — PROGRESS NOTES
"  Assessment & Plan     Increased ammonia level  Disruptive behavior disorder  Mental retardation  Autism  Patient with mental retardation. Does not appear confused to group home staff. Ammonia levels have been slightly high. Will recheck today. Will notify patient of the results when available and intervene accordingly.     Most likely d/t the depakote. If still high, will refer back to Dr. Villegas     - divalproex sodium delayed-release (DEPAKOTE) 500 MG DR tablet; Take 750 mg twice daily  56}     BMI:   Estimated body mass index is 28.02 kg/m  as calculated from the following:    Height as of 2/13/23: 1.803 m (5' 11\").    Weight as of this encounter: 91.1 kg (200 lb 14.4 oz).       Pricilla Henley NP  [facility] - [place] Pe[place] is a 42 year old, presenting for the following health issues:  Follow Up      HPI     Concern - Requesting ammonia recheck  Onset: ammonia was high on 4/19 and 4/28  Description: patient follows with Dr. Edwards who recently checked an ammonia level and it was high; on Depakote which was recently lowered, follows up here for a recheck  Intensity: mild  Progression of Symptoms:  N/A  Accompanying Signs & Symptoms: none; cognitively delayed, lives in group home, staff have not noticed increased confusion  Previous history of similar problem: no  Precipitating factors:        Worsened by: n/a  Alleviating factors:        Improved by: n/a  Therapies tried and outcome:  none   Liver function is normal.   He is not on a diuretic.   He does take Depakote. Recently lowered. Was taking 1000 mg BID; now taking 750 mg BID.           Review of Systems   Constitutional, HEENT, cardiovascular, pulmonary, gi and gu systems are negative, except as otherwise noted.      Objective    /62   Pulse 86   Temp (!) 96.3  F (35.7  C)   Wt 91.1 kg (200 lb 14.4 oz)   SpO2 98%   BMI 28.02 kg/m    Body mass index is 28.02 kg/m .  Physical Exam   GENERAL: alert and no distress; " often yells out and claps hands  EYES: Eyes grossly normal to inspection, PERRL and conjunctivae and sclerae normal  HENT: ear canals and TM's normal, nose and mouth without ulcers or lesions  NECK: no adenopathy, no asymmetry, masses, or scars and thyroid normal to palpation  RESP: lungs clear to auscultation - no rales, rhonchi or wheezes  CV: regular rate and rhythm, normal S1 S2, no S3 or S4, no murmur, click or rub, no peripheral edema and peripheral pulses strong  NEURO: Normal strength and tone, has mental retardation  PSYCH: mentation appears normal, affect normal/bright    Ammonia level in process

## 2023-06-07 ENCOUNTER — OFFICE VISIT (OUTPATIENT)
Dept: FAMILY MEDICINE | Facility: OTHER | Age: 43
End: 2023-06-07
Attending: NURSE PRACTITIONER
Payer: MEDICARE

## 2023-06-07 VITALS
DIASTOLIC BLOOD PRESSURE: 62 MMHG | HEART RATE: 86 BPM | WEIGHT: 200.9 LBS | BODY MASS INDEX: 28.02 KG/M2 | TEMPERATURE: 96.3 F | SYSTOLIC BLOOD PRESSURE: 108 MMHG | OXYGEN SATURATION: 98 %

## 2023-06-07 DIAGNOSIS — R79.89 INCREASED AMMONIA LEVEL: Primary | ICD-10-CM

## 2023-06-07 DIAGNOSIS — F84.0 AUTISM: ICD-10-CM

## 2023-06-07 DIAGNOSIS — F70 MILD INTELLECTUAL DISABILITY: ICD-10-CM

## 2023-06-07 DIAGNOSIS — F91.9 DISRUPTIVE BEHAVIOR DISORDER: ICD-10-CM

## 2023-06-07 LAB — AMMONIA PLAS-SCNC: 79 UMOL/L (ref 16–60)

## 2023-06-07 PROCEDURE — 82140 ASSAY OF AMMONIA: CPT | Mod: ZL | Performed by: NURSE PRACTITIONER

## 2023-06-07 PROCEDURE — 36415 COLL VENOUS BLD VENIPUNCTURE: CPT | Mod: ZL | Performed by: NURSE PRACTITIONER

## 2023-06-07 PROCEDURE — G0463 HOSPITAL OUTPT CLINIC VISIT: HCPCS

## 2023-06-07 PROCEDURE — 99213 OFFICE O/P EST LOW 20 MIN: CPT | Performed by: NURSE PRACTITIONER

## 2023-06-07 RX ORDER — DIVALPROEX SODIUM 500 MG/1
TABLET, DELAYED RELEASE ORAL
COMMUNITY
Start: 2023-06-07

## 2023-06-07 ASSESSMENT — PAIN SCALES - GENERAL: PAINLEVEL: NO PAIN (0)

## 2023-06-08 DIAGNOSIS — Z79.899 DRUG THERAPY: Primary | ICD-10-CM

## 2023-06-15 ENCOUNTER — TRANSFERRED RECORDS (OUTPATIENT)
Dept: HEALTH INFORMATION MANAGEMENT | Facility: CLINIC | Age: 43
End: 2023-06-15

## 2023-06-15 LAB — RETINOPATHY: NEGATIVE

## 2023-06-21 ENCOUNTER — LAB (OUTPATIENT)
Dept: LAB | Facility: OTHER | Age: 43
End: 2023-06-21
Payer: MEDICARE

## 2023-06-21 DIAGNOSIS — Z79.899 DRUG THERAPY: ICD-10-CM

## 2023-06-21 LAB
AMMONIA PLAS-SCNC: 50 UMOL/L (ref 16–60)
VALPROATE SERPL-MCNC: 73.5 UG/ML

## 2023-06-21 PROCEDURE — 36415 COLL VENOUS BLD VENIPUNCTURE: CPT | Mod: ZL

## 2023-06-21 PROCEDURE — 80164 ASSAY DIPROPYLACETIC ACD TOT: CPT | Mod: ZL

## 2023-06-21 PROCEDURE — 82140 ASSAY OF AMMONIA: CPT | Mod: ZL

## 2023-06-23 DIAGNOSIS — J30.1 ALLERGIC RHINITIS DUE TO POLLEN: ICD-10-CM

## 2023-06-26 ENCOUNTER — TELEPHONE (OUTPATIENT)
Dept: FAMILY MEDICINE | Facility: OTHER | Age: 43
End: 2023-06-26

## 2023-06-26 NOTE — TELEPHONE ENCOUNTER
Casey's caregiver came in and stated that he has lost 24 lbs unintentionally in less than 30 days.  He would like to see Pricilla as soon as possible.  Please let me know if he is able to be worked in sooner than next available.  He is already being seen 8/21/23.

## 2023-06-27 RX ORDER — FLUTICASONE PROPIONATE 50 MCG
2 SPRAY, SUSPENSION (ML) NASAL DAILY
Qty: 16 G | Refills: 0 | Status: SHIPPED | OUTPATIENT
Start: 2023-06-27 | End: 2023-08-18

## 2023-06-27 NOTE — TELEPHONE ENCOUNTER
Pt unable to come this afternoon; provider had opening at 930 on 6/28 advised to have them come then checking in at 845

## 2023-06-27 NOTE — TELEPHONE ENCOUNTER
fluticasone propionate 50 mcg/actuation nasal spray,suspension         Last Written Prescription Date:  5/19/23  Last Fill Quantity: 16 g,   # refills: 0  Last Office Visit: 6/7/23  Future Office visit:    Next 5 appointments (look out 90 days)    Jun 28, 2023  9:30 AM  (Arrive by 9:15 AM)  SHORT with Pricilla Henley NP  [facility] - [place] ([facility] - [place] ) [address]  [place] [address]  [phone]   Aug 21, 2023  8:00 AM  (Arrive by 7:45 AM)  SHORT with Pricilla Henley NP  [facility] [place] ([facility] [place] ) [address]  [place] [address]  [phone]   Sep 13, 2023  8:30 AM  (Arrive by 8:15 AM)  PHYSICAL with Pricilla Henley NP  [facility] [place] ([facility] [place] ) [address]  [place] [address]  [phone]           Routing refill request to provider for review/approval because:    Nasal Allergy Protocol Failed 06/23/2023 11:01 AM   Protocol Details  Recent (12 mo) or future (30 days) visit within the authorizing provider's specialty

## 2023-07-10 ENCOUNTER — MEDICAL CORRESPONDENCE (OUTPATIENT)
Dept: HEALTH INFORMATION MANAGEMENT | Facility: CLINIC | Age: 43
End: 2023-07-10

## 2023-07-11 DIAGNOSIS — Z79.899 HIGH RISK MEDICATION USE: Primary | ICD-10-CM

## 2023-07-24 ENCOUNTER — TRANSFERRED RECORDS (OUTPATIENT)
Dept: HEALTH INFORMATION MANAGEMENT | Facility: CLINIC | Age: 43
End: 2023-07-24

## 2023-07-31 DIAGNOSIS — Z00.00 HEALTH CARE MAINTENANCE: ICD-10-CM

## 2023-07-31 RX ORDER — CHLORAL HYDRATE 500 MG
CAPSULE ORAL
Qty: 100 CAPSULE | Refills: 7 | Status: SHIPPED | OUTPATIENT
Start: 2023-07-31 | End: 2024-09-26

## 2023-07-31 NOTE — TELEPHONE ENCOUNTER
Omega-3 Fatty Acids 1000 mg      Last Written Prescription Date:  08/17/2022  Last Fill Quantity: 100,   # refills: 7  Last Office Visit: 06/07/2023  Future Office visit:    Next 5 appointments (look out 90 days)      Aug 21, 2023  8:00 AM  (Arrive by 7:45 AM)  SHORT with Pricilla Henley NP  [facility] ([facility] [place] ) [address]  [place] [address]  [phone]     Sep 13, 2023  8:30 AM  (Arrive by 8:15 AM)  PHYSICAL with Pricilla Henley NP  [facility] [place] ([facility] [place] ) [address]  [place] [address]  [phone]             Routing refill request to provider for review/approval because:  Drug not on the [facility], P or [facility] refill protocol or controlled substance

## 2023-08-12 NOTE — PROGRESS NOTES
Answers submitted by the patient for this visit:  Patient Health Questionnaire (Submitted on 8/21/2023)  If you checked off any problems, how difficult have these problems made it for you to do your work, take care of things at home, or get along with other people?: Not difficult at all  PHQ9 TOTAL SCORE: 0  SAL-7 (Submitted on 8/21/2023)  SAL 7 TOTAL SCORE: 0    Assessment & Plan     Type 2 diabetes mellitus without complication, without long-term current use of insulin (H)  A1C in process. Will notify patient of the results when available and intervene accordingly. Eye exam up to date. Blood pressure at goal. On statin. Will see him back in 6 months unless A1C poorly controlled.     - Hemoglobin A1c  - Albumin Random Urine Quantitative with Creat Ratio    Benign essential hypertension  Well controlled. Continue current medications. Encouraged daily exercise and a low sodium diet. Recommended checking BP's 2x/wk, call the clinic if consistantly s>140 or d>90. Follow up in 6 months.     - Basic metabolic panel    Hyperlipidemia, unspecified hyperlipidemia type  Tolerating statin. Will recheck lipids today. Will notify patient of the results when available and intervene accordingly. AST and ALT normal in 4/2023.     - Lipid Profile (Chol, Trig, HDL, LDL calc)    Mental retardation  Autism  Presented to group home staff member. Story obtained from her.     Mild intermittent asthma without complication  Controlled. ACT 25.     Vitamin D deficiency  - Vitamin D Deficiency  -Taking supplement. Will recheck levels today. Will notify patient of the results when available and intervene accordingly.       Pricilla Henley NP  [facility] [place] Peña is a 42 year old, presenting for the following health issues:  Hypertension, Diabetes, Lipids, and Asthma    HPI     Diabetes Follow-up    How often are you checking your blood sugar? A few times a week  What time of day are you checking your blood  sugars (select all that apply)?  Before and after meals  Have you had any blood sugars above 200?  No  Have you had any blood sugars below 70?  No  What symptoms do you notice when your blood sugar is low?  None  What concerns do you have today about your diabetes? None   Do you have any of these symptoms? (Select all that apply)  No numbness or tingling in feet.  No redness, sores or blisters on feet.  No complaints of excessive thirst.  No reports of blurry vision.  No significant changes to weight.  Have you had a diabetic eye exam in the last 12 months? Yes- Date of last eye exam: 2023,  Location: [place]   A1C was 5.5 on 2/13/23  Taking Metformin 500 mg XR daily. Denies any side effects.   Denies chest pain, shortness of breath, dizziness, syncope, or palpitations.     Due for several vaccines. Declines.      Hyperlipidemia Follow-Up    Are you regularly taking any medication or supplement to lower your cholesterol?   Yes- Lipitor 40 mg  Are you having muscle aches or other side effects that you think could be caused by your cholesterol lowering medication?  No  Denies chest pain, shortness of breath, dizziness, syncope, or palpitations.    Hypertension Follow-up    Do you check your blood pressure regularly outside of the clinic? Yes   Are you following a low salt diet? Yes  Are your blood pressures ever more than 140 on the top number (systolic) OR more   than 90 on the bottom number (diastolic), for example 140/90? No  Taking lisinopril 5 mg without side effects.   Denies chest pain, shortness of breath, dizziness, syncope, or palpitations.     History of Vit D deficiency. Will recheck today.     Asthma Follow-Up    Was ACT completed today?  Yes        8/21/2023     8:00 AM   ACT Total Scores   ACT TOTAL SCORE (Goal Greater than or Equal to 20) 25   In the past 12 months, how many times did you visit the emergency room for your asthma without being admitted to the hospital? 0   In the past 12 months, how  "many times were you hospitalized overnight because of your asthma? 0        How many days per week do you miss taking your asthma controller medication?  0  Please describe any recent triggers for your asthma: None  Have you had any Emergency Room Visits, Urgent Care Visits, or Hospital Admissions since your last office visit?  No      BP Readings from Last 2 Encounters:   06/28/23 121/83   06/07/23 108/62     Hemoglobin A1C (%)   Date Value   02/13/2023 5.5   08/12/2022 5.4   07/20/2020 5.3   01/20/2020 5.4     LDL Cholesterol Calculated (mg/dL)   Date Value   08/12/2022 7   09/08/2021 13   01/20/2020 26   05/01/2018     Cannot estimate LDL when triglyceride exceeds 400 mg/dL         Review of Systems   Constitutional, HEENT, cardiovascular, pulmonary, gi and gu systems are negative, except as otherwise noted.      Objective    Pulse 84   Temp 97.4  F (36.3  C) (Tympanic)   Resp 18   Ht 1.803 m (5' 11\")   Wt 93.4 kg (206 lb)   SpO2 99%   BMI 28.73 kg/m    Body mass index is 28.73 kg/m .  Physical Exam   GENERAL: healthy, alert and no distress, often yells out  EYES: Eyes grossly normal to inspection, PERRL and conjunctivae and sclerae normal  HENT: ear canals and TM's normal, nose and mouth without ulcers or lesions  NECK: no adenopathy, no asymmetry, masses, or scars and thyroid normal to palpation  RESP: lungs clear to auscultation - no rales, rhonchi or wheezes  CV: regular rate and rhythm, no murmur, click or rub, no peripheral edema and peripheral pulses strong  ABDOMEN: soft, nontender, no hepatosplenomegaly, no masses and bowel sounds normal  MS: no gross musculoskeletal defects noted, no edema  PSYCH: mentation appears normal, affect normal/bright  Diabetic foot exam: normal DP and PT pulses, no trophic changes or ulcerative lesions, and normal sensory exam    Labs in process                  "

## 2023-08-18 DIAGNOSIS — J30.1 ALLERGIC RHINITIS DUE TO POLLEN: ICD-10-CM

## 2023-08-18 RX ORDER — FLUTICASONE PROPIONATE 50 MCG
2 SPRAY, SUSPENSION (ML) NASAL DAILY
Qty: 16 G | Refills: 0 | Status: SHIPPED | OUTPATIENT
Start: 2023-08-18 | End: 2023-10-05

## 2023-08-18 NOTE — TELEPHONE ENCOUNTER
"Flonase 50 MCG/ACT      Last Written Prescription Date:  6/27/23  Last Fill Quantity: 16 g,   # refills: 0  Last Office Visit: 6/7/23  Future Office visit:    Next 5 appointments (look out 90 days)      Aug 21, 2023  8:00 AM  (Arrive by 7:45 AM)  SHORT with Pricilla Henley NP  [facility] ([facility] ) [address]  [place] [address]  [phone]     Sep 27, 2023 11:00 AM  (Arrive by 10:45 AM)  PHYSICAL with Pricilla Henley NP  [facility] ([facility] ) [address]  [place] [address]  [phone]             Routing refill request to provider for review/approval because:  Nasal Allergy Protocol Failed      Recent (12 mo) or future (30 days) visit within the authorizing provider's specialty    Patient has had an office visit with the authorizing provider or a provider within the authorizing providers department within the previous 12 mos or has a future within next 30 days. See \"Patient Info\" tab in inbasket, or \"Choose Columns\" in Meds & Orders section of the refill encounter.      Patient was seen 6/7/23, is still failing protocol     "

## 2023-08-21 ENCOUNTER — OFFICE VISIT (OUTPATIENT)
Dept: FAMILY MEDICINE | Facility: OTHER | Age: 43
End: 2023-08-21
Attending: NURSE PRACTITIONER
Payer: MEDICARE

## 2023-08-21 VITALS
HEART RATE: 84 BPM | WEIGHT: 206 LBS | BODY MASS INDEX: 28.84 KG/M2 | TEMPERATURE: 97.4 F | RESPIRATION RATE: 18 BRPM | OXYGEN SATURATION: 99 % | HEIGHT: 71 IN

## 2023-08-21 DIAGNOSIS — E55.9 VITAMIN D DEFICIENCY: ICD-10-CM

## 2023-08-21 DIAGNOSIS — J45.20 MILD INTERMITTENT ASTHMA WITHOUT COMPLICATION: ICD-10-CM

## 2023-08-21 DIAGNOSIS — F84.0 AUTISM: ICD-10-CM

## 2023-08-21 DIAGNOSIS — E78.5 HYPERLIPIDEMIA, UNSPECIFIED HYPERLIPIDEMIA TYPE: ICD-10-CM

## 2023-08-21 DIAGNOSIS — F70 MILD INTELLECTUAL DISABILITY: ICD-10-CM

## 2023-08-21 DIAGNOSIS — E11.9 TYPE 2 DIABETES MELLITUS WITHOUT COMPLICATION, WITHOUT LONG-TERM CURRENT USE OF INSULIN (H): Primary | ICD-10-CM

## 2023-08-21 DIAGNOSIS — I10 BENIGN ESSENTIAL HYPERTENSION: ICD-10-CM

## 2023-08-21 LAB
ANION GAP SERPL CALCULATED.3IONS-SCNC: 12 MMOL/L (ref 7–15)
BUN SERPL-MCNC: 8 MG/DL (ref 6–20)
CALCIUM SERPL-MCNC: 9.5 MG/DL (ref 8.6–10)
CHLORIDE SERPL-SCNC: 108 MMOL/L (ref 98–107)
CHOLEST SERPL-MCNC: 85 MG/DL
CREAT SERPL-MCNC: 0.64 MG/DL (ref 0.67–1.17)
CREAT UR-MCNC: 22.9 MG/DL
DEPRECATED HCO3 PLAS-SCNC: 22 MMOL/L (ref 22–29)
EST. AVERAGE GLUCOSE BLD GHB EST-MCNC: 117 MG/DL
GFR SERPL CREATININE-BSD FRML MDRD: >90 ML/MIN/1.73M2
GLUCOSE SERPL-MCNC: 154 MG/DL (ref 70–99)
HBA1C MFR BLD: 5.7 %
HDLC SERPL-MCNC: 42 MG/DL
LDLC SERPL CALC-MCNC: 5 MG/DL
MICROALBUMIN UR-MCNC: <12 MG/L
MICROALBUMIN/CREAT UR: NORMAL MG/G{CREAT}
NONHDLC SERPL-MCNC: 43 MG/DL
POTASSIUM SERPL-SCNC: 4.1 MMOL/L (ref 3.4–5.3)
SODIUM SERPL-SCNC: 142 MMOL/L (ref 136–145)
TRIGL SERPL-MCNC: 189 MG/DL

## 2023-08-21 PROCEDURE — 80048 BASIC METABOLIC PNL TOTAL CA: CPT | Mod: ZL | Performed by: NURSE PRACTITIONER

## 2023-08-21 PROCEDURE — 82306 VITAMIN D 25 HYDROXY: CPT | Mod: ZL | Performed by: NURSE PRACTITIONER

## 2023-08-21 PROCEDURE — 36415 COLL VENOUS BLD VENIPUNCTURE: CPT | Mod: ZL | Performed by: NURSE PRACTITIONER

## 2023-08-21 PROCEDURE — 83036 HEMOGLOBIN GLYCOSYLATED A1C: CPT | Mod: ZL | Performed by: NURSE PRACTITIONER

## 2023-08-21 PROCEDURE — G0463 HOSPITAL OUTPT CLINIC VISIT: HCPCS

## 2023-08-21 PROCEDURE — 82570 ASSAY OF URINE CREATININE: CPT | Mod: ZL | Performed by: NURSE PRACTITIONER

## 2023-08-21 PROCEDURE — 80061 LIPID PANEL: CPT | Mod: ZL | Performed by: NURSE PRACTITIONER

## 2023-08-21 PROCEDURE — 99214 OFFICE O/P EST MOD 30 MIN: CPT | Performed by: NURSE PRACTITIONER

## 2023-08-21 RX ORDER — METFORMIN HCL 500 MG
500 TABLET, EXTENDED RELEASE 24 HR ORAL
Qty: 30 TABLET | Refills: 5 | Status: SHIPPED | OUTPATIENT
Start: 2023-08-21 | End: 2024-02-21

## 2023-08-21 RX ORDER — LISINOPRIL 5 MG/1
5 TABLET ORAL DAILY
Qty: 90 TABLET | Refills: 3 | Status: SHIPPED | OUTPATIENT
Start: 2023-08-21 | End: 2024-08-21

## 2023-08-21 RX ORDER — ATORVASTATIN CALCIUM 40 MG/1
40 TABLET, FILM COATED ORAL DAILY
Qty: 90 TABLET | Refills: 3 | Status: SHIPPED | OUTPATIENT
Start: 2023-08-21 | End: 2024-08-21

## 2023-08-21 ASSESSMENT — ANXIETY QUESTIONNAIRES
IF YOU CHECKED OFF ANY PROBLEMS ON THIS QUESTIONNAIRE, HOW DIFFICULT HAVE THESE PROBLEMS MADE IT FOR YOU TO DO YOUR WORK, TAKE CARE OF THINGS AT HOME, OR GET ALONG WITH OTHER PEOPLE: NOT DIFFICULT AT ALL
6. BECOMING EASILY ANNOYED OR IRRITABLE: NOT AT ALL
GAD7 TOTAL SCORE: 0
7. FEELING AFRAID AS IF SOMETHING AWFUL MIGHT HAPPEN: NOT AT ALL
1. FEELING NERVOUS, ANXIOUS, OR ON EDGE: NOT AT ALL
4. TROUBLE RELAXING: NOT AT ALL
3. WORRYING TOO MUCH ABOUT DIFFERENT THINGS: NOT AT ALL
GAD7 TOTAL SCORE: 0
5. BEING SO RESTLESS THAT IT IS HARD TO SIT STILL: NOT AT ALL
2. NOT BEING ABLE TO STOP OR CONTROL WORRYING: NOT AT ALL

## 2023-08-21 ASSESSMENT — PATIENT HEALTH QUESTIONNAIRE - PHQ9
SUM OF ALL RESPONSES TO PHQ QUESTIONS 1-9: 0
SUM OF ALL RESPONSES TO PHQ QUESTIONS 1-9: 0
10. IF YOU CHECKED OFF ANY PROBLEMS, HOW DIFFICULT HAVE THESE PROBLEMS MADE IT FOR YOU TO DO YOUR WORK, TAKE CARE OF THINGS AT HOME, OR GET ALONG WITH OTHER PEOPLE: NOT DIFFICULT AT ALL

## 2023-08-21 ASSESSMENT — PAIN SCALES - GENERAL: PAINLEVEL: NO PAIN (0)

## 2023-08-21 ASSESSMENT — ASTHMA QUESTIONNAIRES: ACT_TOTALSCORE: 25

## 2023-08-21 NOTE — LETTER
August 22, 2023      Casey PAULINE Pedro  [address] [facility]  [place] [address]        Dear ,    We are writing to inform you of your test results.    Normal results, please notify patient   Pricilla Henley NP     Resulted Orders   Hemoglobin A1c   Result Value Ref Range    Estimated Average Glucose 117 mg/dL    Hemoglobin A1C 5.7 (H) <5.7 %      Comment:      Normal <5.7%   Prediabetes 5.7-6.4%    Diabetes 6.5% or higher     Note: Adopted from ADA consensus guidelines.   Basic metabolic panel   Result Value Ref Range    Sodium 142 136 - 145 mmol/L    Potassium 4.1 3.4 - 5.3 mmol/L    Chloride 108 (H) 98 - 107 mmol/L    Carbon Dioxide (CO2) 22 22 - 29 mmol/L    Anion Gap 12 7 - 15 mmol/L    Urea Nitrogen 8.0 6.0 - 20.0 mg/dL    Creatinine 0.64 (L) 0.67 - 1.17 mg/dL    Calcium 9.5 8.6 - 10.0 mg/dL    Glucose 154 (H) 70 - 99 mg/dL    GFR Estimate >90 >60 mL/min/1.73m2   Lipid Profile (Chol, Trig, HDL, LDL calc)   Result Value Ref Range    Cholesterol 85 <200 mg/dL    Triglycerides 189 (H) <150 mg/dL    Direct Measure HDL 42 >=40 mg/dL    LDL Cholesterol Calculated 5 <=100 mg/dL    Non HDL Cholesterol 43 <130 mg/dL    Narrative    Cholesterol  Desirable:  <200 mg/dL    Triglycerides  Normal:  Less than 150 mg/dL  Borderline High:  150-199 mg/dL  High:  200-499 mg/dL  Very High:  Greater than or equal to 500 mg/dL    Direct Measure HDL  Female:  Greater than or equal to 50 mg/dL   Male:  Greater than or equal to 40 mg/dL    LDL Cholesterol  Desirable:  <100mg/dL  Above Desirable:  100-129 mg/dL   Borderline High:  130-159 mg/dL   High:  160-189 mg/dL   Very High:  >= 190 mg/dL    Non HDL Cholesterol  Desirable:  130 mg/dL  Above Desirable:  130-159 mg/dL  Borderline High:  160-189 mg/dL  High:  190-219 mg/dL  Very High:  Greater than or equal to 220 mg/dL   Albumin Random Urine Quantitative with Creat Ratio   Result Value Ref Range    Creatinine Urine mg/dL 22.9 mg/dL      Comment:      The reference ranges have not  been established in urine creatinine. The results should be integrated into the clinical context for interpretation.    Albumin Urine mg/L <12.0 mg/L      Comment:      The reference ranges have not been established in urine albumin. The results should be integrated into the clinical context for interpretation.    Albumin Urine mg/g Cr        Comment:      Unable to calculate, urine albumin and/or urine creatinine is outside detectable limits.  Microalbuminuria is defined as an albumin:creatinine ratio of 17 to 299 for males and 25 to 299 for females. A ratio of albumin:creatinine of 300 or higher is indicative of overt proteinuria.  Due to biologic variability, positive results should be confirmed by a second, first-morning random or 24-hour timed urine specimen. If there is discrepancy, a third specimen is recommended. When 2 out of 3 results are in the microalbuminuria range, this is evidence for incipient nephropathy and warrants increased efforts at glucose control, blood pressure control, and institution of therapy with an angiotensin-converting-enzyme (ACE) inhibitor (if the patient can tolerate it).         If you have any questions or concerns, please call the clinic at the number listed above.       Sincerely,      Pricilla Henley NP

## 2023-08-22 LAB — DEPRECATED CALCIDIOL+CALCIFEROL SERPL-MC: 38 UG/L (ref 20–75)

## 2023-08-29 DIAGNOSIS — K59.01 SLOW TRANSIT CONSTIPATION: ICD-10-CM

## 2023-08-30 NOTE — TELEPHONE ENCOUNTER
Gavilax 17 GM/Scoop powder     Last Written Prescription Date:  05/02/2023  Last Fill Quantity: 510g,   # refills: 3  Last Office Visit: 08/21/2023

## 2023-08-31 DIAGNOSIS — K21.9 GASTROESOPHAGEAL REFLUX DISEASE WITHOUT ESOPHAGITIS: ICD-10-CM

## 2023-08-31 RX ORDER — POLYETHYLENE GLYCOL 3350 17 G/17G
POWDER, FOR SOLUTION ORAL
Qty: 510 G | Refills: 10 | Status: SHIPPED | OUTPATIENT
Start: 2023-08-31 | End: 2024-10-03

## 2023-08-31 RX ORDER — FAMOTIDINE 20 MG/1
20 TABLET, FILM COATED ORAL DAILY
Qty: 90 TABLET | Refills: 1 | Status: SHIPPED | OUTPATIENT
Start: 2023-08-31 | End: 2024-03-04

## 2023-08-31 NOTE — TELEPHONE ENCOUNTER
Famotidine (Pepcid) 20 MG tablet     Last Written Prescription Date:  02/27/2023  Last Fill Quantity: 90,   # refills: 1  Last Office Visit: 08/21/2023

## 2023-09-14 DIAGNOSIS — E11.9 TYPE 2 DIABETES MELLITUS WITHOUT COMPLICATION, WITHOUT LONG-TERM CURRENT USE OF INSULIN (H): ICD-10-CM

## 2023-09-14 NOTE — TELEPHONE ENCOUNTER
blood glucose (CONTOUR NEXT TEST) test strip       Last Written Prescription Date:  10/24/2022  Last Fill Quantity: 50,   # refills: 4  Last Office Visit: 8/21/2023  Future Office visit:    Next 5 appointments (look out 90 days)      Sep 27, 2023 11:00 AM  (Arrive by 10:45 AM)  PHYSICAL with Pricilla Henley NP  [facility] [place] ([facility] [place] ) [address]  [place] [address]  [phone]

## 2023-09-25 ENCOUNTER — LAB (OUTPATIENT)
Dept: LAB | Facility: OTHER | Age: 43
End: 2023-09-25
Payer: MEDICARE

## 2023-09-25 DIAGNOSIS — Z79.899 HIGH RISK MEDICATION USE: ICD-10-CM

## 2023-09-25 LAB
AMMONIA PLAS-SCNC: 48 UMOL/L (ref 16–60)
VALPROATE SERPL-MCNC: 56.5 UG/ML

## 2023-09-25 PROCEDURE — 82140 ASSAY OF AMMONIA: CPT | Mod: ZL

## 2023-09-25 PROCEDURE — 36415 COLL VENOUS BLD VENIPUNCTURE: CPT | Mod: ZL

## 2023-09-25 PROCEDURE — 80164 ASSAY DIPROPYLACETIC ACD TOT: CPT | Mod: ZL

## 2023-10-04 DIAGNOSIS — J30.1 ALLERGIC RHINITIS DUE TO POLLEN: ICD-10-CM

## 2023-10-05 RX ORDER — FLUTICASONE PROPIONATE 50 MCG
2 SPRAY, SUSPENSION (ML) NASAL DAILY
Qty: 16 G | Refills: 0 | Status: SHIPPED | OUTPATIENT
Start: 2023-10-05 | End: 2023-10-30

## 2023-10-05 NOTE — TELEPHONE ENCOUNTER
Flonase      Last Written Prescription Date:  8/18/23  Last Fill Quantity: 16,   # refills: 0  Last Office Visit: 8/21/23  Future Office visit:    Next 5 appointments (look out 90 days)      Nov 01, 2023  9:00 AM  (Arrive by 8:45 AM)  PHYSICAL with Pricilla Henley NP  [facility] [place] ([facility] [place] ) [address]  [place] [address]  [phone]

## 2023-10-27 DIAGNOSIS — J30.1 ALLERGIC RHINITIS DUE TO POLLEN: ICD-10-CM

## 2023-10-27 DIAGNOSIS — E11.9 TYPE 2 DIABETES MELLITUS WITHOUT COMPLICATION, WITHOUT LONG-TERM CURRENT USE OF INSULIN (H): ICD-10-CM

## 2023-10-30 RX ORDER — ASPIRIN 81 MG/1
81 TABLET, COATED ORAL DAILY
Qty: 120 TABLET | Refills: 0 | Status: SHIPPED | OUTPATIENT
Start: 2023-10-30 | End: 2024-02-15

## 2023-10-30 RX ORDER — FLUTICASONE PROPIONATE 50 MCG
2 SPRAY, SUSPENSION (ML) NASAL DAILY
Qty: 16 G | Refills: 0 | Status: SHIPPED | OUTPATIENT
Start: 2023-10-30 | End: 2024-01-09

## 2023-10-30 NOTE — TELEPHONE ENCOUNTER
ASA, Flonase      Last Written Prescription Date:  6.5.23, 10.5.23  Last Fill Quantity: #120, #16g,   # refills: 0  Last Office Visit: 8.21.23  Future Office visit:    Next 5 appointments (look out 90 days)      Nov 01, 2023  9:00 AM  (Arrive by 8:45 AM)  PHYSICAL with Pricilla Henley NP  [facility] [place] ([facility] [place] ) [address]  [place] [address]  [phone]             Routing refill request to provider for review/approval because:

## 2023-10-30 NOTE — PROGRESS NOTES
"SUBJECTIVE:   CC: Casey is an 43 year old who presents for preventative health visit.       Healthy Habits:     In general, how would you rate your overall health?  Good    Frequency of exercise:  2-3 days/week    Duration of exercise:  15-30 minutes    Do you usually eat at least 4 servings of fruit and vegetables a day, include whole grains    & fiber and avoid regularly eating high fat or \"junk\" foods?  Yes    Taking medications regularly:  Yes    Medication side effects:  Not applicable    Ability to successfully perform activities of daily living:  Telephone requires assistance, transportation requires assistance, shopping requires assistance, preparing meals requires assistance, housework requires assistance, medication administration requires assistance and money management requires assistance    Home Safety:  No safety concerns identified    Hearing Impairment:  No hearing concerns    In the past 6 months, have you been bothered by leaking of urine?  No    In general, how would you rate your overall mental or emotional health?  Excellent    Additional concerns today:  No    Patient has Autism. Lives in a group home.     Due for several vaccines. Declines.       Social History     Tobacco Use     Smoking status: Never     Smokeless tobacco: Never     Tobacco comments:     no passive exposure   Substance Use Topics     Alcohol use: No           11/1/2023     8:42 AM   Alcohol Use   Prescreen: >3 drinks/day or >7 drinks/week? Not Applicable       Last PSA: No results found for: \"PSA\"    Reviewed orders with patient. Reviewed health maintenance and updated orders accordingly - Yes  BP Readings from Last 3 Encounters:   11/01/23 102/74   06/28/23 121/83   06/07/23 108/62    Wt Readings from Last 3 Encounters:   11/01/23 96.8 kg (213 lb 4.8 oz)   08/21/23 93.4 kg (206 lb)   06/28/23 91.5 kg (201 lb 12.8 oz)                  Patient Active Problem List   Diagnosis     BPH (begnign prostatic hyperplasia)     Mental " retardation     Autism     Annual [facility] Examination     Disruptive behavior disorder     Seasonal allergic rhinitis     Mild intermittent asthma without complication     Slow transit constipation     Type 2 diabetes mellitus without complication, without long-term current use of insulin (H)     Hyperlipidemia, unspecified hyperlipidemia type     Gastroesophageal reflux disease without esophagitis     Benign essential hypertension     No past surgical history on file.    Social History     Tobacco Use     Smoking status: Never     Smokeless tobacco: Never     Tobacco comments:     no passive exposure   Substance Use Topics     Alcohol use: No     Family History   Problem Relation Age of Onset     Alcohol/Drug Father         alcoholism         Current Outpatient Medications   Medication Sig Dispense Refill     acetaminophen (TYLENOL) 325 MG tablet Take 500 mg by mouth every 6 hours as needed for mild pain 1-2 tabs every 4-6 hours as needed for pain or fever       Alcohol Swabs (B-D SINGLE USE SWABS REGULAR) PADS 1 Application every 4 hours as needed 200 each 3     ASPIRIN LOW DOSE 81 MG EC tablet Take 1 tablet (81 mg) by mouth daily 120 tablet 0     atorvastatin (LIPITOR) 40 MG tablet Take 1 tablet (40 mg) by mouth daily 90 tablet 3     blood glucose (CONTOUR NEXT TEST) test strip Use to test blood sugar 2 times daily or as directed. 50 strip 11     blood glucose (NO BRAND SPECIFIED) test strip Use to test blood sugar 2 times daily or as directed.       blood glucose (NO BRAND SPECIFIED) test strip daily       blood glucose monitoring (NO BRAND SPECIFIED) meter device kit Use to test blood sugar 2 times daily or as directed. (Patient taking differently: daily Use to test blood sugar 2 times daily or as directed.) 1 kit 0     cetirizine (ZYRTEC) 10 MG tablet TAKE ONE (1) TABLET BY MOUTH DAILY 30 tablet 8     divalproex sodium delayed-release (DEPAKOTE) 500 MG DR tablet Take 750 mg twice daily       famotidine (PEPCID) 20  MG tablet Take 1 tablet (20 mg) by mouth daily 90 tablet 1     fish oil-omega-3 fatty acids 1000 MG capsule TAKE TWO (2) CAPSULES BY MOUTH DAILY 100 capsule 7     fluticasone (FLONASE) 50 MCG/ACT nasal spray Spray 2 sprays into both nostrils daily 16 g 0     guanFACINE (TENEX) 1 MG tablet Take 1 tablet by mouth twice a day Take one tablet by mouth every day at 8 am and one tablet by mouth every day at 5 pm 60 tablet 0     ibuprofen (ADVIL/MOTRIN) 800 MG tablet Take 800 mg by mouth every 8 hours as needed for moderate pain       lisinopril (ZESTRIL) 5 MG tablet Take 1 tablet (5 mg) by mouth daily 90 tablet 3     LORazepam (ATIVAN) 1 MG tablet Take 1 tablet (1 mg) by mouth daily (with breakfast) AND 2 tablets (2 mg) daily. Take 1 mg at 7:00 AM and take 2 mg at 3:00 PM. 90 tablet 0     Magnesium Hydroxide (MILK OF MAGNESIA PO) 30 ml (2 tablespoons) once daily as needed       metFORMIN (GLUCOPHAGE XR) 500 MG 24 hr tablet Take 1 tablet (500 mg) by mouth daily (with dinner) 30 tablet 5     Microlet Lancets MISC Use to test blood sugar 2 times daily or as directed. 100 each 10     OLANZapine (ZYPREXA) 20 MG tablet Take 20 mg by mouth daily Take with the 10mg.       OLANZapine (ZYPREXA) 5 MG tablet Take 5 mg by mouth daily (with lunch)       OLANZapine zydis (ZYPREXA) 10 MG ODT 10 mg every 4 hours as needed Do not exceed twice daily       polyethylene glycol (GAVILAX) 17 GM/Dose powder Take 17 g (1 capful) by mouth daily 510 g 10     pseudoePHEDrine (SUDAFED) 30 MG tablet Take 30 mg by mouth every 4 hours as needed for congestion Take 2 tabs every 4-6 hours prn for congestion       risperiDONE (RISPERDAL) 0.25 MG tablet Take 0.25 mg by mouth 2 times daily  4     risperiDONE (RISPERDAL) 0.5 MG tablet TAKE ONE TABLET BY MOUTH TWICE DAILY along with the 0.25 mg       SENNA PLUS 8.6-50 MG tablet TAKE ONE TABLET BY MOUTH TWICE DAILY 100 tablet 11     UNABLE TO FIND MEDICATION NAME: prune juice, give 8 oz of prune juice daily    "    UNABLE TO FIND MEDICATION NAME: fleet enema, 1 enema prn for constipation, rectally prn for severe constipation       vitamin D3 (CHOLECALCIFEROL) 50 mcg (2000 units) tablet TAKE ONE TABLET BY MOUTH EVERY DAY (2000UNITS) 100 tablet 2     divalproex sodium delayed-release (DEPAKOTE) 250 MG DR tablet TAKE ONE TABLET BY MOUTH TWICE DAILY --Take with 500 mg twice daily (Patient not taking: Reported on 11/1/2023)       guaiFENesin-dextromethorphan (ROBITUSSIN DM) 100-10 MG/5ML syrup Take 5 mLs by mouth every 4 hours as needed for cough 5-15 ml every every 4 hours prn for cough (Patient not taking: Reported on 6/28/2023)       omeprazole (PRILOSEC) 20 MG DR capsule Take 1 capsule (20 mg) by mouth 2 times daily (Patient not taking: Reported on 8/21/2023) 60 capsule 11         Past Medical History:   Diagnosis Date     Autism 09/16/2011     BPH 09/16/2011     Diabetes mellitus, type 2 (H)      Mental retardation 09/16/2011      No past surgical history on file.    Review of Systems   Constitutional:  Negative for chills and fever.   HENT:  Negative for congestion, ear pain, hearing loss and sore throat.    Eyes:  Negative for pain and visual disturbance.   Respiratory:  Negative for cough and shortness of breath.    Cardiovascular:  Negative for chest pain, palpitations and peripheral edema.   Gastrointestinal:  Negative for abdominal pain, constipation, diarrhea, heartburn, hematochezia and nausea.   Genitourinary:  Negative for dysuria, frequency, genital sores, hematuria, impotence, penile discharge and urgency.   Musculoskeletal:  Negative for arthralgias, joint swelling and myalgias.   Skin:  Negative for rash.   Neurological:  Negative for dizziness, weakness, headaches and paresthesias.   Psychiatric/Behavioral:  Negative for mood changes. The patient is not nervous/anxious.          OBJECTIVE:   /74   Pulse 70   Temp 96.8  F (36  C) (Tympanic)   Resp 16   Ht 1.803 m (5' 11\")   Wt 96.8 kg (213 lb 4.8 " oz)   SpO2 98%   BMI 29.75 kg/m      Physical Exam  GENERAL: healthy, alert and no distress, often yells out  EYES: Eyes grossly normal to inspection, PERRL and conjunctivae and sclerae normal  HENT: ear canals and TM's normal, nose and mouth without ulcers or lesions  NECK: no adenopathy, no asymmetry, masses, or scars and thyroid normal to palpation  RESP: lungs clear to auscultation - no rales, rhonchi or wheezes  CV: regular rate and rhythm, normal S1 S2, no S3 or S4, no murmur, click or rub, no peripheral edema and peripheral pulses strong  ABDOMEN: soft, nontender, no hepatosplenomegaly, no masses and bowel sounds normal   (male): patient with significant scrotal swelling bilaterally without pain, testicles palpated on each side  MS: no gross musculoskeletal defects noted, no edema  SKIN: no suspicious lesions or rashes  NEURO: Normal strength and tone, mentation intact and speech normal  PSYCH: mentation appears normal, affect normal/bright    No labs needed.     ASSESSMENT/PLAN:   (Z00.00) Health maintenance examination  (primary encounter diagnosis)  Plan: Labs up to date. Declines all vaccines today. Colonoscopy at 47.     (F70) Mental retardation  (F84.0) Autism  (N50.89) Scrotal swelling  Comment: on exam, patient had significant scrotal swelling bilaterally; did not appear in pain, but difficult to assess due to autism; group home staff unsure how long this has been present-he does his own perineal cares  Plan: US Testicular & Scrotum w Doppler Ltd        Offered to do US today, but patient became very worked up. I did call US and they thought they could do it tomorrow. Will wait for the results and then most likely refer to urology.     Patient has been advised of split billing requirements and indicates understanding: Yes      COUNSELING:   Reviewed preventive health counseling, as reflected in patient instructions       Regular exercise       Healthy diet/nutrition       Vision screening        Hearing screening       Immunizations  Declined: Covid-19, Hepatitis B, Influenza, and Pneumococcal due to Other will discuss with guardian             He reports that he has never smoked. He has never used smokeless tobacco.            Pricilla Henley NP  [facility]

## 2023-11-01 ENCOUNTER — OFFICE VISIT (OUTPATIENT)
Dept: FAMILY MEDICINE | Facility: OTHER | Age: 43
End: 2023-11-01
Attending: NURSE PRACTITIONER
Payer: MEDICARE

## 2023-11-01 VITALS
OXYGEN SATURATION: 98 % | BODY MASS INDEX: 29.86 KG/M2 | TEMPERATURE: 96.8 F | WEIGHT: 213.3 LBS | RESPIRATION RATE: 16 BRPM | HEIGHT: 71 IN | HEART RATE: 70 BPM | DIASTOLIC BLOOD PRESSURE: 74 MMHG | SYSTOLIC BLOOD PRESSURE: 102 MMHG

## 2023-11-01 DIAGNOSIS — F84.0 AUTISM: ICD-10-CM

## 2023-11-01 DIAGNOSIS — N50.89 SCROTAL SWELLING: ICD-10-CM

## 2023-11-01 DIAGNOSIS — Z00.00 HEALTH MAINTENANCE EXAMINATION: Primary | ICD-10-CM

## 2023-11-01 DIAGNOSIS — F70 MILD INTELLECTUAL DISABILITY: ICD-10-CM

## 2023-11-01 PROCEDURE — 99396 PREV VISIT EST AGE 40-64: CPT | Performed by: NURSE PRACTITIONER

## 2023-11-01 PROCEDURE — G0463 HOSPITAL OUTPT CLINIC VISIT: HCPCS

## 2023-11-01 ASSESSMENT — ENCOUNTER SYMPTOMS
ARTHRALGIAS: 0
CONSTIPATION: 0
HEADACHES: 0
DIZZINESS: 0
MYALGIAS: 0
FREQUENCY: 0
HEMATOCHEZIA: 0
COUGH: 0
WEAKNESS: 0
PALPITATIONS: 0
JOINT SWELLING: 0
FEVER: 0
DIARRHEA: 0
HEMATURIA: 0
HEARTBURN: 0
SORE THROAT: 0
NAUSEA: 0
CHILLS: 0
PARESTHESIAS: 0
ABDOMINAL PAIN: 0
SHORTNESS OF BREATH: 0
NERVOUS/ANXIOUS: 0
DYSURIA: 0
EYE PAIN: 0

## 2023-11-01 ASSESSMENT — ACTIVITIES OF DAILY LIVING (ADL)
CURRENT_FUNCTION: MEDICATION ADMINISTRATION REQUIRES ASSISTANCE
CURRENT_FUNCTION: TELEPHONE REQUIRES ASSISTANCE
CURRENT_FUNCTION: HOUSEWORK REQUIRES ASSISTANCE
CURRENT_FUNCTION: SHOPPING REQUIRES ASSISTANCE
CURRENT_FUNCTION: TRANSPORTATION REQUIRES ASSISTANCE
CURRENT_FUNCTION: MONEY MANAGEMENT REQUIRES ASSISTANCE
CURRENT_FUNCTION: PREPARING MEALS REQUIRES ASSISTANCE

## 2023-11-01 ASSESSMENT — PAIN SCALES - GENERAL: PAINLEVEL: NO PAIN (0)

## 2023-11-01 NOTE — COMMUNITY RESOURCES LIST (ENGLISH)
11/01/2023   [facility]  N/A  For questions about this resource list or additional care needs, please contact your primary care clinic or care manager.  Phone: [phone]   Email: N/A   Address: [address] [place] [id number]   Hours: N/A        Housing       Coordinated Entry access point  1  [facility] Administrative Office Distance: 16.57 miles      In-Person   [address] [place] [address]  Language: English  Hours: Mon - Fri 8:00 AM - 4:30 PM  Fees: Free   Phone: (979) 798-5751 Email: [email or web address] Website: [email or web address]     Housing search assistance  2  [facility] Shelter Distance: 6.49 miles      In-Person   [address] [place], [place] [id number]  Language: English  Hours: Mon - Sun Open 24 Hours  Fees: Free   Phone: (757) 267-6306 Website: [email or web address]     3  [facility] Administrative Office Distance: 16.57 miles      Phone/Virtual   [address] [place] [address]  Language: English  Hours: Mon - Fri 8:00 AM - 4:30 PM  Fees: Free   Phone: (684) 280-3081 Email: [email or web address] Website: [email or web address]     Shelter for families  4  [facility] Shelter Distance: 6.49 miles      In-Person   [address] [place] [address]  Language: English  Hours: Mon - Sun Open 24 Hours  Fees: Free   Phone: (671) 347-2377 Website: [email or web address]     5  [facility] Distance: 16.65 miles      In-Person   [address] [place] [address]  Language: English  Hours: Mon - Sun Open 24 Hours  Fees: Free   Phone: (184) 902-1624 Website: http://[email or web address]     Shelter for individuals  6  [facility] Shelter Distance: 6.49 miles      In-Person   [address] [place] [address]  Language: English  Hours: Mon - Sun  Open 24 Hours  Fees: Free   Phone: (103) 301-1074 Website: http://www.Spool     7  [employer] [facility] Distance: 16.65 miles      In-Person   [address] [place] [address]  Language: English  Hours: Mon - Sun Open 24 Hours  Fees: Free   Phone: (884) 976-4187 Website: http://wwwROLI          Important Numbers & Websites       Emergency Services   911  City Services   311  Poison Control   (292) 436-8913  Suicide Prevention Lifeline   (567) 463-4853 (TALK)  Child Abuse Hotline   (333) 586-4913 (4-A-Child)  Sexual Assault Hotline   (407) 254-2844 (HOPE)  National Runaway Safeline   (527) 134-7148 (RUNAWAY)  All-Options Talkline   (612) 258-2078  Substance Abuse Referral   (894) 961-9141 (HELP)

## 2023-11-02 ENCOUNTER — TELEPHONE (OUTPATIENT)
Dept: FAMILY MEDICINE | Facility: OTHER | Age: 43
End: 2023-11-02

## 2023-11-02 ENCOUNTER — HOSPITAL ENCOUNTER (OUTPATIENT)
Dept: ULTRASOUND IMAGING | Facility: HOSPITAL | Age: 43
Discharge: HOME | End: 2023-11-02
Attending: NURSE PRACTITIONER | Admitting: NURSE PRACTITIONER
Payer: MEDICARE

## 2023-11-02 DIAGNOSIS — N13.30 BILATERAL HYDRONEPHROSIS: Primary | ICD-10-CM

## 2023-11-02 DIAGNOSIS — N50.89 SCROTAL SWELLING: ICD-10-CM

## 2023-11-02 PROCEDURE — 76870 US EXAM SCROTUM: CPT

## 2023-11-13 ENCOUNTER — TELEPHONE (OUTPATIENT)
Dept: FAMILY MEDICINE | Facility: OTHER | Age: 43
End: 2023-11-13

## 2023-11-13 NOTE — TELEPHONE ENCOUNTER
Spoke with caretaker. He is scheduled for a urology consult this Wednesday for kidney stones, she is wondering why. Per the understanding from the patient she states that on 11/1 he had a US and was unable to complete it.

## 2023-11-14 NOTE — TELEPHONE ENCOUNTER
Could you call caretaker and explain what a hydrocele is? They had the understand he was being sent to the urologist for kidney stones.

## 2023-11-15 NOTE — TELEPHONE ENCOUNTER
LVM to address caregivers questions  Patient had tian't today with Urology per note  Asked for caregiver to call back to Nurse Line with any further questions or concerns

## 2023-12-21 DIAGNOSIS — E55.9 VITAMIN D DEFICIENCY: ICD-10-CM

## 2023-12-21 RX ORDER — CHOLECALCIFEROL (VITAMIN D3) 50 MCG
TABLET ORAL
Qty: 100 TABLET | Refills: 2 | Status: SHIPPED | OUTPATIENT
Start: 2023-12-21

## 2023-12-21 NOTE — TELEPHONE ENCOUNTER
VITAMIN D      Last Written Prescription Date:  1-26-23  Last Fill Quantity: 100,   # refills: 2  Last Office Visit: 11-1-23  Future Office visit:    Next 5 appointments (look out 90 days)      Feb 21, 2024  8:00 AM  (Arrive by 7:45 AM)  SHORT with Pricilla Henley NP  [facility] ([facility] ) [address]  [place] [address]  [phone]             Routing refill request to provider for review/approval because:  Drug not on the [facility], [facility] or [facility] refill protocol or controlled substance

## 2024-01-08 DIAGNOSIS — J30.1 ALLERGIC RHINITIS DUE TO POLLEN: ICD-10-CM

## 2024-01-08 NOTE — TELEPHONE ENCOUNTER
Flonase  Last Written Prescription Date: 10/30/23  Last Fill Quantity: 16 g # of Refills: 0  Last Office Visit: 11/1/23

## 2024-01-09 DIAGNOSIS — J30.2 CHRONIC SEASONAL ALLERGIC RHINITIS: ICD-10-CM

## 2024-01-09 RX ORDER — FLUTICASONE PROPIONATE 50 MCG
2 SPRAY, SUSPENSION (ML) NASAL DAILY
Qty: 16 G | Refills: 0 | Status: SHIPPED | OUTPATIENT
Start: 2024-01-09 | End: 2024-02-01

## 2024-01-10 RX ORDER — CETIRIZINE HYDROCHLORIDE 10 MG/1
TABLET ORAL
Qty: 30 TABLET | Refills: 8 | Status: SHIPPED | OUTPATIENT
Start: 2024-01-10 | End: 2024-09-09

## 2024-01-10 NOTE — TELEPHONE ENCOUNTER
Cetirizine (Zyrtec) 10 MG tablet    Last Written Prescription Date:  05/09/2023  Last Fill Quantity: 30,   # refills: 8  Last Office Visit: 11/01/2023

## 2024-01-29 DIAGNOSIS — K59.01 SLOW TRANSIT CONSTIPATION: ICD-10-CM

## 2024-01-29 RX ORDER — AMOXICILLIN 250 MG
1 CAPSULE ORAL 2 TIMES DAILY
Qty: 100 TABLET | Refills: 11 | Status: SHIPPED | OUTPATIENT
Start: 2024-01-29

## 2024-01-29 NOTE — TELEPHONE ENCOUNTER
Senna Plus  Last Written Prescription Date: 10/4/22  Last Fill Quantity: 100 # of Refills: 11  Last Office Visit: 11/1/23

## 2024-02-01 DIAGNOSIS — J30.1 ALLERGIC RHINITIS DUE TO POLLEN: ICD-10-CM

## 2024-02-01 RX ORDER — FLUTICASONE PROPIONATE 50 MCG
2 SPRAY, SUSPENSION (ML) NASAL DAILY
Qty: 16 G | Refills: 1 | Status: SHIPPED | OUTPATIENT
Start: 2024-02-01 | End: 2024-03-25

## 2024-02-01 NOTE — TELEPHONE ENCOUNTER
Flonase        Last Written Prescription Date:  1/9/24  Last Fill Quantity: 16g,   # refills: 0  Last Office Visit: 11/1/23  Future Office visit:    Next 5 appointments (look out 90 days)      Feb 21, 2024  8:00 AM  (Arrive by 7:45 AM)  SHORT with Pricilla Henley NP  [facility] [place] ([facility] [place] ) [address]  [place] [address]  [phone]             Routing refill request to provider for review/approval because:

## 2024-02-13 ENCOUNTER — HOSPITAL ENCOUNTER (EMERGENCY)
Facility: HOSPITAL | Age: 44
Discharge: HOME | End: 2024-02-13
Attending: NURSE PRACTITIONER | Admitting: NURSE PRACTITIONER
Payer: MEDICARE

## 2024-02-13 ENCOUNTER — APPOINTMENT (OUTPATIENT)
Dept: GENERAL RADIOLOGY | Facility: HOSPITAL | Age: 44
End: 2024-02-13
Attending: NURSE PRACTITIONER
Payer: MEDICARE

## 2024-02-13 VITALS
WEIGHT: 213.4 LBS | SYSTOLIC BLOOD PRESSURE: 119 MMHG | HEIGHT: 74 IN | OXYGEN SATURATION: 96 % | TEMPERATURE: 97.6 F | HEART RATE: 102 BPM | DIASTOLIC BLOOD PRESSURE: 85 MMHG | RESPIRATION RATE: 18 BRPM | BODY MASS INDEX: 27.39 KG/M2

## 2024-02-13 DIAGNOSIS — M54.50 LOWER BACK PAIN: Primary | ICD-10-CM

## 2024-02-13 LAB
ALBUMIN UR-MCNC: NEGATIVE MG/DL
APPEARANCE UR: CLEAR
BILIRUB UR QL STRIP: NEGATIVE
COLOR UR AUTO: ABNORMAL
GLUCOSE UR STRIP-MCNC: 500 MG/DL
HGB UR QL STRIP: NEGATIVE
KETONES UR STRIP-MCNC: 20 MG/DL
LEUKOCYTE ESTERASE UR QL STRIP: NEGATIVE
MUCOUS THREADS #/AREA URNS LPF: PRESENT /LPF
NITRATE UR QL: NEGATIVE
PH UR STRIP: 5.5 [PH] (ref 4.7–8)
RBC URINE: <1 /HPF
SP GR UR STRIP: 1.02 (ref 1–1.03)
SQUAMOUS EPITHELIAL: 0 /HPF
UROBILINOGEN UR STRIP-MCNC: NORMAL MG/DL
WBC URINE: <1 /HPF

## 2024-02-13 PROCEDURE — 81001 URINALYSIS AUTO W/SCOPE: CPT | Performed by: NURSE PRACTITIONER

## 2024-02-13 PROCEDURE — G0463 HOSPITAL OUTPT CLINIC VISIT: HCPCS

## 2024-02-13 PROCEDURE — 99213 OFFICE O/P EST LOW 20 MIN: CPT | Performed by: NURSE PRACTITIONER

## 2024-02-13 PROCEDURE — 250N000013 HC RX MED GY IP 250 OP 250 PS 637: Performed by: NURSE PRACTITIONER

## 2024-02-13 PROCEDURE — 72100 X-RAY EXAM L-S SPINE 2/3 VWS: CPT

## 2024-02-13 RX ORDER — LOPERAMIDE HCL 2 MG
2 CAPSULE ORAL
COMMUNITY

## 2024-02-13 RX ORDER — SENNOSIDES A AND B 8.6 MG/1
8.6 TABLET, FILM COATED ORAL
COMMUNITY

## 2024-02-13 RX ORDER — LIDOCAINE 4 G/G
1 PATCH TOPICAL ONCE
Status: DISCONTINUED | OUTPATIENT
Start: 2024-02-13 | End: 2024-02-13 | Stop reason: HOSPADM

## 2024-02-13 RX ORDER — LIDOCAINE 4 G/G
1 PATCH TOPICAL EVERY 24 HOURS
Qty: 15 PATCH | Refills: 0 | Status: SHIPPED | OUTPATIENT
Start: 2024-02-13 | End: 2024-04-30

## 2024-02-13 RX ORDER — CALCIUM CARBONATE 500 MG/1
500 TABLET, CHEWABLE ORAL
COMMUNITY

## 2024-02-13 RX ADMIN — LIDOCAINE 1 PATCH: 4 PATCH TOPICAL at 10:26

## 2024-02-13 ASSESSMENT — ENCOUNTER SYMPTOMS
PSYCHIATRIC NEGATIVE: 1
DYSURIA: 0
DIARRHEA: 0
FEVER: 0
VOMITING: 0
NECK STIFFNESS: 0
NECK PAIN: 0
CHILLS: 0
SHORTNESS OF BREATH: 0
ABDOMINAL PAIN: 0

## 2024-02-13 NOTE — ED TRIAGE NOTES
Caregivers brings pt in with c/o bilateral lower back pain. Pt started c/o back pain over the weekend. Pt is autistic so can only give minimal information. Denies any known falls, trauma, or injuries. Pt has had a UTI before. Pt had tylenol this morning.

## 2024-02-13 NOTE — ED PROVIDER NOTES
History     Chief Complaint   Patient presents with    Back Pain     Bilateral lower back pain     HPI  Casey Pedro is a 43 year old male who presents to urgent care today ambulatory accompanied by 2 staff members with complaints of lower back pain.  Denies any recent fall, injury or trauma.  No known fevers.  No known changes to bowel habits.  Full ROM of back.  Up walking around urgent care room.  Took Tylenol this morning for pain.  Denies any dysuria, history of UTIs.  No other concerns.    Allergies:  No Known Allergies    Problem List:    Patient Active Problem List    Diagnosis Date Noted    Hyperlipidemia, unspecified hyperlipidemia type 08/10/2022     Priority: Medium    Gastroesophageal reflux disease without esophagitis 08/10/2022     Priority: Medium    Benign essential hypertension 08/10/2022     Priority: Medium    Type 2 diabetes mellitus without complication, without long-term current use of insulin (H) 04/24/2019     Priority: Medium    Mild intermittent asthma without complication 02/20/2018     Priority: Medium    Slow transit constipation 02/20/2018     Priority: Medium    Seasonal allergic rhinitis 09/27/2016     Priority: Medium    Disruptive behavior disorder 08/11/2016     Priority: Medium    Annual NHS Examination 01/06/2015     Priority: Medium    BPH (begnign prostatic hyperplasia) 09/16/2011     Priority: Medium    Mental retardation 09/16/2011     Priority: Medium    Autism 09/16/2011     Priority: Medium        Past Medical History:    Past Medical History:   Diagnosis Date    Autism 09/16/2011    BPH 09/16/2011    Diabetes mellitus, type 2 (H)     Mental retardation 09/16/2011       Past Surgical History:    No past surgical history on file.    Family History:    Family History   Problem Relation Age of Onset    Alcohol/Drug Father         alcoholism       Social History:  Marital Status:  Single [1]  Social History     Tobacco Use    Smoking status: Never    Smokeless tobacco:  Never    Tobacco comments:     no passive exposure   Vaping Use    Vaping Use: Never used   Substance Use Topics    Alcohol use: No    Drug use: No        Medications:    acetaminophen (TYLENOL) 325 MG tablet  Alcohol Swabs (B-D SINGLE USE SWABS REGULAR) PADS  ASPIRIN LOW DOSE 81 MG EC tablet  atorvastatin (LIPITOR) 40 MG tablet  Black Elderberry (SAMBUCOL BLACK ELDERBERRY) 1.9 GM/5ML SYRP  blood glucose (CONTOUR NEXT TEST) test strip  blood glucose (NO BRAND SPECIFIED) test strip  blood glucose (NO BRAND SPECIFIED) test strip  blood glucose monitoring (NO BRAND SPECIFIED) meter device kit  calcium carbonate (TUMS) 500 MG chewable tablet  cetirizine (ZYRTEC) 10 MG tablet  divalproex sodium delayed-release (DEPAKOTE) 500 MG DR tablet  famotidine (PEPCID) 20 MG tablet  fish oil-omega-3 fatty acids 1000 MG capsule  fluticasone (FLONASE) 50 MCG/ACT nasal spray  guanFACINE (TENEX) 1 MG tablet  ibuprofen (ADVIL/MOTRIN) 800 MG tablet  Lidocaine (LIDOCARE) 4 % Patch  lisinopril (ZESTRIL) 5 MG tablet  loperamide (IMODIUM) 2 MG capsule  LORazepam (ATIVAN) 1 MG tablet  Magnesium Hydroxide (MILK OF MAGNESIA PO)  metFORMIN (GLUCOPHAGE XR) 500 MG 24 hr tablet  Microlet Lancets MISC  OLANZapine (ZYPREXA) 20 MG tablet  OLANZapine (ZYPREXA) 5 MG tablet  OLANZapine zydis (ZYPREXA) 10 MG ODT  polyethylene glycol (GAVILAX) 17 GM/Dose powder  pseudoePHEDrine (SUDAFED) 30 MG tablet  risperiDONE (RISPERDAL) 0.25 MG tablet  risperiDONE (RISPERDAL) 0.5 MG tablet  senna (SENOKOT) 8.6 MG tablet  senna-docusate (SENOKOT-S/PERICOLACE) 8.6-50 MG tablet  UNABLE TO FIND  UNABLE TO FIND  VITAMIN D3 50 MCG (2000 UT) tablet  divalproex sodium delayed-release (DEPAKOTE) 250 MG DR tablet  guaiFENesin-dextromethorphan (ROBITUSSIN DM) 100-10 MG/5ML syrup  omeprazole (PRILOSEC) 20 MG DR capsule      Review of Systems   Constitutional:  Negative for chills and fever.   Respiratory:  Negative for shortness of breath.    Cardiovascular:  Negative for chest  "pain.   Gastrointestinal:  Negative for abdominal pain, diarrhea and vomiting.   Genitourinary:  Negative for dysuria.   Musculoskeletal:  Negative for gait problem, neck pain and neck stiffness.        Lower back pain   Skin: Negative.    Psychiatric/Behavioral: Negative.       Physical Exam   BP: 119/85  Pulse: 102  Temp: 97.6  F (36.4  C)  Resp: 18  Height: 188 cm (6' 2\")  Weight: 96.8 kg (213 lb 6.4 oz)  SpO2: 96 %  AP: 96    Physical Exam  Vitals and nursing note reviewed.   Constitutional:       General: He is not in acute distress.     Appearance: Normal appearance. He is not ill-appearing or toxic-appearing.   Cardiovascular:      Rate and Rhythm: Normal rate and regular rhythm.      Pulses: Normal pulses.      Heart sounds: Normal heart sounds.   Pulmonary:      Effort: Pulmonary effort is normal.      Breath sounds: Normal breath sounds.   Abdominal:      General: Bowel sounds are normal.      Palpations: Abdomen is soft.      Tenderness: There is no right CVA tenderness or left CVA tenderness.   Musculoskeletal:      Cervical back: Normal.      Thoracic back: Normal.      Lumbar back: Tenderness present. No swelling, edema, deformity, signs of trauma, lacerations or bony tenderness. Normal range of motion.   Skin:     General: Skin is warm and dry.   Neurological:      Mental Status: He is alert.   Psychiatric:         Mood and Affect: Mood normal.       ED Course     Results for orders placed or performed during the hospital encounter of 02/13/24 (from the past 24 hour(s))   UA with Microscopic reflex to Culture    Specimen: Urine, Midstream   Result Value Ref Range    Color Urine Light Yellow Colorless, Straw, Light Yellow, Yellow    Appearance Urine Clear Clear    Glucose Urine 500 (A) Negative mg/dL    Bilirubin Urine Negative Negative    Ketones Urine 20 (A) Negative mg/dL    Specific Gravity Urine 1.023 1.003 - 1.035    Blood Urine Negative Negative    pH Urine 5.5 4.7 - 8.0    Protein Albumin Urine " Negative Negative mg/dL    Urobilinogen Urine Normal Normal, 2.0 mg/dL    Nitrite Urine Negative Negative    Leukocyte Esterase Urine Negative Negative    Mucus Urine Present (A) None Seen /LPF    RBC Urine <1 <=2 /HPF    WBC Urine <1 <=5 /HPF    Squamous Epithelials Urine 0 <=1 /HPF    Narrative    Urine Culture not indicated   Lumbar spine XR, 2-3 views    Narrative    XR LUMBAR SPINE 2/3 VIEWS    HISTORY: lower back pain .    COMPARISON: 3/3/2011.    TECHNIQUE: 3 views of the lumbosacral spine.    FINDINGS:    The lumbar lordosis is preserved. There is slight leftward coronal  curvature. Lumbar vertebral body heights are preserved. Multilevel  lumbar disc height loss is most pronounced at L1-2 and L5-S1. Facet  hypertrophy is most pronounced at L4-5 and L5-S1. Left greater than  right SI joint degeneration is seen.        Impression    IMPRESSION:     No acute fracture. Multifocal degenerative changes.      DEISI GIRARD MD         SYSTEM ID:  [id number]       Medications   Lidocaine (LIDOCARE) 4 % Patch 1 patch (1 patch Transdermal $Patch/Med Applied 2/13/24 1026)     Assessments & Plan (with Medical Decision Making)     I have reviewed the nursing notes.    I have reviewed the findings, diagnosis, plan and need for follow up with the patient.  (M54.50) Lower back pain  (primary encounter diagnosis)  Plan:   Patient ambulatory with a nontoxic appearance.  Patient able to stand from a seated position in order to ambulate without any signs of difficulty.  Full ROM of back.  On exam patient noted to have lower back pain primarily to right SI joint area.  X-ray shows no fracture and left greater than right SI joint degeneration seen, palpated left 3-4 times without any complaints of pain.  No spinal tenderness.  No skin abnormalities.  UA negative for urinary tract infection.  Lidoderm patch placed to right lower back, prescription sent to pharmacy.  On papers brought from patient's home has as needed orders for  Tylenol and ibuprofen to use, patient to alternate Tylenol and ibuprofen as needed for pain.  Follow-up with primary care provider or return to urgent care/ED with any worsening in condition or additional concerns.  Patient and staff members in agreement with treatment plan.    Discharge Medication List as of 2/13/2024 10:34 AM        START taking these medications    Details   Lidocaine (LIDOCARE) 4 % Patch Place 1 patch onto the skin every 24 hours To prevent lidocaine toxicity, patient should be patch free for 12 hrs daily.Disp-15 patch, [id number]           Final diagnoses:   Lower back pain     2/13/2024   [facility]       Brii Craven, DANIELE  02/13/24 [id number]

## 2024-02-13 NOTE — DISCHARGE INSTRUCTIONS
Lidoderm patch as needed for right lower back pain (patch on twelve hours and off for twelve hours when using)    Rest    Gentle stretching    Alternate tylenol and ibuprofen as needed on PRN/OTC order list which was brought with today    Follow-up with primary care provider or return to urgent care/ED with any worsening in condition or additional concerns.

## 2024-02-15 DIAGNOSIS — E11.9 TYPE 2 DIABETES MELLITUS WITHOUT COMPLICATION, WITHOUT LONG-TERM CURRENT USE OF INSULIN (H): ICD-10-CM

## 2024-02-15 RX ORDER — ASPIRIN 81 MG/1
81 TABLET, COATED ORAL DAILY
Qty: 120 TABLET | Refills: 0 | Status: SHIPPED | OUTPATIENT
Start: 2024-02-15 | End: 2024-06-20

## 2024-02-15 NOTE — TELEPHONE ENCOUNTER
Aspirin      Last Written Prescription Date:  10/30/23  Last Fill Quantity: 120,   # refills: 0  Last Office Visit: 11/1/23  Future Office visit:    Next 5 appointments (look out 90 days)      Feb 21, 2024  8:00 AM  (Arrive by 7:45 AM)  SHORT with Pricilla Henley NP  [facility] ([facility] [place] ) [address]  [place] [address]  [phone]             Routing refill request to provider for review/approval because:

## 2024-02-15 NOTE — TELEPHONE ENCOUNTER
Lancets      Last Written Prescription Date:  12/23/22  Last Fill Quantity: 100,   # refills: 10  Last Office Visit: 11/1/23  Future Office visit:    Next 5 appointments (look out 90 days)      Feb 21, 2024  8:00 AM  (Arrive by 7:45 AM)  SHORT with Pricilla Henley NP  [facility] [place] ([facility] [place] ) [address]  [place] [address]  [phone]             Routing refill request to provider for review/approval because:

## 2024-02-19 RX ORDER — LANCETS
EACH MISCELLANEOUS
Qty: 100 EACH | Refills: 3 | Status: SHIPPED | OUTPATIENT
Start: 2024-02-19

## 2024-02-19 NOTE — PROGRESS NOTES
"  Assessment & Plan     (E11.9) Type 2 diabetes mellitus without complication, without long-term current use of insulin (H)  (primary encounter diagnosis)  Plan: A1c in process. Will notify patient of the results when available and intervene accordingly. BP at goal. On statin. Eye exam UTD. Will see him back in 6 months.     (I10) Benign essential hypertension  Plan: Well controlled. Continue current medications. Encouraged daily exercise and a low sodium diet. Recommended checking BP's 2x/wk, call the clinic if consistantly s>140 or d>90. Follow up in 6 months.       (E78.5) Hyperlipidemia, unspecified hyperlipidemia type  Plan: Tolerating statin. Will continue. CMP and lipids in process. Will notify patient of the results when available and intervene accordingly.     (J45.20) Mild intermittent asthma without complication  Plan: Controlled. Will see him back in 6 months.     (D69.6) Thrombocytopenia (H24)  Comment: mild  Plan: Will recheck CBC today. Most likely med induced.         BMI  Estimated body mass index is 27.15 kg/m  as calculated from the following:    Height as of this encounter: 1.88 m (6' 2\").    Weight as of this encounter: 95.9 kg (211 lb 8 oz).           Silva Peña is a 43 year old, presenting for the following health issues:  Diabetes, Lipids, Hypertension, and Asthma    HPI     Diabetes Follow-up    How often are you checking your blood sugar? Once daily  Blood sugar testing frequency justification:  Adjustment of medication(s)  What time of day are you checking your blood sugars (select all that apply)?   Some days its in the morning and the next day they check his bs in the afternoon  Have you had any blood sugars above 200?  No  Have you had any blood sugars below 70?  No  What symptoms do you notice when your blood sugar is low?  None;unable to verably state, but per staff he shows signs when its low   What concerns do you have today about your diabetes? None   Do you have any of " these symptoms? (Select all that apply)  No numbness or tingling in feet.  No redness, sores or blisters on feet.  No complaints of excessive thirst.  No reports of blurry vision.  No significant changes to weight.  Taking Metformin 500 XR daily without side effects.   Denies chest pain, shortness of breath, dizziness, syncope, or palpitations.      Due for several vaccines. Declines. Will make a follow-up appointment for the vaccines.       Hyperlipidemia Follow-Up    Are you regularly taking any medication or supplement to lower your cholesterol?   Yes- Lipitor 40 mg  Are you having muscle aches or other side effects that you think could be caused by your cholesterol lowering medication?  No  Denies chest pain, shortness of breath, dizziness, syncope, or palpitations.      Hypertension Follow-up    Do you check your blood pressure regularly outside of the clinic? Yes   Are you following a low salt diet? Yes  Are your blood pressures ever more than 140 on the top number (systolic) OR more   than 90 on the bottom number (diastolic), for example 140/90? No  Taking lisinopril 5 mg without side effects.     BP Readings from Last 2 Encounters:   02/21/24 110/70   02/13/24 119/85     Hemoglobin A1C (%)   Date Value   08/21/2023 5.7 (H)   02/13/2023 5.5   07/20/2020 5.3   01/20/2020 5.4     LDL Cholesterol Calculated (mg/dL)   Date Value   08/21/2023 5   08/12/2022 7   01/20/2020 26   05/01/2018     Cannot estimate LDL when triglyceride exceeds 400 mg/dL         Asthma Follow-Up    Was ACT completed today?  Yes        2/21/2024     7:42 AM   ACT Total Scores   ACT TOTAL SCORE (Goal Greater than or Equal to 20) 25   In the past 12 months, how many times did you visit the emergency room for your asthma without being admitted to the hospital? 0   In the past 12 months, how many times were you hospitalized overnight because of your asthma? 0        How many days per week do you miss taking your asthma controller medication?   "0  Please describe any recent triggers for your asthma: Patient is unaware of triggers  Have you had any Emergency Room Visits, Urgent Care Visits, or Hospital Admissions since your last office visit?  No      Review of Systems  Constitutional, HEENT, cardiovascular, pulmonary, gi and gu systems are negative, except as otherwise noted.      Objective    /70 (BP Location: Left arm, Patient Position: Sitting, Cuff Size: Adult Regular)   Pulse 98   Temp 97.3  F (36.3  C) (Tympanic)   Ht 1.88 m (6' 2\")   Wt 95.9 kg (211 lb 8 oz)   SpO2 98%   BMI 27.15 kg/m    Body mass index is 27.15 kg/m .  Physical Exam   GENERAL: alert and no distress, cognitively delayed, often yells out  EYES: Eyes grossly normal to inspection, PERRL and conjunctivae and sclerae normal  HENT: ear canals and TM's normal, nose and mouth without ulcers or lesions  NECK: no adenopathy, no asymmetry, masses, or scars  RESP: lungs clear to auscultation - no rales, rhonchi or wheezes  CV: regular rate and rhythm, no murmur, click or rub, no peripheral edema  MS: no gross musculoskeletal defects noted, no edema  PSYCH: mentation appears normal, affect normal/bright  Diabetic foot exam: normal DP and PT pulses, no trophic changes or ulcerative lesions, and normal sensory exam    Labs in process    Signed Electronically by: Pricilla Henley NP    "

## 2024-02-21 ENCOUNTER — OFFICE VISIT (OUTPATIENT)
Dept: FAMILY MEDICINE | Facility: OTHER | Age: 44
End: 2024-02-21
Attending: NURSE PRACTITIONER
Payer: MEDICARE

## 2024-02-21 VITALS
DIASTOLIC BLOOD PRESSURE: 70 MMHG | SYSTOLIC BLOOD PRESSURE: 110 MMHG | WEIGHT: 211.5 LBS | TEMPERATURE: 97.3 F | BODY MASS INDEX: 27.14 KG/M2 | HEART RATE: 98 BPM | HEIGHT: 74 IN | OXYGEN SATURATION: 98 %

## 2024-02-21 DIAGNOSIS — I10 BENIGN ESSENTIAL HYPERTENSION: ICD-10-CM

## 2024-02-21 DIAGNOSIS — D69.6 THROMBOCYTOPENIA (H): ICD-10-CM

## 2024-02-21 DIAGNOSIS — J45.20 MILD INTERMITTENT ASTHMA WITHOUT COMPLICATION: ICD-10-CM

## 2024-02-21 DIAGNOSIS — E11.9 TYPE 2 DIABETES MELLITUS WITHOUT COMPLICATION, WITHOUT LONG-TERM CURRENT USE OF INSULIN (H): Primary | ICD-10-CM

## 2024-02-21 DIAGNOSIS — E78.5 HYPERLIPIDEMIA, UNSPECIFIED HYPERLIPIDEMIA TYPE: ICD-10-CM

## 2024-02-21 LAB
ALBUMIN SERPL BCG-MCNC: 4.5 G/DL (ref 3.5–5.2)
ALP SERPL-CCNC: 54 U/L (ref 40–150)
ALT SERPL W P-5'-P-CCNC: 63 U/L (ref 0–70)
ANION GAP SERPL CALCULATED.3IONS-SCNC: 12 MMOL/L (ref 7–15)
AST SERPL W P-5'-P-CCNC: 29 U/L (ref 0–45)
BASOPHILS # BLD AUTO: 0 10E3/UL (ref 0–0.2)
BASOPHILS NFR BLD AUTO: 1 %
BILIRUB SERPL-MCNC: 0.5 MG/DL
BUN SERPL-MCNC: 10.7 MG/DL (ref 6–20)
CALCIUM SERPL-MCNC: 9.5 MG/DL (ref 8.6–10)
CHLORIDE SERPL-SCNC: 103 MMOL/L (ref 98–107)
CHOLEST SERPL-MCNC: 92 MG/DL
CREAT SERPL-MCNC: 0.72 MG/DL (ref 0.67–1.17)
DEPRECATED HCO3 PLAS-SCNC: 24 MMOL/L (ref 22–29)
EGFRCR SERPLBLD CKD-EPI 2021: >90 ML/MIN/1.73M2
EOSINOPHIL # BLD AUTO: 0.2 10E3/UL (ref 0–0.7)
EOSINOPHIL NFR BLD AUTO: 3 %
ERYTHROCYTE [DISTWIDTH] IN BLOOD BY AUTOMATED COUNT: 12.5 % (ref 10–15)
EST. AVERAGE GLUCOSE BLD GHB EST-MCNC: 114 MG/DL
FASTING STATUS PATIENT QL REPORTED: NO
GLUCOSE SERPL-MCNC: 184 MG/DL (ref 70–99)
HBA1C MFR BLD: 5.6 %
HCT VFR BLD AUTO: 46.6 % (ref 40–53)
HDLC SERPL-MCNC: 37 MG/DL
HGB BLD-MCNC: 16.5 G/DL (ref 13.3–17.7)
IMM GRANULOCYTES # BLD: 0 10E3/UL
IMM GRANULOCYTES NFR BLD: 0 %
LDLC SERPL CALC-MCNC: 23 MG/DL
LYMPHOCYTES # BLD AUTO: 1.9 10E3/UL (ref 0.8–5.3)
LYMPHOCYTES NFR BLD AUTO: 35 %
MCH RBC QN AUTO: 29.3 PG (ref 26.5–33)
MCHC RBC AUTO-ENTMCNC: 35.4 G/DL (ref 31.5–36.5)
MCV RBC AUTO: 83 FL (ref 78–100)
MONOCYTES # BLD AUTO: 0.4 10E3/UL (ref 0–1.3)
MONOCYTES NFR BLD AUTO: 8 %
NEUTROPHILS # BLD AUTO: 2.8 10E3/UL (ref 1.6–8.3)
NEUTROPHILS NFR BLD AUTO: 53 %
NONHDLC SERPL-MCNC: 55 MG/DL
NRBC # BLD AUTO: 0 10E3/UL
NRBC BLD AUTO-RTO: 0 /100
PLATELET # BLD AUTO: 198 10E3/UL (ref 150–450)
POTASSIUM SERPL-SCNC: 4.3 MMOL/L (ref 3.4–5.3)
PROT SERPL-MCNC: 7.1 G/DL (ref 6.4–8.3)
RBC # BLD AUTO: 5.64 10E6/UL (ref 4.4–5.9)
SODIUM SERPL-SCNC: 139 MMOL/L (ref 135–145)
TRIGL SERPL-MCNC: 159 MG/DL
WBC # BLD AUTO: 5.4 10E3/UL (ref 4–11)

## 2024-02-21 PROCEDURE — 85025 COMPLETE CBC W/AUTO DIFF WBC: CPT | Mod: ZL | Performed by: NURSE PRACTITIONER

## 2024-02-21 PROCEDURE — 80061 LIPID PANEL: CPT | Mod: ZL | Performed by: NURSE PRACTITIONER

## 2024-02-21 PROCEDURE — 99214 OFFICE O/P EST MOD 30 MIN: CPT | Performed by: NURSE PRACTITIONER

## 2024-02-21 PROCEDURE — 80053 COMPREHEN METABOLIC PANEL: CPT | Mod: ZL | Performed by: NURSE PRACTITIONER

## 2024-02-21 PROCEDURE — 36415 COLL VENOUS BLD VENIPUNCTURE: CPT | Mod: ZL | Performed by: NURSE PRACTITIONER

## 2024-02-21 PROCEDURE — 83036 HEMOGLOBIN GLYCOSYLATED A1C: CPT | Mod: ZL | Performed by: NURSE PRACTITIONER

## 2024-02-21 PROCEDURE — G0463 HOSPITAL OUTPT CLINIC VISIT: HCPCS | Performed by: NURSE PRACTITIONER

## 2024-02-21 RX ORDER — METFORMIN HCL 500 MG
500 TABLET, EXTENDED RELEASE 24 HR ORAL
Qty: 90 TABLET | Refills: 3 | Status: SHIPPED | OUTPATIENT
Start: 2024-02-21

## 2024-02-21 ASSESSMENT — ASTHMA QUESTIONNAIRES
ACT_TOTALSCORE: 25
ACT_TOTALSCORE: 25
QUESTION_5 LAST FOUR WEEKS HOW WOULD YOU RATE YOUR ASTHMA CONTROL: COMPLETELY CONTROLLED
QUESTION_3 LAST FOUR WEEKS HOW OFTEN DID YOUR ASTHMA SYMPTOMS (WHEEZING, COUGHING, SHORTNESS OF BREATH, CHEST TIGHTNESS OR PAIN) WAKE YOU UP AT NIGHT OR EARLIER THAN USUAL IN THE MORNING: NOT AT ALL
QUESTION_2 LAST FOUR WEEKS HOW OFTEN HAVE YOU HAD SHORTNESS OF BREATH: NOT AT ALL
QUESTION_4 LAST FOUR WEEKS HOW OFTEN HAVE YOU USED YOUR RESCUE INHALER OR NEBULIZER MEDICATION (SUCH AS ALBUTEROL): NOT AT ALL
QUESTION_1 LAST FOUR WEEKS HOW MUCH OF THE TIME DID YOUR ASTHMA KEEP YOU FROM GETTING AS MUCH DONE AT WORK, SCHOOL OR AT HOME: NONE OF THE TIME

## 2024-02-21 ASSESSMENT — PAIN SCALES - GENERAL: PAINLEVEL: NO PAIN (0)

## 2024-02-21 NOTE — LETTER
February 21, 2024      Casey Pedor  [address] [address]        Dear ,    We are writing to inform you of your test results.    CBC normal.   A1c normal at 5.6.   Lipids controlled.   Liver and kidney function normal.     Resulted Orders   Hemoglobin A1c   Result Value Ref Range    Estimated Average Glucose 114 mg/dL    Hemoglobin A1C 5.6 <5.7 %      Comment:      Normal <5.7%   Prediabetes 5.7-6.4%    Diabetes 6.5% or higher     Note: Adopted from ADA consensus guidelines.   Comprehensive metabolic panel (BMP + Alb, Alk Phos, ALT, AST, Total. Bili, TP)   Result Value Ref Range    Sodium 139 135 - 145 mmol/L      Comment:      Reference intervals for this test were updated on 09/26/2023 to more accurately reflect our healthy population. There may be differences in the flagging of prior results with similar values performed with this method. Interpretation of those prior results can be made in the context of the updated reference intervals.     Potassium 4.3 3.4 - 5.3 mmol/L    Carbon Dioxide (CO2) 24 22 - 29 mmol/L    Anion Gap 12 7 - 15 mmol/L    Urea Nitrogen 10.7 6.0 - 20.0 mg/dL    Creatinine 0.72 0.67 - 1.17 mg/dL    GFR Estimate >90 >60 mL/min/1.73m2    Calcium 9.5 8.6 - 10.0 mg/dL    Chloride 103 98 - 107 mmol/L    Glucose 184 (H) 70 - 99 mg/dL    Alkaline Phosphatase 54 40 - 150 U/L      Comment:      Reference intervals for this test were updated on 11/14/2023 to more accurately reflect our healthy population. There may be differences in the flagging of prior results with similar values performed with this method. Interpretation of those prior results can be made in the context of the updated reference intervals.    AST 29 0 - 45 U/L      Comment:      Reference intervals for this test were updated on 6/12/2023 to more accurately reflect our healthy population. There may be differences in the flagging of prior results with similar values performed with this method. Interpretation  of those prior results can be made in the context of the updated reference intervals.    ALT 63 0 - 70 U/L      Comment:      Reference intervals for this test were updated on 6/12/2023 to more accurately reflect our healthy population. There may be differences in the flagging of prior results with similar values performed with this method. Interpretation of those prior results can be made in the context of the updated reference intervals.      Protein Total 7.1 6.4 - 8.3 g/dL    Albumin 4.5 3.5 - 5.2 g/dL    Bilirubin Total 0.5 <=1.2 mg/dL   Lipid Profile (Chol, Trig, HDL, LDL calc)   Result Value Ref Range    Cholesterol 92 <200 mg/dL    Triglycerides 159 (H) <150 mg/dL    Direct Measure HDL 37 (L) >=40 mg/dL    LDL Cholesterol Calculated 23 <=100 mg/dL    Non HDL Cholesterol 55 <130 mg/dL    Patient Fasting > 8hrs? No     Narrative    Cholesterol  Desirable:  <200 mg/dL    Triglycerides  Normal:  Less than 150 mg/dL  Borderline High:  150-199 mg/dL  High:  200-499 mg/dL  Very High:  Greater than or equal to 500 mg/dL    Direct Measure HDL  Female:  Greater than or equal to 50 mg/dL   Male:  Greater than or equal to 40 mg/dL    LDL Cholesterol  Desirable:  <100mg/dL  Above Desirable:  100-129 mg/dL   Borderline High:  130-159 mg/dL   High:  160-189 mg/dL   Very High:  >= 190 mg/dL    Non HDL Cholesterol  Desirable:  130 mg/dL  Above Desirable:  130-159 mg/dL  Borderline High:  160-189 mg/dL  High:  190-219 mg/dL  Very High:  Greater than or equal to 220 mg/dL   CBC with platelets and differential   Result Value Ref Range    WBC Count 5.4 4.0 - 11.0 10e3/uL    RBC Count 5.64 4.40 - 5.90 10e6/uL    Hemoglobin 16.5 13.3 - 17.7 g/dL    Hematocrit 46.6 40.0 - 53.0 %    MCV 83 78 - 100 fL    MCH 29.3 26.5 - 33.0 pg    MCHC 35.4 31.5 - 36.5 g/dL    RDW 12.5 10.0 - 15.0 %    Platelet Count 198 150 - 450 10e3/uL    % Neutrophils 53 %    % Lymphocytes 35 %    % Monocytes 8 %    % Eosinophils 3 %    % Basophils 1 %    %  Immature Granulocytes 0 %    NRBCs per 100 WBC 0 <1 /100    Absolute Neutrophils 2.8 1.6 - 8.3 10e3/uL    Absolute Lymphocytes 1.9 0.8 - 5.3 10e3/uL    Absolute Monocytes 0.4 0.0 - 1.3 10e3/uL    Absolute Eosinophils 0.2 0.0 - 0.7 10e3/uL    Absolute Basophils 0.0 0.0 - 0.2 10e3/uL    Absolute Immature Granulocytes 0.0 <=0.4 10e3/uL    Absolute NRBCs 0.0 10e3/uL       If you have any questions or concerns, please call the clinic at the number listed above.       Sincerely,      Pricilla Henley NP

## 2024-02-21 NOTE — LETTER
My Asthma Action Plan    Name: Casey Pedro   YOB: 1980  Date: 2/21/2024   My doctor: Pricilla Henley NP   [facility]        My Rescue Medicine:   Albuterol inhaler (Proair/Ventolin/Proventil HFA)  2-4 puffs EVERY 4 HOURS as needed. Use a spacer if recommended by your provider.   My Asthma Severity:     Know your asthma triggers:   None          GREEN ZONE   Good Control  I feel good  No cough or wheeze  Can work, sleep and play without asthma symptoms       Take your asthma control medicine every day.     If exercise triggers your asthma, take your rescue medication  15 minutes before exercise or sports, and  During exercise if you have asthma symptoms  Spacer to use with inhaler: If you have a spacer, make sure to use it with your inhaler             YELLOW ZONE Getting Worse  I have ANY of these:  I do not feel good  Cough or wheeze  Chest feels tight  Wake up at night   Keep taking your Green Zone medications  Start taking your rescue medicine:  every 20 minutes for up to 1 hour. Then every 4 hours for 24-48 hours.  If you stay in the Yellow Zone for more than 12-24 hours, contact your doctor.  If you do not return to the Green Zone in 12-24 hours or you get worse, start taking your oral steroid medicine if prescribed by your provider.           RED ZONE Medical Alert - Get Help  I have ANY of these:  I feel awful  Medicine is not helping  Breathing getting harder  Trouble walking or talking  Nose opens wide to breathe       Take your rescue medicine NOW  If your provider has prescribed an oral steroid medicine, start taking it NOW  Call your doctor NOW  If you are still in the Red Zone after 20 minutes and you have not reached your doctor:  Take your rescue medicine again and  Call 911 or go to the emergency room right away    See your regular doctor within 2 weeks of an Emergency Room or Urgent Care visit for follow-up treatment.          Annual Reminders:   Meet with Asthma Educator,  Flu Shot in the Fall, consider Pneumonia Vaccination for patients with asthma (aged 19 and older).    Pharmacy:    [facility] [id number] [place] [address]    Electronically signed by Pricilla Henley NP   Date: 02/21/24                    Asthma Triggers  How To Control Things That Make Your Asthma Worse    Triggers are things that make your asthma worse.  Look at the list below to help you find your triggers and   what you can do about them. You can help prevent asthma flare-ups by staying away from your triggers.      Trigger                                                          What you can do   Cigarette Smoke  Tobacco smoke can make asthma worse. Do not allow smoking in your home, car or around you.  Be sure no one smokes at a child s day care or school.  If you smoke, ask your health care provider for ways to help you quit.  Ask family members to quit too.  Ask your health care provider for a referral to Quit Plan to help you quit smoking, or call 3-663-771-PLAN.     Colds, Flu, Bronchitis  These are common triggers of asthma. Wash your hands often.  Don t touch your eyes, nose or mouth.  Get a flu shot every year.     Dust Mites  These are tiny bugs that live in cloth or carpet. They are too small to see. Wash sheets and blankets in hot water every week.   Encase pillows and mattress in dust mite proof covers.  Avoid having carpet if you can. If you have carpet, vacuum weekly.   Use a dust mask and HEPA vacuum.   Pollen and Outdoor Mold  Some people are allergic to trees, grass, or weed pollen, or molds. Try to keep your windows closed.  Limit time out doors when pollen count is high.   Ask you health care provider about taking medicine during allergy season.     Animal Dander  Some people are allergic to skin flakes, urine or saliva from pets with fur or feathers. Keep pets with fur or feathers out of your home.     If you can t keep the pet outdoors, then keep the pet out of your bedroom.  Keep the bedroom door closed.  Keep pets off cloth furniture and away from stuffed toys.     Mice, Rats, and Cockroaches  Some people are allergic to the waste from these pests.   Cover food and garbage.  Clean up spills and food crumbs.  Store grease in the refrigerator.   Keep food out of the bedroom.   Indoor Mold  This can be a trigger if your home has high moisture. Fix leaking faucets, pipes, or other sources of water.   Clean moldy surfaces.  Dehumidify basement if it is damp and smelly.   Smoke, Strong Odors, and Sprays  These can reduce air quality. Stay away from strong odors and sprays, such as perfume, powder, hair spray, paints, smoke incense, paint, cleaning products, candles and new carpet.   Exercise or Sports  Some people with asthma have this trigger. Be active!  Ask your doctor about taking medicine before sports or exercise to prevent symptoms.    Warm up for 5-10 minutes before and after sports or exercise.     Other Triggers of Asthma  Cold air:  Cover your nose and mouth with a scarf.  Sometimes laughing or crying can be a trigger.  Some medicines and food can trigger asthma.

## 2024-03-04 DIAGNOSIS — K21.9 GASTROESOPHAGEAL REFLUX DISEASE WITHOUT ESOPHAGITIS: ICD-10-CM

## 2024-03-04 RX ORDER — FAMOTIDINE 20 MG/1
20 TABLET, FILM COATED ORAL DAILY
Qty: 90 TABLET | Refills: 3 | Status: SHIPPED | OUTPATIENT
Start: 2024-03-04

## 2024-03-04 NOTE — TELEPHONE ENCOUNTER
famotidine (PEPCID) 20 MG tablet        Last Written Prescription Date:  8-31-23  Last Fill Quantity: 90,   # refills: 1  Last Office Visit: 2-21-24  Future Office visit:       Routing refill request to provider for review/approval because:

## 2024-03-22 NOTE — TELEPHONE ENCOUNTER
Patient is requesting Ativan instead of Buspar, not taking Buspar. Please advise    To: Pricilla Henley    Caretaker of patient is inquiring about the referral that was placed for this patient regarding his kidney.  Please return her call to discuss @ [phone]     Thank you

## 2024-03-25 ENCOUNTER — TELEPHONE (OUTPATIENT)
Dept: FAMILY MEDICINE | Facility: OTHER | Age: 44
End: 2024-03-25

## 2024-03-25 DIAGNOSIS — J30.1 ALLERGIC RHINITIS DUE TO POLLEN: ICD-10-CM

## 2024-03-25 RX ORDER — FLUTICASONE PROPIONATE 50 MCG
2 SPRAY, SUSPENSION (ML) NASAL DAILY
Qty: 16 G | Refills: 1 | Status: SHIPPED | OUTPATIENT
Start: 2024-03-25 | End: 2024-05-22

## 2024-03-25 NOTE — TELEPHONE ENCOUNTER
fluticasone propionate 50 mcg/actuation nasal spray,suspension       Last Written Prescription Date:  2/1/24  Last Fill Quantity: 16 g,   # refills: 1  Last Office Visit: 2/21/24  Future Office visit:

## 2024-03-25 NOTE — TELEPHONE ENCOUNTER
1:07 PM    Reason for Call: OVERBOOK    Patient is having the following symptoms: signs of aggressive behavior, last time this happened he needed his blood sugar checked.    The patient is requesting an appointment for as soon as possible with Pricilla Henley .    Was an appointment offered for this call? No  If yes : Appointment type              Date    Preferred method for responding to this message: Telephone Call    What is your phone number [phone]     If we cannot reach you directly, may we leave a detailed response at the number you provided? Yes    Can this message wait until your PCP/provider returns, if unavailable today? Not applicable, provider is in    Juliann Tilley
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
## 2024-03-25 NOTE — PROGRESS NOTES
Assessment & Plan     (R46.89) Aggression  (primary encounter diagnosis)  (F84.0) Autism  (F91.9) Disruptive behavior disorder  (K59.00) Constipation, unspecified constipation type  (Z13.0) Screening, anemia, deficiency, iron  (E08.65) Diabetes mellitus due to underlying condition with hyperglycemia, without long-term current use of insulin (H)  (N13.30) Bilateral hydronephrosis  (R21) Rash  Plan: clotrimazole (LOTRIMIN) 1 % external cream        Patient has Autism and unable to express how he is feeling. Therefore difficult to obtain accurate story. Group home staff noted one episode of aggression last week while he was using the restroom at work. Has not been aggressive since. In the past, group home staff note that something has been wrong when he has gotten aggressive. Usual cause was constipation.     Labs and AXR were then ordered. CBC normal. CMP and UA in process. Casey did not want to wait for the results. Also refused to complete AXR. [facility] staff plan to bring him back tomorrow for XR. Will notify patient of the results when available and intervene accordingly.     Overall, he looks well and is still eating and drinking well. VSS. Excited to go to [facility] after the appointment.     He does have bilateral hydroceles and scrotum is mildly erythematous. Possible cause of discomfort, but they have declined surgery in the past as it was felt the post-op course would be very difficult for him. Since scrotum is slightly red, will try a little clotrimazole cream to help with any irritation.     If blood work and XR look ok, will clear to return to work.     If aggression reoccurs, I would recommend he follow-up with urology as they may want to reconsider surgery.     Carly-group home staff member requested to be called with results. Her phone number is [phone].       Silva Peña is a 43 year old, presenting for the following health issues:  aggression     HPI     Concern - Aggression  Onset: last  "week, no episodes since then   Description: patient became upset at work, group home staff wondering if he is constipated or blood work is off as he has gotten aggressive in the past when he was constipated or glucose was high   Intensity: none  Progression of Symptoms:  intermittent  Accompanying Signs & Symptoms: aggression concerns, no fevers, no cough or cold like symptoms, no nausea or vomiting, no rashes  Previous history of similar problem: yes   Precipitating factors:        Worsened by: unknown  Alleviating factors:        Improved by: unknown   Therapies tried and outcome: None  He does have Autism and a disruptive behavior disorder. Unable to express how he is feeling.   Eating and drinking well.   Group home staff has not tracked bowel movements.   Blood glucoses have been normal.   HR and BP normal.   Weight stable. No loss or gain.   He does have a hydrocele-follows with urology. Plan is to monitor. If bothersome, they could operate, but with his Autism, it was felt he would have a rough post-op course.       Review of Systems  Constitutional, HEENT, cardiovascular, pulmonary, gi and gu systems are negative, except as otherwise noted.      Objective    /72 (BP Location: Left arm, Patient Position: Sitting, Cuff Size: Adult Regular)   Pulse 85   Temp 98.6  F (37  C) (Tympanic)   Ht 1.88 m (6' 2\")   Wt 96.5 kg (212 lb 12.8 oz)   SpO2 98%   BMI 27.32 kg/m    Body mass index is 27.32 kg/m .  Physical Exam   GENERAL: alert and no distress, often blurps out, but comments rarely make sense; unable to express how he is feeling  EYES: Eyes grossly normal to inspection, PERRL and conjunctivae and sclerae normal  HENT: ear canals and TM's normal, nose and mouth without ulcers or lesions  NECK: no adenopathy, no asymmetry, masses, or scars  RESP: lungs clear to auscultation - no rales, rhonchi or wheezes  CV: regular rate and rhythm, no murmur, click or rub, no peripheral edema  ABDOMEN: soft, " nontender, no hepatosplenomegaly, no masses and bowel sounds normal  Scrotum-bilateral hydroceles, right worse than left; skin on scrotum slightly erythematous  MS: no gross musculoskeletal defects noted, no edema  NEURO: Normal strength and tone, mentation intact and speech normal  PSYCH: mentation appears normal, affect normal/bright    Results for orders placed or performed in visit on 03/26/24 (from the past 24 hour(s))   UA with Microscopic reflex to Culture - [place]    Specimen: Urine, Midstream   Result Value Ref Range    Color Urine Yellow Colorless, Straw, Light Yellow, Yellow    Appearance Urine Clear Clear    Glucose Urine 200 (A) Negative mg/dL    Bilirubin Urine Negative Negative    Ketones Urine Negative Negative mg/dL    Specific Gravity Urine 1.025 1.003 - 1.035    Blood Urine Negative Negative    pH Urine 5.5 4.7 - 8.0    Protein Albumin Urine Negative Negative mg/dL    Urobilinogen Urine Normal Normal, 2.0 mg/dL    Nitrite Urine Negative Negative    Leukocyte Esterase Urine Negative Negative    Mucus Urine Present (A) None Seen /LPF    RBC Urine 0 <=2 /HPF    WBC Urine <1 <=5 /HPF    Squamous Epithelials Urine <1 <=1 /HPF    Hyaline Casts Urine 1 <=2 /LPF    Narrative    Urine Culture not indicated   CBC with platelets and differential    Narrative    The following orders were created for panel order CBC with platelets and differential.  Procedure                               Abnormality         Status                     ---------                               -----------         ------                     CBC with platelets and d...[[id number]]                      Final result                 Please view results for these tests on the individual orders.   CBC with platelets and differential   Result Value Ref Range    WBC Count 4.9 4.0 - 11.0 10e3/uL    RBC Count 5.77 4.40 - 5.90 10e6/uL    Hemoglobin 16.5 13.3 - 17.7 g/dL    Hematocrit 48.1 40.0 - 53.0 %    MCV 83 78 - 100 fL    MCH 28.6 26.5  - 33.0 pg    MCHC 34.3 31.5 - 36.5 g/dL    RDW 12.5 10.0 - 15.0 %    Platelet Count 180 150 - 450 10e3/uL    % Neutrophils 52 %    % Lymphocytes 37 %    % Monocytes 7 %    % Eosinophils 3 %    % Basophils 0 %    % Immature Granulocytes 0 %    NRBCs per 100 WBC 0 <1 /100    Absolute Neutrophils 2.6 1.6 - 8.3 10e3/uL    Absolute Lymphocytes 1.8 0.8 - 5.3 10e3/uL    Absolute Monocytes 0.3 0.0 - 1.3 10e3/uL    Absolute Eosinophils 0.2 0.0 - 0.7 10e3/uL    Absolute Basophils 0.0 0.0 - 0.2 10e3/uL    Absolute Immature Granulocytes 0.0 <=0.4 10e3/uL    Absolute NRBCs 0.0 10e3/uL           Signed Electronically by: Pricilla Henley, NP

## 2024-03-26 ENCOUNTER — OFFICE VISIT (OUTPATIENT)
Dept: FAMILY MEDICINE | Facility: OTHER | Age: 44
End: 2024-03-26
Attending: NURSE PRACTITIONER
Payer: MEDICARE

## 2024-03-26 VITALS
BODY MASS INDEX: 27.31 KG/M2 | OXYGEN SATURATION: 98 % | DIASTOLIC BLOOD PRESSURE: 72 MMHG | SYSTOLIC BLOOD PRESSURE: 112 MMHG | HEART RATE: 85 BPM | TEMPERATURE: 98.6 F | HEIGHT: 74 IN | WEIGHT: 212.8 LBS

## 2024-03-26 DIAGNOSIS — E08.65 DIABETES MELLITUS DUE TO UNDERLYING CONDITION WITH HYPERGLYCEMIA, WITHOUT LONG-TERM CURRENT USE OF INSULIN (H): ICD-10-CM

## 2024-03-26 DIAGNOSIS — R21 RASH: ICD-10-CM

## 2024-03-26 DIAGNOSIS — Z13.0 SCREENING, ANEMIA, DEFICIENCY, IRON: ICD-10-CM

## 2024-03-26 DIAGNOSIS — F91.9 DISRUPTIVE BEHAVIOR DISORDER: ICD-10-CM

## 2024-03-26 DIAGNOSIS — K59.00 CONSTIPATION, UNSPECIFIED CONSTIPATION TYPE: ICD-10-CM

## 2024-03-26 DIAGNOSIS — F84.0 AUTISM: ICD-10-CM

## 2024-03-26 DIAGNOSIS — N13.30 BILATERAL HYDRONEPHROSIS: ICD-10-CM

## 2024-03-26 DIAGNOSIS — R46.89 AGGRESSION: Primary | ICD-10-CM

## 2024-03-26 LAB
ALBUMIN SERPL BCG-MCNC: 4.5 G/DL (ref 3.5–5.2)
ALBUMIN UR-MCNC: NEGATIVE MG/DL
ALP SERPL-CCNC: 52 U/L (ref 40–150)
ALT SERPL W P-5'-P-CCNC: 70 U/L (ref 0–70)
ANION GAP SERPL CALCULATED.3IONS-SCNC: 11 MMOL/L (ref 7–15)
APPEARANCE UR: CLEAR
AST SERPL W P-5'-P-CCNC: 34 U/L (ref 0–45)
BASOPHILS # BLD AUTO: 0 10E3/UL (ref 0–0.2)
BASOPHILS NFR BLD AUTO: 0 %
BILIRUB SERPL-MCNC: 0.6 MG/DL
BILIRUB UR QL STRIP: NEGATIVE
BUN SERPL-MCNC: 11.2 MG/DL (ref 6–20)
CALCIUM SERPL-MCNC: 9.6 MG/DL (ref 8.6–10)
CHLORIDE SERPL-SCNC: 106 MMOL/L (ref 98–107)
COLOR UR AUTO: YELLOW
CREAT SERPL-MCNC: 0.78 MG/DL (ref 0.67–1.17)
DEPRECATED HCO3 PLAS-SCNC: 23 MMOL/L (ref 22–29)
EGFRCR SERPLBLD CKD-EPI 2021: >90 ML/MIN/1.73M2
EOSINOPHIL # BLD AUTO: 0.2 10E3/UL (ref 0–0.7)
EOSINOPHIL NFR BLD AUTO: 3 %
ERYTHROCYTE [DISTWIDTH] IN BLOOD BY AUTOMATED COUNT: 12.5 % (ref 10–15)
GLUCOSE SERPL-MCNC: 189 MG/DL (ref 70–99)
GLUCOSE UR STRIP-MCNC: 200 MG/DL
HCT VFR BLD AUTO: 48.1 % (ref 40–53)
HGB BLD-MCNC: 16.5 G/DL (ref 13.3–17.7)
HGB UR QL STRIP: NEGATIVE
HYALINE CASTS: 1 /LPF
IMM GRANULOCYTES # BLD: 0 10E3/UL
IMM GRANULOCYTES NFR BLD: 0 %
KETONES UR STRIP-MCNC: NEGATIVE MG/DL
LEUKOCYTE ESTERASE UR QL STRIP: NEGATIVE
LYMPHOCYTES # BLD AUTO: 1.8 10E3/UL (ref 0.8–5.3)
LYMPHOCYTES NFR BLD AUTO: 37 %
MCH RBC QN AUTO: 28.6 PG (ref 26.5–33)
MCHC RBC AUTO-ENTMCNC: 34.3 G/DL (ref 31.5–36.5)
MCV RBC AUTO: 83 FL (ref 78–100)
MONOCYTES # BLD AUTO: 0.3 10E3/UL (ref 0–1.3)
MONOCYTES NFR BLD AUTO: 7 %
MUCOUS THREADS #/AREA URNS LPF: PRESENT /LPF
NEUTROPHILS # BLD AUTO: 2.6 10E3/UL (ref 1.6–8.3)
NEUTROPHILS NFR BLD AUTO: 52 %
NITRATE UR QL: NEGATIVE
NRBC # BLD AUTO: 0 10E3/UL
NRBC BLD AUTO-RTO: 0 /100
PH UR STRIP: 5.5 [PH] (ref 4.7–8)
PLATELET # BLD AUTO: 180 10E3/UL (ref 150–450)
POTASSIUM SERPL-SCNC: 4.3 MMOL/L (ref 3.4–5.3)
PROT SERPL-MCNC: 7.1 G/DL (ref 6.4–8.3)
RBC # BLD AUTO: 5.77 10E6/UL (ref 4.4–5.9)
RBC URINE: 0 /HPF
SODIUM SERPL-SCNC: 140 MMOL/L (ref 135–145)
SP GR UR STRIP: 1.02 (ref 1–1.03)
SQUAMOUS EPITHELIAL: <1 /HPF
TSH SERPL DL<=0.005 MIU/L-ACNC: 1.73 UIU/ML (ref 0.3–4.2)
UROBILINOGEN UR STRIP-MCNC: NORMAL MG/DL
WBC # BLD AUTO: 4.9 10E3/UL (ref 4–11)
WBC URINE: <1 /HPF

## 2024-03-26 PROCEDURE — 80053 COMPREHEN METABOLIC PANEL: CPT | Mod: ZL | Performed by: NURSE PRACTITIONER

## 2024-03-26 PROCEDURE — 81001 URINALYSIS AUTO W/SCOPE: CPT | Mod: ZL | Performed by: NURSE PRACTITIONER

## 2024-03-26 PROCEDURE — 85025 COMPLETE CBC W/AUTO DIFF WBC: CPT | Mod: ZL | Performed by: NURSE PRACTITIONER

## 2024-03-26 PROCEDURE — 84443 ASSAY THYROID STIM HORMONE: CPT | Mod: ZL | Performed by: NURSE PRACTITIONER

## 2024-03-26 PROCEDURE — G0463 HOSPITAL OUTPT CLINIC VISIT: HCPCS

## 2024-03-26 PROCEDURE — 36415 COLL VENOUS BLD VENIPUNCTURE: CPT | Mod: ZL | Performed by: NURSE PRACTITIONER

## 2024-03-26 PROCEDURE — 99214 OFFICE O/P EST MOD 30 MIN: CPT | Performed by: NURSE PRACTITIONER

## 2024-03-26 RX ORDER — CLOTRIMAZOLE 1 %
CREAM (GRAM) TOPICAL 2 TIMES DAILY
Qty: 30 G | Refills: 0 | Status: SHIPPED | OUTPATIENT
Start: 2024-03-26 | End: 2024-04-02

## 2024-03-26 ASSESSMENT — PAIN SCALES - GENERAL: PAINLEVEL: NO PAIN (0)

## 2024-04-02 DIAGNOSIS — R21 RASH: ICD-10-CM

## 2024-04-02 RX ORDER — CLOTRIMAZOLE 1 %
CREAM (GRAM) TOPICAL 2 TIMES DAILY
Qty: 30 G | Refills: 0 | Status: SHIPPED | OUTPATIENT
Start: 2024-04-02 | End: 2024-04-30

## 2024-04-02 NOTE — TELEPHONE ENCOUNTER
Can the patient get a refill?  Do you think he is out, last fill on 3/26/24    clotrimazole (LOTRIMIN) 1 % external cream30 g03/26/2024--NoSig - Route: Apply topically 2 times daily - TopicalSent to pharmacy as: Clotrimazole 1 % External Cream (LOTRIMIN)Class: E-PrescribeOrder: [id number] Status: Receipt confirmed by pharmacy (3/26/2024  8:21 AM CDT)

## 2024-04-02 NOTE — TELEPHONE ENCOUNTER
Reason for call:  Medication      Have you contacted your pharmacy? Yes   If patient has contacted Pharmacy and it has been over 72hrs, continue to #2  Medication clotrimazole (LOTRIMIN) 1 % external cream   [facility]       (Please note that the turn-around-time for prescriptions is 72 business hours; I am sending your request at this time. SEND TO  [facility]  )

## 2024-04-02 NOTE — TELEPHONE ENCOUNTER
Duplicate refill request, Rx was filled 03/26/24.    Lotrimin      Last Written Prescription Date:  03/26/24  Last Fill Quantity: 30g,   # refills: 0  Last Office Visit: 03/26/24  Future Office visit:

## 2024-04-04 ENCOUNTER — ANCILLARY PROCEDURE (OUTPATIENT)
Dept: GENERAL RADIOLOGY | Facility: OTHER | Age: 44
End: 2024-04-04
Attending: NURSE PRACTITIONER
Payer: MEDICARE

## 2024-04-04 DIAGNOSIS — R46.89 AGGRESSION: ICD-10-CM

## 2024-04-04 DIAGNOSIS — K59.00 CONSTIPATION, UNSPECIFIED CONSTIPATION TYPE: ICD-10-CM

## 2024-04-04 PROCEDURE — 74018 RADEX ABDOMEN 1 VIEW: CPT | Mod: TC

## 2024-04-15 ENCOUNTER — TELEPHONE (OUTPATIENT)
Dept: FAMILY MEDICINE | Facility: OTHER | Age: 44
End: 2024-04-15

## 2024-04-15 DIAGNOSIS — R21 RASH: Primary | ICD-10-CM

## 2024-04-15 NOTE — TELEPHONE ENCOUNTER
12:07 PM    Reason for Call: Phone Call    Description: Velia, staff at pt group home called stating pt is out of clotrimazole (LOTRIMIN) 1 % external cream. This medication does not have any refills. The medication is not working for pt. Staff was calling to discuss options for pt.     Was an appointment offered for this call? No  If yes : Appointment type              Date    Preferred method for responding to this message: Telephone Call  What is your phone number ?    If we cannot reach you directly, may we leave a detailed response at the number you provided? Yes    Can this message wait until your PCP/provider returns, if available today? No    Keysha Rosales

## 2024-04-15 NOTE — TELEPHONE ENCOUNTER
Called Velia at group home.  She has left for the day.  Will call her tomorrow.      Do you know of an alternative for the Lotrimin    lotrimazole (LOTRIMIN) 1 % external cream30 g04/2/2024--NoSig - Route: Apply topically 2 times daily - TopicalSent to pharmacy as: Clotrimazole 1 % External Cream (LOTRIMIN)Class: E-PrescribeOrder: [id number] Status: Receipt confirmed by pharmacy (4/2/2024  2:31 PM CDT)

## 2024-04-16 RX ORDER — NYSTATIN 100000 [USP'U]/G
POWDER TOPICAL 4 TIMES DAILY PRN
Qty: 60 G | Refills: 0 | Status: SHIPPED | OUTPATIENT
Start: 2024-04-16 | End: 2024-04-30

## 2024-04-16 RX ORDER — NYSTATIN 100000 [USP'U]/G
POWDER TOPICAL ONCE
Status: CANCELLED | OUTPATIENT
Start: 2024-04-16 | End: 2024-04-16

## 2024-04-30 ENCOUNTER — TELEPHONE (OUTPATIENT)
Dept: FAMILY MEDICINE | Facility: OTHER | Age: 44
End: 2024-04-30

## 2024-04-30 ENCOUNTER — OFFICE VISIT (OUTPATIENT)
Dept: FAMILY MEDICINE | Facility: OTHER | Age: 44
End: 2024-04-30
Attending: NURSE PRACTITIONER
Payer: MEDICARE

## 2024-04-30 VITALS
DIASTOLIC BLOOD PRESSURE: 80 MMHG | TEMPERATURE: 97.5 F | OXYGEN SATURATION: 98 % | WEIGHT: 211.4 LBS | RESPIRATION RATE: 16 BRPM | BODY MASS INDEX: 27.13 KG/M2 | SYSTOLIC BLOOD PRESSURE: 106 MMHG | HEART RATE: 86 BPM | HEIGHT: 74 IN

## 2024-04-30 DIAGNOSIS — F84.0 AUTISM: ICD-10-CM

## 2024-04-30 DIAGNOSIS — R56.9 CONVULSIONS, UNSPECIFIED CONVULSION TYPE (H): ICD-10-CM

## 2024-04-30 DIAGNOSIS — J45.20 MILD INTERMITTENT ASTHMA WITHOUT COMPLICATION: ICD-10-CM

## 2024-04-30 DIAGNOSIS — D69.6 THROMBOCYTOPENIA (H): ICD-10-CM

## 2024-04-30 DIAGNOSIS — N13.30 BILATERAL HYDRONEPHROSIS: ICD-10-CM

## 2024-04-30 DIAGNOSIS — F70 MILD INTELLECTUAL DISABILITY: ICD-10-CM

## 2024-04-30 DIAGNOSIS — I10 BENIGN ESSENTIAL HYPERTENSION: ICD-10-CM

## 2024-04-30 DIAGNOSIS — E08.65 DIABETES MELLITUS DUE TO UNDERLYING CONDITION WITH HYPERGLYCEMIA, WITHOUT LONG-TERM CURRENT USE OF INSULIN (H): ICD-10-CM

## 2024-04-30 DIAGNOSIS — Z01.818 PREOP GENERAL PHYSICAL EXAM: Primary | ICD-10-CM

## 2024-04-30 DIAGNOSIS — E78.5 HYPERLIPIDEMIA, UNSPECIFIED HYPERLIPIDEMIA TYPE: ICD-10-CM

## 2024-04-30 DIAGNOSIS — F91.9 DISRUPTIVE BEHAVIOR DISORDER: ICD-10-CM

## 2024-04-30 LAB
ANION GAP SERPL CALCULATED.3IONS-SCNC: 12 MMOL/L (ref 7–15)
BASOPHILS # BLD AUTO: 0 10E3/UL (ref 0–0.2)
BASOPHILS NFR BLD AUTO: 1 %
BUN SERPL-MCNC: 10.9 MG/DL (ref 6–20)
CALCIUM SERPL-MCNC: 9.5 MG/DL (ref 8.6–10)
CHLORIDE SERPL-SCNC: 103 MMOL/L (ref 98–107)
CREAT SERPL-MCNC: 0.75 MG/DL (ref 0.67–1.17)
DEPRECATED HCO3 PLAS-SCNC: 25 MMOL/L (ref 22–29)
EGFRCR SERPLBLD CKD-EPI 2021: >90 ML/MIN/1.73M2
EOSINOPHIL # BLD AUTO: 0.2 10E3/UL (ref 0–0.7)
EOSINOPHIL NFR BLD AUTO: 4 %
ERYTHROCYTE [DISTWIDTH] IN BLOOD BY AUTOMATED COUNT: 12.6 % (ref 10–15)
GLUCOSE SERPL-MCNC: 125 MG/DL (ref 70–99)
HCT VFR BLD AUTO: 45.9 % (ref 40–53)
HGB BLD-MCNC: 16.1 G/DL (ref 13.3–17.7)
IMM GRANULOCYTES # BLD: 0 10E3/UL
IMM GRANULOCYTES NFR BLD: 0 %
LYMPHOCYTES # BLD AUTO: 2.3 10E3/UL (ref 0.8–5.3)
LYMPHOCYTES NFR BLD AUTO: 40 %
MCH RBC QN AUTO: 29.2 PG (ref 26.5–33)
MCHC RBC AUTO-ENTMCNC: 35.1 G/DL (ref 31.5–36.5)
MCV RBC AUTO: 83 FL (ref 78–100)
MONOCYTES # BLD AUTO: 0.5 10E3/UL (ref 0–1.3)
MONOCYTES NFR BLD AUTO: 8 %
NEUTROPHILS # BLD AUTO: 2.7 10E3/UL (ref 1.6–8.3)
NEUTROPHILS NFR BLD AUTO: 47 %
NRBC # BLD AUTO: 0 10E3/UL
NRBC BLD AUTO-RTO: 0 /100
PLATELET # BLD AUTO: 208 10E3/UL (ref 150–450)
POTASSIUM SERPL-SCNC: 4.3 MMOL/L (ref 3.4–5.3)
RBC # BLD AUTO: 5.52 10E6/UL (ref 4.4–5.9)
SODIUM SERPL-SCNC: 140 MMOL/L (ref 135–145)
WBC # BLD AUTO: 5.7 10E3/UL (ref 4–11)

## 2024-04-30 PROCEDURE — 80048 BASIC METABOLIC PNL TOTAL CA: CPT | Mod: ZL | Performed by: NURSE PRACTITIONER

## 2024-04-30 PROCEDURE — 93005 ELECTROCARDIOGRAM TRACING: CPT | Performed by: NURSE PRACTITIONER

## 2024-04-30 PROCEDURE — 99214 OFFICE O/P EST MOD 30 MIN: CPT | Performed by: NURSE PRACTITIONER

## 2024-04-30 PROCEDURE — G0463 HOSPITAL OUTPT CLINIC VISIT: HCPCS

## 2024-04-30 PROCEDURE — 93010 ELECTROCARDIOGRAM REPORT: CPT | Mod: 77 | Performed by: INTERNAL MEDICINE

## 2024-04-30 PROCEDURE — 85025 COMPLETE CBC W/AUTO DIFF WBC: CPT | Mod: ZL | Performed by: NURSE PRACTITIONER

## 2024-04-30 PROCEDURE — 36415 COLL VENOUS BLD VENIPUNCTURE: CPT | Mod: ZL | Performed by: NURSE PRACTITIONER

## 2024-04-30 ASSESSMENT — PAIN SCALES - GENERAL: PAINLEVEL: NO PAIN (0)

## 2024-04-30 NOTE — PROGRESS NOTES
Preoperative Evaluation  [facility] [place]  [address]  [place] [address]  Phone: [phone]  Primary Provider: Belinda Avial  Pre-op Performing Provider: BELINDA AVILA  Apr 30, 2024   56}    Casey is a 43 year old, presenting for the following:  Pre-Op Exam (Hydrocelectomy)      Surgical Information  Surgery/Procedure: Repair of bilateral hydroceles  Surgery Location: [facility]  Surgeon: Dr. Al  Surgery Date: 05/08/2024  Time of Surgery: TBD  Where patient plans to recover: At home with Home Care    Assessment & Plan     The proposed surgical procedure is considered INTERMEDIATE risk.    (Z01.818) Preop general physical exam  (primary encounter diagnosis)  (N13.30) Bilateral hydronephrosis  Plan: Blood work unremarkable. EKG unremarkable. Cleared for surgery.     (E08.65) Diabetes mellitus due to underlying condition with hyperglycemia, without long-term current use of insulin (H)  Comment: controlled  Plan: recent A1C 5.6.     (I10) Benign essential hypertension  Plan: Controlled.     (E78.5) Hyperlipidemia, unspecified hyperlipidemia type  Plan: Tolerating statin.     (J45.20) Mild intermittent asthma without complication  Plan: Controlled.     (D69.6) Thrombocytopenia (H24)  Plan: platelets normal today.     (F70) Mental retardation  (F91.9) Disruptive behavior disorder  (F84.0) Autism  Plan: FYI to surgical team. Ok to take Ativan the morning of surgery.     (R56.9) Convulsions, unspecified convulsion type (H)  Plan: No seizure in years. Will continue the Depakote the morning of surgery.            Risks and Recommendations  The patient has the following additional risks and recommendations for perioperative complications:  Pulmonary:    - Incentive spirometry post-op    Will hold all medications the morning of surgery except his Depakote and Ativan.     Will avoid NSAID use 10 days prior.     Recommendation  APPROVAL GIVEN to proceed with proposed procedure, without  further diagnostic evaluation.    Casey Meredithhelmi with a functional capacity of greater than four MET's. There are no obvious contraindications to proceeding with surgery at this time. Recommend that the surgeon review risks and benefits of the procedure prior to proceeding.     Was recommended to avoid eating and drinking anything 12 hours prior to surgery unless his surgeon tells him otherwise.       Subjective       HPI related to upcoming procedure: Patient with bilateral hydroceles. Plans to have surgery to repair.           4/30/2024     3:18 PM   Preop Questions   1. Have you ever had a heart attack or stroke? No   2. Have you ever had surgery on your heart or blood vessels, such as a stent placement, a coronary artery bypass, or surgery on an artery in your head, neck, heart, or legs? No   3. Do you have chest pain with activity? No   4. Do you have a history of  heart failure? No   5. Do you currently have a cold, bronchitis or symptoms of other infection? No   6. Do you have a cough, shortness of breath, or wheezing? No   7. Do you or anyone in your family have previous history of blood clots? No   8. Do you or does anyone in your family have a serious bleeding problem such as prolonged bleeding following surgeries or cuts? No   9. Have you ever had problems with anemia or been told to take iron pills? No   10. Have you had any abnormal blood loss such as black, tarry or bloody stools? No   11. Have you ever had a blood transfusion? No   12. Are you willing to have a blood transfusion if it is medically needed before, during, or after your surgery? Yes   13. Have you or any of your relatives ever had problems with anesthesia? No   14. Do you have sleep apnea, excessive snoring or daytime drowsiness? No   15. Do you have any artifical heart valves or other implanted medical devices like a pacemaker, defibrillator, or continuous glucose monitor? No   16. Do you have artificial joints? No   17. Are you  allergic to latex? No     Health Care Directive  Patient does not have a Health Care Directive or Living Will: Discussed advance care planning with patient; however, patient declined at this time.    Preoperative Review of    reviewed - controlled substances reflected in medication list.      Status of Chronic Conditions:  ASTHMA - Patient has a longstanding history of moderate-severe Asthma . Patient has been doing well overall noting NO SYMPTOMS and continues on medication regimen consisting of PRN albuterol without adverse reactions or side effects.     DIABETES - Patient has a longstanding history of Diabetes Type II . Patient is being treated with oral agents, metformin 500 mg XR once daily, and denies significant side effects. Control has been good. Complicating factors include but are not limited to: hypertension and hyperlipidemia. A1C was 5.6 on 2/21/24.     HYPERLIPIDEMIA - Patient has a long history of significant Hyperlipidemia requiring medication for treatment with recent good control. Patient reports no problems or side effects with the medication. Taking Lipitor 40 mg without side effects.     HYPERTENSION - Patient has longstanding history of HTN , currently denies any symptoms referable to elevated blood pressure. Specifically denies chest pain, palpitations, dyspnea, orthopnea, PND or peripheral edema. Blood pressure readings have been in normal range. Current medication regimen is as listed below. Patient denies any side effects of medication. Taking lisinopril 5 mg.     Autism; some behaviors. Controlled with Risperdal, Zyprexa, Depakote, and Ativan.     H/O seizures, has not had one in years.     Mets greater than 4, able to walk a block without stopping.     GERD; controlled with Pepcid.     Patient Active Problem List    Diagnosis Date Noted    Hyperlipidemia, unspecified hyperlipidemia type 08/10/2022     Priority: Medium    Gastroesophageal reflux disease without esophagitis  08/10/2022     Priority: Medium    Benign essential hypertension 08/10/2022     Priority: Medium    Type 2 diabetes mellitus without complication, without long-term current use of insulin (H) 04/24/2019     Priority: Medium    Mild intermittent asthma without complication 02/20/2018     Priority: Medium    Slow transit constipation 02/20/2018     Priority: Medium    Seasonal allergic rhinitis 09/27/2016     Priority: Medium    Disruptive behavior disorder 08/11/2016     Priority: Medium    Annual NHS Examination 01/06/2015     Priority: Medium    BPH (begnign prostatic hyperplasia) 09/16/2011     Priority: Medium    Mental retardation 09/16/2011     Priority: Medium    Autism 09/16/2011     Priority: Medium    Convulsions (H) 06/21/2007     Priority: Medium     Formatting of this note might be different from the original.   IMO Update 10/11      Thrombocytopenia (H24) 06/21/2007     Priority: Medium     Formatting of this note might be different from the original.   IMO Update 10/11        Past Medical History:   Diagnosis Date    Autism 09/16/2011    BPH 09/16/2011    Diabetes mellitus, type 2 (H)     Mental retardation 09/16/2011     No past surgical history on file.  Current Outpatient Medications   Medication Sig Dispense Refill    acetaminophen (TYLENOL) 325 MG tablet Take 500 mg by mouth every 6 hours as needed for mild pain 1-2 tabs every 4-6 hours as needed for pain or fever      Alcohol Swabs (B-D SINGLE USE SWABS REGULAR) PADS 1 Application every 4 hours as needed 200 each 3    ASPIRIN LOW DOSE 81 MG EC tablet Take 1 tablet (81 mg) by mouth daily 120 tablet 0    atorvastatin (LIPITOR) 40 MG tablet Take 1 tablet (40 mg) by mouth daily 90 tablet 3    Black Elderberry (SAMBUCOL BLACK ELDERBERRY) 1.9 GM/5ML SYRP Take 10 mLs by mouth      blood glucose (CONTOUR NEXT TEST) test strip Use to test blood sugar 2 times daily or as directed. 50 strip 11    blood glucose (NO BRAND SPECIFIED) test strip Use to test  blood sugar 2 times daily or as directed.      blood glucose monitoring (NO BRAND SPECIFIED) meter device kit Use to test blood sugar 2 times daily or as directed. (Patient taking differently: daily Use to test blood sugar 2 times daily or as directed.) 1 kit 0    calcium carbonate (TUMS) 500 MG chewable tablet 500 mg      cetirizine (ZYRTEC) 10 MG tablet TAKE ONE (1) TABLET BY MOUTH DAILY 30 tablet 8    divalproex sodium delayed-release (DEPAKOTE) 500 MG DR tablet Take 750 mg twice daily      famotidine (PEPCID) 20 MG tablet Take 1 tablet (20 mg) by mouth daily 90 tablet 3    fish oil-omega-3 fatty acids 1000 MG capsule TAKE TWO (2) CAPSULES BY MOUTH DAILY 100 capsule 7    fluticasone (FLONASE) 50 MCG/ACT nasal spray Spray 2 sprays into both nostrils daily 16 g 1    guanFACINE (TENEX) 1 MG tablet Take 1 tablet by mouth twice a day Take one tablet by mouth every day at 8 am and one tablet by mouth every day at 5 pm 60 tablet 0    ibuprofen (ADVIL/MOTRIN) 800 MG tablet Take 800 mg by mouth every 8 hours as needed for moderate pain      lisinopril (ZESTRIL) 5 MG tablet Take 1 tablet (5 mg) by mouth daily 90 tablet 3    loperamide (IMODIUM) 2 MG capsule Take 2 mg by mouth      LORazepam (ATIVAN) 1 MG tablet Take 1 tablet (1 mg) by mouth daily (with breakfast) AND 2 tablets (2 mg) daily. Take 1 mg at 7:00 AM and take 2 mg at 3:00 PM. 90 tablet 0    Magnesium Hydroxide (MILK OF MAGNESIA PO) 30 ml (2 tablespoons) once daily as needed      metFORMIN (GLUCOPHAGE XR) 500 MG 24 hr tablet Take 1 tablet (500 mg) by mouth daily (with dinner) 90 tablet 3    Microlet Lancets MISC Use to test blood sugar 2 times daily or as directed. 100 each 3    OLANZapine (ZYPREXA) 20 MG tablet Take 20 mg by mouth daily Take with the 10mg.      OLANZapine (ZYPREXA) 5 MG tablet Take 5 mg by mouth daily (with lunch)      OLANZapine zydis (ZYPREXA) 10 MG ODT 10 mg every 4 hours as needed Do not exceed twice daily      polyethylene glycol (GAVILAX)  17 GM/Dose powder Take 17 g (1 capful) by mouth daily 510 g 10    pseudoePHEDrine (SUDAFED) 30 MG tablet Take 30 mg by mouth every 4 hours as needed for congestion Take 2 tabs every 4-6 hours prn for congestion      risperiDONE (RISPERDAL) 0.25 MG tablet Take 0.25 mg by mouth 2 times daily  4    risperiDONE (RISPERDAL) 0.5 MG tablet TAKE ONE TABLET BY MOUTH TWICE DAILY along with the 0.25 mg      senna (SENOKOT) 8.6 MG tablet Take 8.6 mg by mouth      senna-docusate (SENOKOT-S/PERICOLACE) 8.6-50 MG tablet TAKE ONE TABLET BY MOUTH TWICE DAILY 100 tablet 11    UNABLE TO FIND MEDICATION NAME: prune juice, give 8 oz of prune juice daily      UNABLE TO FIND MEDICATION NAME: fleet enema, 1 enema prn for constipation, rectally prn for severe constipation      VITAMIN D3 50 MCG (2000 UT) tablet TAKE ONE TABLET BY MOUTH EVERY DAY (2000UNITS) 100 tablet 2    clotrimazole (LOTRIMIN) 1 % external cream Apply topically 2 times daily (Patient not taking: Reported on 4/30/2024) 30 g 0    Lidocaine (LIDOCARE) 4 % Patch Place 1 patch onto the skin every 24 hours To prevent lidocaine toxicity, patient should be patch free for 12 hrs daily. (Patient not taking: Reported on 4/30/2024) 15 patch 0    nystatin (MYCOSTATIN) 847658 UNIT/GM external powder Apply topically 4 times daily as needed for other (rash) (Patient not taking: Reported on 4/30/2024) 60 g 0       No Known Allergies     Social History     Tobacco Use    Smoking status: Never    Smokeless tobacco: Never    Tobacco comments:     no passive exposure   Substance Use Topics    Alcohol use: No     Family History   Problem Relation Age of Onset    Alcohol/Drug Father         alcoholism     History   Drug Use No         Review of Systems    Review of Systems  CONSTITUTIONAL: NEGATIVE for fever, chills, change in weight  INTEGUMENTARY/SKIN: NEGATIVE for worrisome rashes, moles or lesions  EYES: NEGATIVE for vision changes or irritation  ENT/MOUTH: NEGATIVE for ear, mouth and  "throat problems  RESP: NEGATIVE for significant cough or SOB  CV: NEGATIVE for chest pain, palpitations or peripheral edema  GI: NEGATIVE for nausea, abdominal pain, heartburn, or change in bowel habits  : NEGATIVE for frequency, dysuria, or hematuria  MUSCULOSKELETAL: NEGATIVE for significant arthralgias or myalgia  NEURO: NEGATIVE for weakness, dizziness or paresthesias  ENDOCRINE: NEGATIVE for temperature intolerance, skin/hair changes  HEME: NEGATIVE for bleeding problems  PSYCHIATRIC: NEGATIVE for changes in mood or affect    Objective    /80   Pulse 86   Temp 97.5  F (36.4  C) (Tympanic)   Resp 16   Ht 1.88 m (6' 2\")   Wt 95.9 kg (211 lb 6.4 oz)   SpO2 98%   BMI 27.14 kg/m     Estimated body mass index is 27.14 kg/m  as calculated from the following:    Height as of this encounter: 1.88 m (6' 2\").    Weight as of this encounter: 95.9 kg (211 lb 6.4 oz).  Physical Exam  GENERAL: alert and no distress, often blurps out statements  EYES: Eyes grossly normal to inspection, PERRL and conjunctivae and sclerae normal  HENT: ear canals and TM's normal, nose and mouth without ulcers or lesions  NECK: no adenopathy, no asymmetry, masses, or scars  RESP: lungs clear to auscultation - no rales, rhonchi or wheezes  CV: regular rate and rhythm, no murmur, click or rub, no peripheral edema  ABDOMEN: soft, nontender, no hepatosplenomegaly, no masses and bowel sounds normal  MS: no gross musculoskeletal defects noted, no edema  SKIN: no suspicious lesions or rashes  NEURO: Normal strength and tone, mentation intact and speech normal  PSYCH: mentation appears normal, affect normal/bright    Recent Labs   Lab Test 03/26/24  0812 02/21/24  0807 08/21/23  0828   HGB 16.5 16.5  --     198  --     139 142   POTASSIUM 4.3 4.3 4.1   CR 0.78 0.72 0.64*   A1C  --  5.6 5.7*        Diagnostics  Recent Results (from the past 24 hour(s))   EKG 12-lead complete w/read - (Clinic Performed)    Collection Time: " 04/30/24  3:55 PM   Result Value Ref Range    Systolic Blood Pressure  mmHg    Diastolic Blood Pressure  mmHg    Ventricular Rate 87 BPM    Atrial Rate 87 BPM    WY Interval 126 ms    QRS Duration 88 ms     ms    QTc 440 ms    P Axis 33 degrees    R AXIS -3 degrees    T Axis 44 degrees    Interpretation ECG       Sinus rhythm  Normal ECG  No previous ECGs available     Basic metabolic panel    Collection Time: 04/30/24  4:05 PM   Result Value Ref Range    Sodium 140 135 - 145 mmol/L    Potassium 4.3 3.4 - 5.3 mmol/L    Chloride 103 98 - 107 mmol/L    Carbon Dioxide (CO2) 25 22 - 29 mmol/L    Anion Gap 12 7 - 15 mmol/L    Urea Nitrogen 10.9 6.0 - 20.0 mg/dL    Creatinine 0.75 0.67 - 1.17 mg/dL    GFR Estimate >90 >60 mL/min/1.73m2    Calcium 9.5 8.6 - 10.0 mg/dL    Glucose 125 (H) 70 - 99 mg/dL   CBC with platelets and differential    Collection Time: 04/30/24  4:05 PM   Result Value Ref Range    WBC Count 5.7 4.0 - 11.0 10e3/uL    RBC Count 5.52 4.40 - 5.90 10e6/uL    Hemoglobin 16.1 13.3 - 17.7 g/dL    Hematocrit 45.9 40.0 - 53.0 %    MCV 83 78 - 100 fL    MCH 29.2 26.5 - 33.0 pg    MCHC 35.1 31.5 - 36.5 g/dL    RDW 12.6 10.0 - 15.0 %    Platelet Count 208 150 - 450 10e3/uL    % Neutrophils 47 %    % Lymphocytes 40 %    % Monocytes 8 %    % Eosinophils 4 %    % Basophils 1 %    % Immature Granulocytes 0 %    NRBCs per 100 WBC 0 <1 /100    Absolute Neutrophils 2.7 1.6 - 8.3 10e3/uL    Absolute Lymphocytes 2.3 0.8 - 5.3 10e3/uL    Absolute Monocytes 0.5 0.0 - 1.3 10e3/uL    Absolute Eosinophils 0.2 0.0 - 0.7 10e3/uL    Absolute Basophils 0.0 0.0 - 0.2 10e3/uL    Absolute Immature Granulocytes 0.0 <=0.4 10e3/uL    Absolute NRBCs 0.0 10e3/uL        EKG: appears normal, NSR, normal axis, normal intervals, no acute ST/T changes c/w ischemia, no LVH by voltage criteria, there are no prior tracings available    Revised Cardiac Risk Index (RCRI)  The patient has the following serious cardiovascular risks for  perioperative complications:   - No serious cardiac risks = 0 points     RCRI Interpretation: 0 points: Class I (very low risk - 0.4% complication rate)         Signed Electronically by: Pricilla Henley NP  Copy of this evaluation report is provided to requesting physician.

## 2024-04-30 NOTE — PATIENT INSTRUCTIONS
No ibuprofen or aspirin 10 days prior. Ok to use Tylenol.       Preparing for Your Surgery  Getting started  A nurse will call you to review your health history and instructions. They will give you an arrival time based on your scheduled surgery time. Please be ready to share:  Your doctor's clinic name and phone number  Your medical, surgical, and anesthesia history  A list of allergies and sensitivities  A list of medicines, including herbal treatments and over-the-counter drugs  Whether the patient has a legal guardian (ask how to send us the papers in advance)  Please tell us if you're pregnant--or if there's any chance you might be pregnant. Some surgeries may injure a fetus (unborn baby), so they require a pregnancy test. Surgeries that are safe for a fetus don't always need a test, and you can choose whether to have one.   If you have a child who's having surgery, please ask for a copy of Preparing for Your Child's Surgery.    Preparing for surgery  Within 10 to 30 days of surgery: Have a pre-op exam (sometimes called an H&P, or History and Physical). This can be done at a clinic or pre-operative center.  If you're having a , you may not need this exam. Talk to your care team.  At your pre-op exam, talk to your care team about all medicines you take. If you need to stop any medicines before surgery, ask when to start taking them again.  We do this for your safety. Many medicines can make you bleed too much during surgery. Some change how well surgery (anesthesia) drugs work.  Call your insurance company to let them know you're having surgery. (If you don't have insurance, call [phone].)  Call your clinic if there's any change in your health. This includes signs of a cold or flu (sore throat, runny nose, cough, rash, fever). It also includes a scrape or scratch near the surgery site.  If you have questions on the day of surgery, call your hospital or surgery center.  Eating and drinking  guidelines  For your safety: Unless your surgeon tells you otherwise, follow the guidelines below.  Eat and drink as usual until 8 hours before you arrive for surgery. After that, no food or milk.  Drink clear liquids until 2 hours before you arrive. These are liquids you can see through, like water, Gatorade, and Propel Water. They also include plain black coffee and tea (no cream or milk), candy, and breath mints. You can spit out gum when you arrive.  If you drink alcohol: Stop drinking it the night before surgery.  If your care team tells you to take medicine on the morning of surgery, it's okay to take it with a sip of water.  Preventing infection  Shower or bathe the night before and morning of your surgery. Follow the instructions your clinic gave you. (If no instructions, use regular soap.)  Don't shave or clip hair near your surgery site. We'll remove the hair if needed.  Don't smoke or vape the morning of surgery. You may chew nicotine gum up to 2 hours before surgery. A nicotine patch is okay.  Note: Some surgeries require you to completely quit smoking and nicotine. Check with your surgeon.  Your care team will make every effort to keep you safe from infection. We will:  Clean our hands often with soap and water (or an alcohol-based hand rub).  Clean the skin at your surgery site with a special soap that kills germs.  Give you a special gown to keep you warm. (Cold raises the risk of infection.)  Wear special hair covers, masks, gowns and gloves during surgery.  Give antibiotic medicine, if prescribed. Not all surgeries need antibiotics.  What to bring on the day of surgery  Photo ID and insurance card  Copy of your health care directive, if you have one  Glasses and hearing aids (bring cases)  You can't wear contacts during surgery  Inhaler and eye drops, if you use them (tell us about these when you arrive)  CPAP machine or breathing device, if you use them  A few personal items, if spending the  night  If you have . . .  A pacemaker, ICD (cardiac defibrillator) or other implant: Bring the ID card.  An implanted stimulator: Bring the remote control.  A legal guardian: Bring a copy of the certified (court-stamped) guardianship papers.  Please remove any jewelry, including body piercings. Leave jewelry and other valuables at home.  If you're going home the day of surgery  You must have a responsible adult drive you home. They should stay with you overnight as well.  If you don't have someone to stay with you, and you aren't safe to go home alone, we may keep you overnight. Insurance often won't pay for this.  After surgery  If it's hard to control your pain or you need more pain medicine, please call your surgeon's office.  Questions?   If you have any questions for your care team, list them here: _________________________________________________________________________________________________________________________________________________________________________ ____________________________________ ____________________________________ ____________________________________  For informational purposes only. Not to replace the advice of your health care provider. Copyright   2003, 2019 [employer]. All rights reserved. Clinically reviewed by Jenny Aguirre MD. [employer] [id number] - REV 12/22.

## 2024-04-30 NOTE — TELEPHONE ENCOUNTER
11:50 AM    Reason for Call: OVERBOOK    Patient is having the following symptoms: Patient has scrotum surgery at [facility] in [place] on 05/08.      The patient is requesting an appointment for Pre-op needed with Lory.    Was an appointment offered for this call? No  If yes : Appointment type              Date    Preferred method for responding to this message: Telephone Call  What is your phone number ? [phone]    If we cannot reach you directly, may we leave a detailed response at the number you provided? Yes    Can this message wait until your PCP/provider returns, if unavailable today? Please call back to schedule Pre-op appt with Lory.    Mariah Harris

## 2024-04-30 NOTE — TELEPHONE ENCOUNTER
No SDS up until surgery day / was available slot today, so I scheduled him this afternoon. Staff said they will get a van and have him here, may be a couple min late

## 2024-05-01 ENCOUNTER — TELEPHONE (OUTPATIENT)
Dept: FAMILY MEDICINE | Facility: OTHER | Age: 44
End: 2024-05-01

## 2024-05-01 LAB
ATRIAL RATE - MUSE: 87 BPM
DIASTOLIC BLOOD PRESSURE - MUSE: NORMAL MMHG
INTERPRETATION ECG - MUSE: NORMAL
P AXIS - MUSE: 33 DEGREES
PR INTERVAL - MUSE: 126 MS
QRS DURATION - MUSE: 88 MS
QT - MUSE: 366 MS
QTC - MUSE: 440 MS
R AXIS - MUSE: -3 DEGREES
SYSTOLIC BLOOD PRESSURE - MUSE: NORMAL MMHG
T AXIS - MUSE: 44 DEGREES
VENTRICULAR RATE- MUSE: 87 BPM

## 2024-05-01 NOTE — TELEPHONE ENCOUNTER
Faxed pre-op notes, demographics, med list and EKG to [place] [phone] for 5/8 procedure with Dr. Al

## 2024-05-22 DIAGNOSIS — J30.1 ALLERGIC RHINITIS DUE TO POLLEN: ICD-10-CM

## 2024-05-22 RX ORDER — FLUTICASONE PROPIONATE 50 MCG
2 SPRAY, SUSPENSION (ML) NASAL DAILY
Qty: 16 G | Refills: 1 | Status: SHIPPED | OUTPATIENT
Start: 2024-05-22 | End: 2024-08-06

## 2024-05-22 NOTE — TELEPHONE ENCOUNTER
Flonase      Last Written Prescription Date:  3/25/24  Last Fill Quantity: 16g,   # refills: 1  Last Office Visit: 4/30/24  Future Office visit:       Routing refill request to provider for review/approval because:

## 2024-06-13 ENCOUNTER — TRANSFERRED RECORDS (OUTPATIENT)
Dept: MULTI SPECIALTY CLINIC | Facility: CLINIC | Age: 44
End: 2024-06-13

## 2024-06-13 LAB — RETINOPATHY: NORMAL

## 2024-06-20 DIAGNOSIS — N42.9 DISORDER OF PROSTATE: ICD-10-CM

## 2024-06-20 DIAGNOSIS — E11.9 TYPE 2 DIABETES MELLITUS WITHOUT COMPLICATION, WITHOUT LONG-TERM CURRENT USE OF INSULIN (H): ICD-10-CM

## 2024-06-20 RX ORDER — ASPIRIN 81 MG/1
81 TABLET, COATED ORAL DAILY
Qty: 120 TABLET | Refills: 1 | Status: SHIPPED | OUTPATIENT
Start: 2024-06-20

## 2024-06-20 NOTE — TELEPHONE ENCOUNTER
Disp Refills Start End EAN   Alcohol Swabs (B-D SINGLE USE SWABS REGULAR) PADS 200 each 3 2/13/2023 -- No     Last Office Visit: 04/30/2024  Future Office visit:    Next 5 appointments (look out 90 days)      Aug 21, 2024 8:00 AM  (Arrive by 7:45 AM)  Provider Visit with Pricilla Henley NP  [facility] [place] ([facility] [place] ) [address]  [place] [address]  [phone]             Routing refill request to provider for review/approval because:

## 2024-06-21 RX ORDER — ISOPROPYL ALCOHOL 0.75 G/1
SWAB TOPICAL
Qty: 200 EACH | Refills: 11 | Status: SHIPPED | OUTPATIENT
Start: 2024-06-21

## 2024-08-05 DIAGNOSIS — J30.1 ALLERGIC RHINITIS DUE TO POLLEN: ICD-10-CM

## 2024-08-05 NOTE — TELEPHONE ENCOUNTER
Fluticasone (Flonase) 50 MCG/ACT nasal spray    Last Written Prescription Date:  05/22/2024  Last Fill Quantity: 16 g,   # refills: 1  Last Office Visit: 04/30/2024

## 2024-08-06 DIAGNOSIS — E11.9 TYPE 2 DIABETES MELLITUS WITHOUT COMPLICATION, WITHOUT LONG-TERM CURRENT USE OF INSULIN (H): Primary | ICD-10-CM

## 2024-08-06 DIAGNOSIS — E11.9 TYPE 2 DIABETES MELLITUS WITHOUT COMPLICATION, WITHOUT LONG-TERM CURRENT USE OF INSULIN (H): ICD-10-CM

## 2024-08-06 RX ORDER — FLUTICASONE PROPIONATE 50 MCG
2 SPRAY, SUSPENSION (ML) NASAL DAILY
Qty: 16 G | Refills: 1 | Status: SHIPPED | OUTPATIENT
Start: 2024-08-06

## 2024-08-06 NOTE — TELEPHONE ENCOUNTER
Blood glucose monitoring (contour)      Last Written Prescription Date:  9/15/2023  Last Fill Quantity: 50,   # refills: 11  Last Office Visit: 4/30/2024  Future Office visit:    Next 5 appointments (look out 90 days)      Aug 21, 2024 8:00 AM  (Arrive by 7:45 AM)  Provider Visit with Pricilla Henley NP  [facility] ([facility] [place] ) [address]  [place] [address]  [phone]             Routing refill request to provider for review/approval because:  Drug not on the [facility], P or  Health refill protocol or controlled substance    PLEASE READ: Contour next no longer covered by pt's insurance. Could we have scripts for Truye Metrix meter and strips as these are covered with no copay? Thank you!

## 2024-08-06 NOTE — TELEPHONE ENCOUNTER
Blood Glucose Monitoring True Metrix      Last Written Prescription Date:  8/6/2024  Last Fill Quantity: 200,   # refills: 1  Last Office Visit: 4/30/2024  Future Office visit:    Next 5 appointments (look out 90 days)      Aug 21, 2024 8:00 AM  (Arrive by 7:45 AM)  Provider Visit with Pricilla Henley NP  [facility] [place] ([facility] [place] ) [address]  [place] [address]  [phone]             Routing refill request to provider for review/approval because:  Pharmacy requesting new script for True Metrix Device Kit

## 2024-08-20 NOTE — PROGRESS NOTES
"  Assessment & Plan     (E11.9) Type 2 diabetes mellitus without complication, without long-term current use of insulin (H)  (primary encounter diagnosis)  Plan: A1C in process. Will notify patient of the results when available and intervene accordingly. On statin. BP at goal. Eye exam UTD. Will see him back in 6 months.     (E78.5) Hyperlipidemia, unspecified hyperlipidemia type  Comment: tolerating statin  Plan: Lipids controlled in 2/2024. Will recheck AST and ALT today.     (I10) Benign essential hypertension  Plan: Well controlled. Continue current medications. Encouraged daily exercise and a low sodium diet. Recommended checking BP's 2x/wk, call the clinic if consistantly s>140 or d>90. Follow up in 6 months.       (J45.20) Mild intermittent asthma without complication  Comment: controlled, ACT 25  Plan: Continue PRN albuterol.     (R56.9) Convulsions, unspecified convulsion type (H)  Comment: has not had seizure in years  Plan: Valproic acid, CBC with platelets and differential        Continue Depakote. F/up 6 months, sooner with new or worsening symptoms.     (R35.0) Urinary frequency  Plan: UA with Microscopic reflex to Culture - [place]        Some increased urinary frequency without pain, however, it is difficult to obtain accurate story. No blood. Does not drink a lot of caffeine. Will update UA today. If normal, he was encouraged to f/up with urologist who repaired his hydrocele in 5/2024.       BMI  Estimated body mass index is 28.46 kg/m  as calculated from the following:    Height as of this encounter: 1.88 m (6' 2\").    Weight as of this encounter: 100.6 kg (221 lb 11.2 oz).       No follow-ups on file.    The longitudinal plan of care for the diagnosis(es)/condition(s) as documented were addressed during this visit. Due to the added complexity in care, I will continue to support Casey in the subsequent management and with ongoing continuity of care.    Silva Peña is a 43 year old, presenting " for the following health issues:  Diabetes, Lipids, Hypertension, Asthma, and Frequency    History of Present Illness       Reason for visit:  Annual physical   He is taking medications regularly.       Diabetes Follow-up    How often are you checking your blood sugar? Once daily  Blood sugar testing frequency justification:  Adjustment of medication(s)  What time of day are you checking your blood sugars (select all that apply)?  Before meals and At bedtime  Have you had any blood sugars above 200?  No  Have you had any blood sugars below 70?  No  What symptoms do you notice when your blood sugar is low?  None  What concerns do you have today about your diabetes? None   Do you have any of these symptoms? (Select all that apply)  No numbness or tingling in feet.  No redness, sores or blisters on feet.  No complaints of excessive thirst.  No reports of blurry vision.  No significant changes to weight.  Have you had a diabetic eye exam in the last 12 months? Yes- Date of last eye exam: [facility],  Location: June 2024  A1c was 5.6 on 2/21/24.   Taking Metformin 500 XR daily without side effects. No diarrhea.   Denies chest pain, shortness of breath, dizziness, syncope, or palpitations.    Recently had eye exam.     Due for several vaccines. Declines.     Hyperlipidemia Follow-Up    Are you regularly taking any medication or supplement to lower your cholesterol?   Yes- Lipitor 40 mg  Are you having muscle aches or other side effects that you think could be caused by your cholesterol lowering medication?  No  Denies chest pain, shortness of breath, dizziness, syncope, or palpitations.      Hypertension Follow-up    Do you check your blood pressure regularly outside of the clinic? Yes every Sunday   Are you following a low salt diet? Yes  Are your blood pressures ever more than 140 on the top number (systolic) OR more   than 90 on the bottom number (diastolic), for example 140/90? No  Taking lisinopril 5 mg without  side effects.     Convulsions; on depakote. Has not had a seizure in years.     BP Readings from Last 2 Encounters:   08/21/24 110/74   04/30/24 106/80     Hemoglobin A1C (%)   Date Value   02/21/2024 5.6   08/21/2023 5.7 (H)   07/20/2020 5.3   01/20/2020 5.4     LDL Cholesterol Calculated (mg/dL)   Date Value   02/21/2024 23   08/21/2023 5   01/20/2020 26   05/01/2018     Cannot estimate LDL when triglyceride exceeds 400 mg/dL         Asthma Follow-Up    Uses albuterol as needed.     Was ACT completed today?  Yes        8/21/2024     7:45 AM   ACT Total Scores   ACT TOTAL SCORE (Goal Greater than or Equal to 20) 25   In the past 12 months, how many times did you visit the emergency room for your asthma without being admitted to the hospital? 0   In the past 12 months, how many times were you hospitalized overnight because of your asthma? 0        How many days per week do you miss taking your asthma controller medication?  0  Please describe any recent triggers for your asthma: Patient is unaware of triggers  Have you had any Emergency Room Visits, Urgent Care Visits, or Hospital Admissions since your last office visit?  No    Genitourinary symptoms    Duration: had hydrocele repaired on May 8th, 2024; has been urinating frequency since this time  Description:  frequency  Intensity:  moderate  Accompanying signs and symptoms (fever/discharge/nausea/vomiting/back or abdominal pain):  None, no fevers, acting like himself, eating and drinking well, does not complain of dysuria or hematuria  History (frequent UTI's/kidney stones/prostate problems): surgery   Sexually active: no   Precipitating or alleviating factors: None  Therapies tried and outcome: none  Rarely drinks coffee.       Review of Systems  Constitutional, HEENT, cardiovascular, pulmonary, gi and gu systems are negative, except as otherwise noted.      Objective    /74 (BP Location: Right arm, Patient Position: Sitting, Cuff Size: Adult Large)    "Pulse 80   Temp 97.6  F (36.4  C) (Tympanic)   Ht 1.88 m (6' 2\")   Wt 100.6 kg (221 lb 11.2 oz)   SpO2 99%   BMI 28.46 kg/m    Body mass index is 28.46 kg/m .  Physical Exam   GENERAL: alert and no distress  EYES: Eyes grossly normal to inspection, PERRL and conjunctivae and sclerae normal  HENT: ear canals and TM's normal, nose and mouth without ulcers or lesions  NECK: no adenopathy, no asymmetry, masses, or scars  RESP: lungs clear to auscultation - no rales, rhonchi or wheezes  CV: regular rate and rhythm, no murmur, click or rub, no peripheral edema  ABDOMEN: soft, nontender, no hepatosplenomegaly, no masses and bowel sounds normal  MS: no gross musculoskeletal defects noted, no edema  NEURO: Normal strength and tone, mentation intact and speech normal  PSYCH: mentation appears normal, affect normal/bright  Diabetic foot exam: normal DP and PT pulses, no trophic changes or ulcerative lesions, and normal sensory exam    Labs in process.         Signed Electronically by: Pricilla Henley NP    "

## 2024-08-21 ENCOUNTER — OFFICE VISIT (OUTPATIENT)
Dept: FAMILY MEDICINE | Facility: OTHER | Age: 44
End: 2024-08-21
Attending: NURSE PRACTITIONER
Payer: MEDICARE

## 2024-08-21 VITALS
OXYGEN SATURATION: 99 % | HEIGHT: 74 IN | WEIGHT: 221.7 LBS | SYSTOLIC BLOOD PRESSURE: 110 MMHG | TEMPERATURE: 97.6 F | HEART RATE: 80 BPM | DIASTOLIC BLOOD PRESSURE: 74 MMHG | BODY MASS INDEX: 28.45 KG/M2

## 2024-08-21 DIAGNOSIS — E78.5 HYPERLIPIDEMIA, UNSPECIFIED HYPERLIPIDEMIA TYPE: ICD-10-CM

## 2024-08-21 DIAGNOSIS — R81 GLUCOSE FOUND IN URINE ON EXAMINATION: ICD-10-CM

## 2024-08-21 DIAGNOSIS — E11.9 TYPE 2 DIABETES MELLITUS WITHOUT COMPLICATION, WITHOUT LONG-TERM CURRENT USE OF INSULIN (H): Primary | ICD-10-CM

## 2024-08-21 DIAGNOSIS — J45.20 MILD INTERMITTENT ASTHMA WITHOUT COMPLICATION: ICD-10-CM

## 2024-08-21 DIAGNOSIS — R56.9 CONVULSIONS, UNSPECIFIED CONVULSION TYPE (H): ICD-10-CM

## 2024-08-21 DIAGNOSIS — I10 BENIGN ESSENTIAL HYPERTENSION: ICD-10-CM

## 2024-08-21 DIAGNOSIS — R35.0 URINARY FREQUENCY: ICD-10-CM

## 2024-08-21 LAB
ALBUMIN SERPL BCG-MCNC: 4.4 G/DL (ref 3.5–5.2)
ALBUMIN UR-MCNC: NEGATIVE MG/DL
ALP SERPL-CCNC: 54 U/L (ref 40–150)
ALT SERPL W P-5'-P-CCNC: 67 U/L (ref 0–70)
ANION GAP SERPL CALCULATED.3IONS-SCNC: 13 MMOL/L (ref 7–15)
APPEARANCE UR: CLEAR
AST SERPL W P-5'-P-CCNC: 36 U/L (ref 0–45)
BASOPHILS # BLD AUTO: 0 10E3/UL (ref 0–0.2)
BASOPHILS NFR BLD AUTO: 1 %
BILIRUB SERPL-MCNC: 0.7 MG/DL
BILIRUB UR QL STRIP: NEGATIVE
BUN SERPL-MCNC: 4.9 MG/DL (ref 6–20)
CALCIUM SERPL-MCNC: 9.2 MG/DL (ref 8.8–10.4)
CHLORIDE SERPL-SCNC: 96 MMOL/L (ref 98–107)
COLOR UR AUTO: ABNORMAL
CREAT SERPL-MCNC: 0.65 MG/DL (ref 0.67–1.17)
CREAT UR-MCNC: 13.4 MG/DL
EGFRCR SERPLBLD CKD-EPI 2021: >90 ML/MIN/1.73M2
EOSINOPHIL # BLD AUTO: 0.2 10E3/UL (ref 0–0.7)
EOSINOPHIL NFR BLD AUTO: 5 %
ERYTHROCYTE [DISTWIDTH] IN BLOOD BY AUTOMATED COUNT: 12.3 % (ref 10–15)
EST. AVERAGE GLUCOSE BLD GHB EST-MCNC: 123 MG/DL
GLUCOSE SERPL-MCNC: 210 MG/DL (ref 70–99)
GLUCOSE UR STRIP-MCNC: 500 MG/DL
HBA1C MFR BLD: 5.9 %
HCO3 SERPL-SCNC: 23 MMOL/L (ref 22–29)
HCT VFR BLD AUTO: 44.6 % (ref 40–53)
HGB BLD-MCNC: 15.7 G/DL (ref 13.3–17.7)
HGB UR QL STRIP: NEGATIVE
IMM GRANULOCYTES # BLD: 0 10E3/UL
IMM GRANULOCYTES NFR BLD: 0 %
KETONES UR STRIP-MCNC: NEGATIVE MG/DL
LEUKOCYTE ESTERASE UR QL STRIP: NEGATIVE
LYMPHOCYTES # BLD AUTO: 1.6 10E3/UL (ref 0.8–5.3)
LYMPHOCYTES NFR BLD AUTO: 34 %
MCH RBC QN AUTO: 29.2 PG (ref 26.5–33)
MCHC RBC AUTO-ENTMCNC: 35.2 G/DL (ref 31.5–36.5)
MCV RBC AUTO: 83 FL (ref 78–100)
MICROALBUMIN UR-MCNC: <12 MG/L
MICROALBUMIN/CREAT UR: NORMAL MG/G{CREAT}
MONOCYTES # BLD AUTO: 0.4 10E3/UL (ref 0–1.3)
MONOCYTES NFR BLD AUTO: 8 %
NEUTROPHILS # BLD AUTO: 2.3 10E3/UL (ref 1.6–8.3)
NEUTROPHILS NFR BLD AUTO: 52 %
NITRATE UR QL: NEGATIVE
NRBC # BLD AUTO: 0 10E3/UL
NRBC BLD AUTO-RTO: 0 /100
PH UR STRIP: 6 [PH] (ref 5–7)
PHOSPHATE SERPL-MCNC: 3.1 MG/DL (ref 2.5–4.5)
PLATELET # BLD AUTO: 162 10E3/UL (ref 150–450)
POTASSIUM SERPL-SCNC: 4 MMOL/L (ref 3.4–5.3)
PROT SERPL-MCNC: 6.7 G/DL (ref 6.4–8.3)
RBC # BLD AUTO: 5.38 10E6/UL (ref 4.4–5.9)
RBC URINE: 0 /HPF
SODIUM SERPL-SCNC: 132 MMOL/L (ref 135–145)
SP GR UR STRIP: 1 (ref 1–1.03)
SQUAMOUS EPITHELIAL: 0 /HPF
UROBILINOGEN UR STRIP-MCNC: NORMAL MG/DL
VALPROATE SERPL-MCNC: 50.5 UG/ML
WBC # BLD AUTO: 4.5 10E3/UL (ref 4–11)
WBC URINE: 0 /HPF

## 2024-08-21 PROCEDURE — 99214 OFFICE O/P EST MOD 30 MIN: CPT | Performed by: NURSE PRACTITIONER

## 2024-08-21 PROCEDURE — 85004 AUTOMATED DIFF WBC COUNT: CPT | Mod: ZL | Performed by: NURSE PRACTITIONER

## 2024-08-21 PROCEDURE — 80164 ASSAY DIPROPYLACETIC ACD TOT: CPT | Mod: ZL | Performed by: NURSE PRACTITIONER

## 2024-08-21 PROCEDURE — G2211 COMPLEX E/M VISIT ADD ON: HCPCS | Performed by: NURSE PRACTITIONER

## 2024-08-21 PROCEDURE — 84100 ASSAY OF PHOSPHORUS: CPT | Mod: ZL | Performed by: NURSE PRACTITIONER

## 2024-08-21 PROCEDURE — 80053 COMPREHEN METABOLIC PANEL: CPT | Mod: ZL | Performed by: NURSE PRACTITIONER

## 2024-08-21 PROCEDURE — 81001 URINALYSIS AUTO W/SCOPE: CPT | Mod: ZL | Performed by: NURSE PRACTITIONER

## 2024-08-21 PROCEDURE — 36415 COLL VENOUS BLD VENIPUNCTURE: CPT | Mod: ZL | Performed by: NURSE PRACTITIONER

## 2024-08-21 PROCEDURE — G0463 HOSPITAL OUTPT CLINIC VISIT: HCPCS

## 2024-08-21 PROCEDURE — 82043 UR ALBUMIN QUANTITATIVE: CPT | Mod: ZL | Performed by: NURSE PRACTITIONER

## 2024-08-21 PROCEDURE — 84105 ASSAY OF URINE PHOSPHORUS: CPT | Mod: ZL | Performed by: NURSE PRACTITIONER

## 2024-08-21 PROCEDURE — 83036 HEMOGLOBIN GLYCOSYLATED A1C: CPT | Mod: ZL | Performed by: NURSE PRACTITIONER

## 2024-08-21 RX ORDER — ATORVASTATIN CALCIUM 40 MG/1
40 TABLET, FILM COATED ORAL DAILY
Qty: 90 TABLET | Refills: 3 | Status: SHIPPED | OUTPATIENT
Start: 2024-08-21

## 2024-08-21 RX ORDER — LISINOPRIL 5 MG/1
5 TABLET ORAL DAILY
Qty: 90 TABLET | Refills: 3 | Status: SHIPPED | OUTPATIENT
Start: 2024-08-21

## 2024-08-21 ASSESSMENT — ASTHMA QUESTIONNAIRES
ACT_TOTALSCORE: 25
QUESTION_4 LAST FOUR WEEKS HOW OFTEN HAVE YOU USED YOUR RESCUE INHALER OR NEBULIZER MEDICATION (SUCH AS ALBUTEROL): NOT AT ALL
QUESTION_2 LAST FOUR WEEKS HOW OFTEN HAVE YOU HAD SHORTNESS OF BREATH: NOT AT ALL
QUESTION_3 LAST FOUR WEEKS HOW OFTEN DID YOUR ASTHMA SYMPTOMS (WHEEZING, COUGHING, SHORTNESS OF BREATH, CHEST TIGHTNESS OR PAIN) WAKE YOU UP AT NIGHT OR EARLIER THAN USUAL IN THE MORNING: NOT AT ALL
QUESTION_5 LAST FOUR WEEKS HOW WOULD YOU RATE YOUR ASTHMA CONTROL: COMPLETELY CONTROLLED
ACT_TOTALSCORE: 25
QUESTION_1 LAST FOUR WEEKS HOW MUCH OF THE TIME DID YOUR ASTHMA KEEP YOU FROM GETTING AS MUCH DONE AT WORK, SCHOOL OR AT HOME: NONE OF THE TIME

## 2024-08-21 ASSESSMENT — PAIN SCALES - GENERAL: PAINLEVEL: NO PAIN (0)

## 2024-08-22 LAB
CREAT UR-MCNC: 12.9 MG/DL
PHOSPHATE 24H UR-MCNC: 0.62 G/G CR
PHOSPHATE UR-MCNC: 8 MG/DL (ref 40–136)

## 2024-08-30 ENCOUNTER — LAB (OUTPATIENT)
Dept: LAB | Facility: OTHER | Age: 44
End: 2024-08-30
Payer: MEDICARE

## 2024-08-30 DIAGNOSIS — R81 GLUCOSE FOUND IN URINE ON EXAMINATION: ICD-10-CM

## 2024-08-30 LAB
ALBUMIN SERPL BCG-MCNC: 4.3 G/DL (ref 3.5–5.2)
ALBUMIN UR-MCNC: NEGATIVE MG/DL
ALP SERPL-CCNC: 51 U/L (ref 40–150)
ALT SERPL W P-5'-P-CCNC: 67 U/L (ref 0–70)
ANION GAP SERPL CALCULATED.3IONS-SCNC: 13 MMOL/L (ref 7–15)
APPEARANCE UR: CLEAR
AST SERPL W P-5'-P-CCNC: 36 U/L (ref 0–45)
BILIRUB SERPL-MCNC: 0.7 MG/DL
BILIRUB UR QL STRIP: NEGATIVE
BUN SERPL-MCNC: 6.3 MG/DL (ref 6–20)
CALCIUM SERPL-MCNC: 9.3 MG/DL (ref 8.8–10.4)
CHLORIDE SERPL-SCNC: 98 MMOL/L (ref 98–107)
COLOR UR AUTO: NORMAL
CREAT SERPL-MCNC: 0.62 MG/DL (ref 0.67–1.17)
CREAT UR-MCNC: 47.1 MG/DL
EGFRCR SERPLBLD CKD-EPI 2021: >90 ML/MIN/1.73M2
GLUCOSE SERPL-MCNC: 104 MG/DL (ref 70–99)
GLUCOSE UR STRIP-MCNC: NEGATIVE MG/DL
HCO3 SERPL-SCNC: 21 MMOL/L (ref 22–29)
HGB UR QL STRIP: NEGATIVE
KETONES UR STRIP-MCNC: NEGATIVE MG/DL
LEUKOCYTE ESTERASE UR QL STRIP: NEGATIVE
NITRATE UR QL: NEGATIVE
PH UR STRIP: 6.5 [PH] (ref 4.7–8)
POTASSIUM SERPL-SCNC: 4.5 MMOL/L (ref 3.4–5.3)
PROT SERPL-MCNC: 6.6 G/DL (ref 6.4–8.3)
RBC URINE: <1 /HPF
SODIUM SERPL-SCNC: 132 MMOL/L (ref 135–145)
SP GR UR STRIP: 1.01 (ref 1–1.03)
SQUAMOUS EPITHELIAL: 0 /HPF
URATE 24H UR-MRATE: 0.32 G/G CR
URATE UR-MCNC: 15.3 MG/DL (ref 37–92)
UROBILINOGEN UR STRIP-MCNC: NORMAL MG/DL
WBC URINE: <1 /HPF

## 2024-08-30 PROCEDURE — 81001 URINALYSIS AUTO W/SCOPE: CPT | Mod: ZL

## 2024-08-30 PROCEDURE — 80053 COMPREHEN METABOLIC PANEL: CPT | Mod: ZL

## 2024-08-30 PROCEDURE — 36415 COLL VENOUS BLD VENIPUNCTURE: CPT | Mod: ZL

## 2024-08-30 PROCEDURE — 84560 ASSAY OF URINE/URIC ACID: CPT | Mod: ZL | Performed by: NURSE PRACTITIONER

## 2024-09-09 DIAGNOSIS — J30.2 CHRONIC SEASONAL ALLERGIC RHINITIS: ICD-10-CM

## 2024-09-09 RX ORDER — CETIRIZINE HYDROCHLORIDE 10 MG/1
TABLET ORAL
Qty: 30 TABLET | Refills: 8 | Status: SHIPPED | OUTPATIENT
Start: 2024-09-09

## 2024-09-26 DIAGNOSIS — Z00.00 HEALTH CARE MAINTENANCE: ICD-10-CM

## 2024-09-26 RX ORDER — CHLORAL HYDRATE 500 MG
CAPSULE ORAL
Qty: 100 CAPSULE | Refills: 5 | Status: SHIPPED | OUTPATIENT
Start: 2024-09-26

## 2024-09-30 ENCOUNTER — HOSPITAL ENCOUNTER (EMERGENCY)
Facility: HOSPITAL | Age: 44
Discharge: HOME | End: 2024-09-30
Attending: PHYSICIAN ASSISTANT
Payer: MEDICARE

## 2024-09-30 ENCOUNTER — APPOINTMENT (OUTPATIENT)
Dept: GENERAL RADIOLOGY | Facility: HOSPITAL | Age: 44
End: 2024-09-30
Attending: PHYSICIAN ASSISTANT
Payer: MEDICARE

## 2024-09-30 VITALS
OXYGEN SATURATION: 99 % | DIASTOLIC BLOOD PRESSURE: 82 MMHG | HEART RATE: 102 BPM | SYSTOLIC BLOOD PRESSURE: 118 MMHG | RESPIRATION RATE: 18 BRPM | TEMPERATURE: 97.7 F

## 2024-09-30 DIAGNOSIS — R05.1 ACUTE COUGH: ICD-10-CM

## 2024-09-30 PROCEDURE — 99213 OFFICE O/P EST LOW 20 MIN: CPT | Performed by: PHYSICIAN ASSISTANT

## 2024-09-30 PROCEDURE — G0463 HOSPITAL OUTPT CLINIC VISIT: HCPCS | Mod: 25

## 2024-09-30 PROCEDURE — 71046 X-RAY EXAM CHEST 2 VIEWS: CPT

## 2024-09-30 RX ORDER — BENZONATATE 100 MG/1
100 CAPSULE ORAL 3 TIMES DAILY PRN
Qty: 30 CAPSULE | Refills: 0 | Status: SHIPPED | OUTPATIENT
Start: 2024-09-30

## 2024-09-30 ASSESSMENT — COLUMBIA-SUICIDE SEVERITY RATING SCALE - C-SSRS: IS THE PATIENT NOT ABLE TO COMPLETE C-SSRS: UNABLE TO VERBALIZE

## 2024-09-30 ASSESSMENT — ACTIVITIES OF DAILY LIVING (ADL): ADLS_ACUITY_SCORE: 35

## 2024-09-30 NOTE — ED PROVIDER NOTES
History     Chief Complaint   Patient presents with    Cough     HPI  Casey Pedro is a 43 year old male who presents for cough for 10 days. Using Tussin DM without relief. No fever, chills, nausea, vomting.  No rhinorrhea, ear pain, sore throat.        Allergies:  No Known Allergies    Problem List:    Patient Active Problem List    Diagnosis Date Noted    Hyperlipidemia, unspecified hyperlipidemia type 08/10/2022     Priority: Medium    Gastroesophageal reflux disease without esophagitis 08/10/2022     Priority: Medium    Benign essential hypertension 08/10/2022     Priority: Medium    Type 2 diabetes mellitus without complication, without long-term current use of insulin (H) 04/24/2019     Priority: Medium    Mild intermittent asthma without complication 02/20/2018     Priority: Medium    Slow transit constipation 02/20/2018     Priority: Medium    Seasonal allergic rhinitis 09/27/2016     Priority: Medium    Disruptive behavior disorder 08/11/2016     Priority: Medium    Annual NHS Examination 01/06/2015     Priority: Medium    BPH (begnign prostatic hyperplasia) 09/16/2011     Priority: Medium    Mental retardation 09/16/2011     Priority: Medium    Autism 09/16/2011     Priority: Medium    Convulsions (H) 06/21/2007     Priority: Medium     Formatting of this note might be different from the original.   IMO Update 10/11      Thrombocytopenia (H) 06/21/2007     Priority: Medium     Formatting of this note might be different from the original.   IMO Update 10/11          Past Medical History:    Past Medical History:   Diagnosis Date    Autism 09/16/2011    BPH 09/16/2011    Diabetes mellitus, type 2 (H)     Mental retardation 09/16/2011       Past Surgical History:    No past surgical history on file.    Family History:    Family History   Problem Relation Age of Onset    Alcohol/Drug Father         alcoholism       Social History:  Marital Status:  Single [1]  Social History     Tobacco Use    Smoking  status: Never    Smokeless tobacco: Never    Tobacco comments:     no passive exposure   Vaping Use    Vaping status: Never Used   Substance Use Topics    Alcohol use: No    Drug use: No        Medications:    aspirin (ASPIRIN LOW DOSE) 81 MG EC tablet  atorvastatin (LIPITOR) 40 MG tablet  benzonatate (TESSALON) 100 MG capsule  cetirizine (ZYRTEC) 10 MG tablet  divalproex sodium delayed-release (DEPAKOTE) 500 MG DR tablet  famotidine (PEPCID) 20 MG tablet  fish oil-omega-3 fatty acids 1000 MG capsule  fluticasone (FLONASE) 50 MCG/ACT nasal spray  guanFACINE (TENEX) 1 MG tablet  lisinopril (ZESTRIL) 5 MG tablet  Magnesium Hydroxide (MILK OF MAGNESIA PO)  metFORMIN (GLUCOPHAGE XR) 500 MG 24 hr tablet  OLANZapine (ZYPREXA) 20 MG tablet  risperiDONE (RISPERDAL) 0.25 MG tablet  senna (SENOKOT) 8.6 MG tablet  VITAMIN D3 50 MCG (2000 UT) tablet  acetaminophen (TYLENOL) 325 MG tablet  Alcohol Swabs (B-D SINGLE USE SWABS REGULAR) PADS  Black Elderberry (SAMBUCOL BLACK ELDERBERRY) 1.9 GM/5ML SYRP  blood glucose (CONTOUR NEXT TEST) test strip  blood glucose (NO BRAND SPECIFIED) test strip  blood glucose (NO BRAND SPECIFIED) test strip  Blood Glucose Monitoring Suppl (TRUE METRIX METER) w/Device KIT  calcium carbonate (TUMS) 500 MG chewable tablet  ibuprofen (ADVIL/MOTRIN) 800 MG tablet  loperamide (IMODIUM) 2 MG capsule  LORazepam (ATIVAN) 1 MG tablet  Microlet Lancets MISC  OLANZapine (ZYPREXA) 5 MG tablet  OLANZapine zydis (ZYPREXA) 10 MG ODT  polyethylene glycol (MIRALAX) 17 GM/Dose powder  pseudoePHEDrine (SUDAFED) 30 MG tablet  risperiDONE (RISPERDAL) 0.5 MG tablet  senna-docusate (SENOKOT-S/PERICOLACE) 8.6-50 MG tablet  UNABLE TO FIND  UNABLE TO FIND          Review of Systems   All other systems reviewed and are negative.      Physical Exam   BP: 118/82  Pulse: 102  Temp: 97.7  F (36.5  C)  Resp: 18  SpO2: 99 %      Physical Exam  Constitutional:       General: He is not in acute distress.     Appearance: Normal  appearance. He is normal weight. He is not ill-appearing, toxic-appearing or diaphoretic.   HENT:      Head: Normocephalic and atraumatic.      Right Ear: External ear normal.      Left Ear: External ear normal.   Eyes:      Extraocular Movements: Extraocular movements intact.      Conjunctiva/sclera: Conjunctivae normal.      Pupils: Pupils are equal, round, and reactive to light.   Cardiovascular:      Rate and Rhythm: Normal rate.   Pulmonary:      Effort: Pulmonary effort is normal. No respiratory distress.      Breath sounds: Rhonchi present.   Musculoskeletal:         General: Normal range of motion.   Skin:     General: Skin is warm and dry.      Coloration: Skin is not jaundiced or pale.   Neurological:      Mental Status: He is alert and oriented to person, place, and time. Mental status is at baseline.      Cranial Nerves: No cranial nerve deficit.   Psychiatric:         Mood and Affect: Mood normal.         ED Course      Chest x-ray obtained no acute findings.  Will change to Tessalon Perles and have follow-up with primary care doctor.  If symptoms worsen return to the ER or urgent care..  Procedures                  No results found for this or any previous visit (from the past 24 hour(s)).    Medications - No data to display    Assessments & Plan (with Medical Decision Making)     I have reviewed the nursing notes.    I have reviewed the findings, diagnosis, plan and need for follow up with the patient.          Discharge Medication List as of 9/30/2024 12:31 PM        START taking these medications    Details   benzonatate (TESSALON) 100 MG capsule Take 1 capsule (100 mg) by mouth 3 times daily as needed for cough., Disp-30 capsule, R-0, E-Prescribe             Final diagnoses:   Acute cough       9/30/2024   [facility]       Michael Mendoza PA-C  09/30/24 1306       Michael Mendoza PA-C  10/05/24 1040

## 2024-10-07 DIAGNOSIS — E55.9 VITAMIN D DEFICIENCY: ICD-10-CM

## 2024-10-07 RX ORDER — CHOLECALCIFEROL (VITAMIN D3) 50 MCG
TABLET ORAL
Qty: 100 TABLET | Refills: 2 | Status: SHIPPED | OUTPATIENT
Start: 2024-10-07

## 2024-10-10 DIAGNOSIS — Z79.899 HIGH RISK MEDICATION USE: Primary | ICD-10-CM

## 2024-10-17 DIAGNOSIS — E11.9 TYPE 2 DIABETES MELLITUS WITHOUT COMPLICATION, WITHOUT LONG-TERM CURRENT USE OF INSULIN (H): ICD-10-CM

## 2024-10-17 RX ORDER — ASPIRIN 81 MG/1
81 TABLET, COATED ORAL DAILY
Qty: 120 TABLET | Refills: 1 | Status: SHIPPED | OUTPATIENT
Start: 2024-10-17

## 2024-10-17 NOTE — TELEPHONE ENCOUNTER
aspirin (ASPIRIN LOW DOSE) 81 MG EC tablet       Last Written Prescription Date:  6/20/24  Last Fill Quantity: 120,   # refills: 1  Last Office Visit: 8/21/24  Future Office visit:       Routing refill request to provider for review/approval because:  Analgesics (Non-Narcotic Tylenol and ASA Only) Failed      Recent (12 mo) or future (90 days) visit within the authorizing provider's specialty    The patient must have completed an in-person or virtual visit within the past 12 months or has a future visit scheduled within the next 90 days with the authorizing provider s specialty.  Urgent care and e-visits do not quality as an office visit for this protocol.

## 2024-10-21 ENCOUNTER — LAB (OUTPATIENT)
Dept: LAB | Facility: OTHER | Age: 44
End: 2024-10-21
Payer: MEDICARE

## 2024-10-21 DIAGNOSIS — Z79.899 HIGH RISK MEDICATION USE: ICD-10-CM

## 2024-10-21 LAB
ALBUMIN SERPL BCG-MCNC: 4.2 G/DL (ref 3.5–5.2)
ALP SERPL-CCNC: 60 U/L (ref 40–150)
ALT SERPL W P-5'-P-CCNC: 45 U/L (ref 0–70)
AMMONIA PLAS-SCNC: 38 UMOL/L (ref 16–60)
AST SERPL W P-5'-P-CCNC: 25 U/L (ref 0–45)
BASOPHILS # BLD AUTO: 0 10E3/UL (ref 0–0.2)
BASOPHILS NFR BLD AUTO: 0 %
BILIRUB DIRECT SERPL-MCNC: <0.2 MG/DL (ref 0–0.3)
BILIRUB SERPL-MCNC: 0.3 MG/DL
EOSINOPHIL # BLD AUTO: 0.2 10E3/UL (ref 0–0.7)
EOSINOPHIL NFR BLD AUTO: 3 %
ERYTHROCYTE [DISTWIDTH] IN BLOOD BY AUTOMATED COUNT: 12.9 % (ref 10–15)
HCT VFR BLD AUTO: 44.6 % (ref 40–53)
HGB BLD-MCNC: 15.2 G/DL (ref 13.3–17.7)
IMM GRANULOCYTES # BLD: 0 10E3/UL
IMM GRANULOCYTES NFR BLD: 0 %
LYMPHOCYTES # BLD AUTO: 1.5 10E3/UL (ref 0.8–5.3)
LYMPHOCYTES NFR BLD AUTO: 26 %
MCH RBC QN AUTO: 29.2 PG (ref 26.5–33)
MCHC RBC AUTO-ENTMCNC: 34.1 G/DL (ref 31.5–36.5)
MCV RBC AUTO: 86 FL (ref 78–100)
MONOCYTES # BLD AUTO: 0.4 10E3/UL (ref 0–1.3)
MONOCYTES NFR BLD AUTO: 7 %
NEUTROPHILS # BLD AUTO: 3.6 10E3/UL (ref 1.6–8.3)
NEUTROPHILS NFR BLD AUTO: 64 %
NRBC # BLD AUTO: 0 10E3/UL
NRBC BLD AUTO-RTO: 0 /100
PLATELET # BLD AUTO: 179 10E3/UL (ref 150–450)
PROT SERPL-MCNC: 6.7 G/DL (ref 6.4–8.3)
RBC # BLD AUTO: 5.2 10E6/UL (ref 4.4–5.9)
VALPROATE SERPL-MCNC: 51.7 UG/ML
WBC # BLD AUTO: 5.6 10E3/UL (ref 4–11)

## 2024-10-21 PROCEDURE — 36415 COLL VENOUS BLD VENIPUNCTURE: CPT | Mod: ZL

## 2024-10-21 PROCEDURE — 80164 ASSAY DIPROPYLACETIC ACD TOT: CPT | Mod: ZL

## 2024-10-21 PROCEDURE — 80076 HEPATIC FUNCTION PANEL: CPT | Mod: ZL

## 2024-10-21 PROCEDURE — 85025 COMPLETE CBC W/AUTO DIFF WBC: CPT | Mod: ZL

## 2024-10-21 PROCEDURE — 82140 ASSAY OF AMMONIA: CPT | Mod: ZL

## 2024-11-05 ENCOUNTER — TELEPHONE (OUTPATIENT)
Dept: FAMILY MEDICINE | Facility: OTHER | Age: 44
End: 2024-11-05

## 2024-11-05 NOTE — PROGRESS NOTES
"  Assessment & Plan     (J06.9) Acute URI  (primary encounter diagnosis)  (R06.2) Wheezing  Comment: patient with URI symptoms times 8 weeks  Plan: BNP-N terminal pro        Labs and CXR ordered. Will notify patient of the results when available and intervene accordingly. Will also collect multiplex. Will notify patient of the results when available and intervene accordingly.     Since he has had symptoms for 8 weeks, will also order azithromycin. Symptomatic cares were also encouraged. The patient will follow up with new or worsening symptoms.     The longitudinal plan of care for the diagnosis(es)/condition(s) as documented were addressed during this visit. Due to the added complexity in care, I will continue to support Casey in the subsequent management and with ongoing continuity of care.    Silva Peña is a 44 year old, presenting for the following health issues:  Illness    HPI     Acute Illness  Acute illness concerns: dry cough   Onset/Duration: since the end of September   Symptoms:  Fever: No  Chills/Sweats: No  Headache (location?): No  Sinus Pressure: No  Conjunctivitis:  No  Ear Pain: no  Rhinorrhea: No  Congestion: No  Sore Throat: No  Cough: YES-non-productive - coughing so hard he gags   Wheeze: No  Decreased Appetite: No  Nausea: No  Vomiting: No  Diarrhea: No  Dysuria/Freq.: No  Dysuria or Hematuria: No  Fatigue/Achiness: No  Sick/Strep Exposure: No  Therapies tried and outcome: Tessalon - with no relief   He does not smoke.   Eating and drinking well.       Review of Systems  Constitutional, HEENT, cardiovascular, pulmonary, gi and gu systems are negative, except as otherwise noted.      Objective    /76 (BP Location: Right arm, Patient Position: Sitting, Cuff Size: Adult Regular)   Pulse 67   Temp 97.8  F (36.6  C) (Tympanic)   Resp 16   Ht 1.88 m (6' 2\")   Wt 95.3 kg (210 lb 2 oz)   SpO2 97%   BMI 26.98 kg/m    Body mass index is 26.98 kg/m .  Physical Exam   GENERAL: alert and " no distress  EYES: Eyes grossly normal to inspection, PERRL and conjunctivae and sclerae normal  HENT: ear canals and TM's normal, nose and mouth without ulcers or lesions  NECK: no adenopathy, no asymmetry, masses, or scars  RESP: lungs clear to auscultation - no rales, rhonchi or wheezes  CV: regular rate and rhythm, normal S1 S2, no S3 or S4, no murmur, click or rub, no peripheral edema  ABDOMEN: soft, nontender, no hepatosplenomegaly, no masses and bowel sounds normal  MS: no gross musculoskeletal defects noted, no edema  NEURO: Normal strength and tone, mentation intact and speech normal  PSYCH: mentation appears normal, affect normal/bright    CXR, labs, and multiplex in process        Signed Electronically by: Pricilla Henley NP

## 2024-11-05 NOTE — TELEPHONE ENCOUNTER
10:07 AM    Reason for Call: OVERBOOK    Patient is having the following symptoms: [facility] called to get an appointment this week. Patient has had a cough since 09/30 that is not going away.     The patient is requesting an appointment for exam with NIKKY Henley.    Was an appointment offered for this call? Yes  If yes : Appointment type: It was 12/6, too far out for Office visit              Date; 12/6    Preferred method for responding to this message: Telephone Call  What is your phone number ?  [phone], [place] to speak with any staff    If we cannot reach you directly, may we leave a detailed response at the number you provided? Yes    Can this message wait until your PCP/provider returns, if unavailable today? Not applicable    Mariah Harris

## 2024-11-06 ENCOUNTER — ANCILLARY PROCEDURE (OUTPATIENT)
Dept: GENERAL RADIOLOGY | Facility: OTHER | Age: 44
End: 2024-11-06
Attending: NURSE PRACTITIONER
Payer: MEDICARE

## 2024-11-06 ENCOUNTER — OFFICE VISIT (OUTPATIENT)
Dept: FAMILY MEDICINE | Facility: OTHER | Age: 44
End: 2024-11-06
Attending: NURSE PRACTITIONER
Payer: MEDICARE

## 2024-11-06 VITALS
WEIGHT: 210.13 LBS | TEMPERATURE: 97.8 F | RESPIRATION RATE: 16 BRPM | OXYGEN SATURATION: 97 % | SYSTOLIC BLOOD PRESSURE: 114 MMHG | HEART RATE: 67 BPM | BODY MASS INDEX: 26.97 KG/M2 | DIASTOLIC BLOOD PRESSURE: 76 MMHG | HEIGHT: 74 IN

## 2024-11-06 DIAGNOSIS — J06.9 ACUTE URI: Primary | ICD-10-CM

## 2024-11-06 DIAGNOSIS — J06.9 ACUTE URI: ICD-10-CM

## 2024-11-06 DIAGNOSIS — R06.2 WHEEZING: ICD-10-CM

## 2024-11-06 LAB
ALBUMIN SERPL BCG-MCNC: 4.5 G/DL (ref 3.5–5.2)
ALP SERPL-CCNC: 59 U/L (ref 40–150)
ALT SERPL W P-5'-P-CCNC: 43 U/L (ref 0–70)
ANION GAP SERPL CALCULATED.3IONS-SCNC: 12 MMOL/L (ref 7–15)
AST SERPL W P-5'-P-CCNC: 27 U/L (ref 0–45)
BASOPHILS # BLD AUTO: 0 10E3/UL (ref 0–0.2)
BASOPHILS NFR BLD AUTO: 1 %
BILIRUB SERPL-MCNC: 0.5 MG/DL
BUN SERPL-MCNC: 8.5 MG/DL (ref 6–20)
CALCIUM SERPL-MCNC: 9.6 MG/DL (ref 8.8–10.4)
CHLORIDE SERPL-SCNC: 105 MMOL/L (ref 98–107)
CREAT SERPL-MCNC: 0.63 MG/DL (ref 0.67–1.17)
EGFRCR SERPLBLD CKD-EPI 2021: >90 ML/MIN/1.73M2
EOSINOPHIL # BLD AUTO: 0.2 10E3/UL (ref 0–0.7)
EOSINOPHIL NFR BLD AUTO: 4 %
ERYTHROCYTE [DISTWIDTH] IN BLOOD BY AUTOMATED COUNT: 12.8 % (ref 10–15)
FLUAV RNA SPEC QL NAA+PROBE: NEGATIVE
FLUBV RNA RESP QL NAA+PROBE: NEGATIVE
GLUCOSE SERPL-MCNC: 98 MG/DL (ref 70–99)
HCO3 SERPL-SCNC: 23 MMOL/L (ref 22–29)
HCT VFR BLD AUTO: 46.5 % (ref 40–53)
HGB BLD-MCNC: 16.1 G/DL (ref 13.3–17.7)
IMM GRANULOCYTES # BLD: 0 10E3/UL
IMM GRANULOCYTES NFR BLD: 0 %
LYMPHOCYTES # BLD AUTO: 2.1 10E3/UL (ref 0.8–5.3)
LYMPHOCYTES NFR BLD AUTO: 39 %
MCH RBC QN AUTO: 28.9 PG (ref 26.5–33)
MCHC RBC AUTO-ENTMCNC: 34.6 G/DL (ref 31.5–36.5)
MCV RBC AUTO: 84 FL (ref 78–100)
MONOCYTES # BLD AUTO: 0.6 10E3/UL (ref 0–1.3)
MONOCYTES NFR BLD AUTO: 10 %
NEUTROPHILS # BLD AUTO: 2.5 10E3/UL (ref 1.6–8.3)
NEUTROPHILS NFR BLD AUTO: 46 %
NRBC # BLD AUTO: 0 10E3/UL
NRBC BLD AUTO-RTO: 0 /100
NT-PROBNP SERPL-MCNC: <36 PG/ML (ref 0–450)
PLATELET # BLD AUTO: 228 10E3/UL (ref 150–450)
POTASSIUM SERPL-SCNC: 4.5 MMOL/L (ref 3.4–5.3)
PROT SERPL-MCNC: 7.4 G/DL (ref 6.4–8.3)
RBC # BLD AUTO: 5.57 10E6/UL (ref 4.4–5.9)
RSV RNA SPEC NAA+PROBE: NEGATIVE
SARS-COV-2 RNA RESP QL NAA+PROBE: NEGATIVE
SODIUM SERPL-SCNC: 140 MMOL/L (ref 135–145)
WBC # BLD AUTO: 5.4 10E3/UL (ref 4–11)

## 2024-11-06 PROCEDURE — 80053 COMPREHEN METABOLIC PANEL: CPT | Mod: ZL | Performed by: NURSE PRACTITIONER

## 2024-11-06 PROCEDURE — 71046 X-RAY EXAM CHEST 2 VIEWS: CPT | Mod: TC

## 2024-11-06 PROCEDURE — 85004 AUTOMATED DIFF WBC COUNT: CPT | Mod: ZL | Performed by: NURSE PRACTITIONER

## 2024-11-06 PROCEDURE — 99214 OFFICE O/P EST MOD 30 MIN: CPT | Performed by: NURSE PRACTITIONER

## 2024-11-06 PROCEDURE — 87637 SARSCOV2&INF A&B&RSV AMP PRB: CPT | Mod: ZL | Performed by: NURSE PRACTITIONER

## 2024-11-06 PROCEDURE — G0463 HOSPITAL OUTPT CLINIC VISIT: HCPCS | Mod: 25 | Performed by: NURSE PRACTITIONER

## 2024-11-06 PROCEDURE — G2211 COMPLEX E/M VISIT ADD ON: HCPCS | Performed by: NURSE PRACTITIONER

## 2024-11-06 PROCEDURE — 83880 ASSAY OF NATRIURETIC PEPTIDE: CPT | Mod: ZL | Performed by: NURSE PRACTITIONER

## 2024-11-06 PROCEDURE — 36415 COLL VENOUS BLD VENIPUNCTURE: CPT | Mod: ZL | Performed by: NURSE PRACTITIONER

## 2024-11-06 RX ORDER — AZITHROMYCIN 250 MG/1
TABLET, FILM COATED ORAL
Qty: 6 TABLET | Refills: 0 | Status: SHIPPED | OUTPATIENT
Start: 2024-11-06 | End: 2024-11-11

## 2024-11-06 NOTE — LETTER
November 6, 2024      Casey Pedro  [address] [facility] [address]        Dear ,    We are writing to inform you of your test results.    Your test results fall within the expected range(s) or remain unchanged from previous results.  Please continue with current treatment plan.    Resulted Orders   BNP-N terminal pro   Result Value Ref Range    N Terminal Pro BNP Outpatient <36 0 - 450 pg/mL      Comment:      Reference range shown and results flagged as abnormal are for the outpatient, non acute settings. Establishing a baseline value for each individual patient is useful for follow-up.    Suggested inpatient cut points for confirming diagnosis of CHF in an acute setting are:  >450 pg/mL (age 18 to less than 50)  >900 pg/mL (age 50 to less than 75)  >1800 pg/mL (75 yrs and older)    An inpatient or emergency department NT-proPBNP <300 pg/mL effectively rules out acute CHF, with 99% negative predictive value.       Comprehensive metabolic panel (BMP + Alb, Alk Phos, ALT, AST, Total. Bili, TP)   Result Value Ref Range    Sodium 140 135 - 145 mmol/L    Potassium 4.5 3.4 - 5.3 mmol/L    Carbon Dioxide (CO2) 23 22 - 29 mmol/L    Anion Gap 12 7 - 15 mmol/L    Urea Nitrogen 8.5 6.0 - 20.0 mg/dL    Creatinine 0.63 (L) 0.67 - 1.17 mg/dL    GFR Estimate >90 >60 mL/min/1.73m2      Comment:      eGFR calculated using 2021 CKD-EPI equation.    Calcium 9.6 8.8 - 10.4 mg/dL      Comment:      Reference intervals for this test were updated on 7/16/2024 to reflect our healthy population more accurately. There may be differences in the flagging of prior results with similar values performed with this method. Those prior results can be interpreted in the context of the updated reference intervals.    Chloride 105 98 - 107 mmol/L    Glucose 98 70 - 99 mg/dL    Alkaline Phosphatase 59 40 - 150 U/L    AST 27 0 - 45 U/L    ALT 43 0 - 70 U/L    Protein Total 7.4 6.4 - 8.3 g/dL    Albumin 4.5 3.5 - 5.2 g/dL    Bilirubin  Total 0.5 <=1.2 mg/dL   CBC with platelets and differential   Result Value Ref Range    WBC Count 5.4 4.0 - 11.0 10e3/uL    RBC Count 5.57 4.40 - 5.90 10e6/uL    Hemoglobin 16.1 13.3 - 17.7 g/dL    Hematocrit 46.5 40.0 - 53.0 %    MCV 84 78 - 100 fL    MCH 28.9 26.5 - 33.0 pg    MCHC 34.6 31.5 - 36.5 g/dL    RDW 12.8 10.0 - 15.0 %    Platelet Count 228 150 - 450 10e3/uL    % Neutrophils 46 %    % Lymphocytes 39 %    % Monocytes 10 %    % Eosinophils 4 %    % Basophils 1 %    % Immature Granulocytes 0 %    NRBCs per 100 WBC 0 <1 /100    Absolute Neutrophils 2.5 1.6 - 8.3 10e3/uL    Absolute Lymphocytes 2.1 0.8 - 5.3 10e3/uL    Absolute Monocytes 0.6 0.0 - 1.3 10e3/uL    Absolute Eosinophils 0.2 0.0 - 0.7 10e3/uL    Absolute Basophils 0.0 0.0 - 0.2 10e3/uL    Absolute Immature Granulocytes 0.0 <=0.4 10e3/uL    Absolute NRBCs 0.0 10e3/uL   Symptomatic Influenza A/B, RSV, & SARS-CoV2 PCR (COVID-19) Nose   Result Value Ref Range    Influenza A PCR Negative Negative    Influenza B PCR Negative Negative    RSV PCR Negative Negative    SARS CoV2 PCR Negative Negative      Comment:      NEGATIVE: SARS-CoV-2 (COVID-19) RNA not detected, presumed negative.    Narrative    Testing was performed using the Xpert Xpress CoV2/Flu/RSV Assay on the [employer] GeneXpert Instrument. This test should be ordered for the detection of SARS-CoV2, influenza, and RSV viruses in individuals with signs and symptoms of respiratory tract infection. This test is for in vitro diagnostic use under the [place] FDA for laboratories certified under CLIA to perform high or moderate complexity testing. This test has been [place] FDA cleared. A negative result does not rule out the presence of PCR inhibitors in the specimen or target RNA in concentration below the limit of detection for the assay. If only one viral target is positive but coinfection with multiple targets is suspected, the sample should be re-tested with another FDA cleared, approved, or authorized  test, if coninfection would change clinical management. This test was validated by the [facility]. These laboratories are certified under the Clinical Laboratory Improvement Amendments of 1988 (CLIA-88) as qualified to perfom high complexity laboratory testing.       If you have any questions or concerns, please call the clinic at the number listed above.       Sincerely,      Pricilla Henley NP

## 2024-11-06 NOTE — LETTER
November 6, 2024      Casey Pedro  [address] [facility] [address]        Dear ,    We are writing to inform you of your test results.    Your test results fall within the expected range(s) or remain unchanged from previous results.  Please continue with current treatment plan.    Resulted Orders   BNP-N terminal pro   Result Value Ref Range    N Terminal Pro BNP Outpatient <36 0 - 450 pg/mL      Comment:      Reference range shown and results flagged as abnormal are for the outpatient, non acute settings. Establishing a baseline value for each individual patient is useful for follow-up.    Suggested inpatient cut points for confirming diagnosis of CHF in an acute setting are:  >450 pg/mL (age 18 to less than 50)  >900 pg/mL (age 50 to less than 75)  >1800 pg/mL (75 yrs and older)    An inpatient or emergency department NT-proPBNP <300 pg/mL effectively rules out acute CHF, with 99% negative predictive value.       Comprehensive metabolic panel (BMP + Alb, Alk Phos, ALT, AST, Total. Bili, TP)   Result Value Ref Range    Sodium 140 135 - 145 mmol/L    Potassium 4.5 3.4 - 5.3 mmol/L    Carbon Dioxide (CO2) 23 22 - 29 mmol/L    Anion Gap 12 7 - 15 mmol/L    Urea Nitrogen 8.5 6.0 - 20.0 mg/dL    Creatinine 0.63 (L) 0.67 - 1.17 mg/dL    GFR Estimate >90 >60 mL/min/1.73m2      Comment:      eGFR calculated using 2021 CKD-EPI equation.    Calcium 9.6 8.8 - 10.4 mg/dL      Comment:      Reference intervals for this test were updated on 7/16/2024 to reflect our healthy population more accurately. There may be differences in the flagging of prior results with similar values performed with this method. Those prior results can be interpreted in the context of the updated reference intervals.    Chloride 105 98 - 107 mmol/L    Glucose 98 70 - 99 mg/dL    Alkaline Phosphatase 59 40 - 150 U/L    AST 27 0 - 45 U/L    ALT 43 0 - 70 U/L    Protein Total 7.4 6.4 - 8.3 g/dL    Albumin 4.5 3.5 - 5.2 g/dL    Bilirubin  Total 0.5 <=1.2 mg/dL   CBC with platelets and differential   Result Value Ref Range    WBC Count 5.4 4.0 - 11.0 10e3/uL    RBC Count 5.57 4.40 - 5.90 10e6/uL    Hemoglobin 16.1 13.3 - 17.7 g/dL    Hematocrit 46.5 40.0 - 53.0 %    MCV 84 78 - 100 fL    MCH 28.9 26.5 - 33.0 pg    MCHC 34.6 31.5 - 36.5 g/dL    RDW 12.8 10.0 - 15.0 %    Platelet Count 228 150 - 450 10e3/uL    % Neutrophils 46 %    % Lymphocytes 39 %    % Monocytes 10 %    % Eosinophils 4 %    % Basophils 1 %    % Immature Granulocytes 0 %    NRBCs per 100 WBC 0 <1 /100    Absolute Neutrophils 2.5 1.6 - 8.3 10e3/uL    Absolute Lymphocytes 2.1 0.8 - 5.3 10e3/uL    Absolute Monocytes 0.6 0.0 - 1.3 10e3/uL    Absolute Eosinophils 0.2 0.0 - 0.7 10e3/uL    Absolute Basophils 0.0 0.0 - 0.2 10e3/uL    Absolute Immature Granulocytes 0.0 <=0.4 10e3/uL    Absolute NRBCs 0.0 10e3/uL   Symptomatic Influenza A/B, RSV, & SARS-CoV2 PCR (COVID-19) Nose   Result Value Ref Range    Influenza A PCR Negative Negative    Influenza B PCR Negative Negative    RSV PCR Negative Negative    SARS CoV2 PCR Negative Negative      Comment:      NEGATIVE: SARS-CoV-2 (COVID-19) RNA not detected, presumed negative.    Narrative    Testing was performed using the Xpert Xpress CoV2/Flu/RSV Assay on the [employer] GeneXpert Instrument. This test should be ordered for the detection of SARS-CoV2, influenza, and RSV viruses in individuals with signs and symptoms of respiratory tract infection. This test is for in vitro diagnostic use under the [place] FDA for laboratories certified under CLIA to perform high or moderate complexity testing. This test has been [place] FDA cleared. A negative result does not rule out the presence of PCR inhibitors in the specimen or target RNA in concentration below the limit of detection for the assay. If only one viral target is positive but coinfection with multiple targets is suspected, the sample should be re-tested with another FDA cleared, approved, or authorized  test, if coninfection would change clinical management. This test was validated by the [facility]. These laboratories are certified under the Clinical Laboratory Improvement Amendments of 1988 (CLIA-88) as qualified to perfom high complexity laboratory testing.       If you have any questions or concerns, please call the clinic at the number listed above.       Sincerely,      Pricilla Henley NP

## 2024-11-06 NOTE — LETTER
November 6, 2024      Casey Pedro  [address] [facility] [address]        Dear ,    We are writing to inform you of your test results.    {results letter list:[id number]}    Resulted Orders   BNP-N terminal pro   Result Value Ref Range    N Terminal Pro BNP Outpatient <36 0 - 450 pg/mL      Comment:      Reference range shown and results flagged as abnormal are for the outpatient, non acute settings. Establishing a baseline value for each individual patient is useful for follow-up.    Suggested inpatient cut points for confirming diagnosis of CHF in an acute setting are:  >450 pg/mL (age 18 to less than 50)  >900 pg/mL (age 50 to less than 75)  >1800 pg/mL (75 yrs and older)    An inpatient or emergency department NT-proPBNP <300 pg/mL effectively rules out acute CHF, with 99% negative predictive value.       Comprehensive metabolic panel (BMP + Alb, Alk Phos, ALT, AST, Total. Bili, TP)   Result Value Ref Range    Sodium 140 135 - 145 mmol/L    Potassium 4.5 3.4 - 5.3 mmol/L    Carbon Dioxide (CO2) 23 22 - 29 mmol/L    Anion Gap 12 7 - 15 mmol/L    Urea Nitrogen 8.5 6.0 - 20.0 mg/dL    Creatinine 0.63 (L) 0.67 - 1.17 mg/dL    GFR Estimate >90 >60 mL/min/1.73m2      Comment:      eGFR calculated using 2021 CKD-EPI equation.    Calcium 9.6 8.8 - 10.4 mg/dL      Comment:      Reference intervals for this test were updated on 7/16/2024 to reflect our healthy population more accurately. There may be differences in the flagging of prior results with similar values performed with this method. Those prior results can be interpreted in the context of the updated reference intervals.    Chloride 105 98 - 107 mmol/L    Glucose 98 70 - 99 mg/dL    Alkaline Phosphatase 59 40 - 150 U/L    AST 27 0 - 45 U/L    ALT 43 0 - 70 U/L    Protein Total 7.4 6.4 - 8.3 g/dL    Albumin 4.5 3.5 - 5.2 g/dL    Bilirubin Total 0.5 <=1.2 mg/dL   CBC with platelets and differential   Result Value Ref Range    WBC Count 5.4 4.0 - 11.0  10e3/uL    RBC Count 5.57 4.40 - 5.90 10e6/uL    Hemoglobin 16.1 13.3 - 17.7 g/dL    Hematocrit 46.5 40.0 - 53.0 %    MCV 84 78 - 100 fL    MCH 28.9 26.5 - 33.0 pg    MCHC 34.6 31.5 - 36.5 g/dL    RDW 12.8 10.0 - 15.0 %    Platelet Count 228 150 - 450 10e3/uL    % Neutrophils 46 %    % Lymphocytes 39 %    % Monocytes 10 %    % Eosinophils 4 %    % Basophils 1 %    % Immature Granulocytes 0 %    NRBCs per 100 WBC 0 <1 /100    Absolute Neutrophils 2.5 1.6 - 8.3 10e3/uL    Absolute Lymphocytes 2.1 0.8 - 5.3 10e3/uL    Absolute Monocytes 0.6 0.0 - 1.3 10e3/uL    Absolute Eosinophils 0.2 0.0 - 0.7 10e3/uL    Absolute Basophils 0.0 0.0 - 0.2 10e3/uL    Absolute Immature Granulocytes 0.0 <=0.4 10e3/uL    Absolute NRBCs 0.0 10e3/uL       If you have any questions or concerns, please call the clinic at the number listed above.       Sincerely,      Pricilla Henley NP

## 2024-11-11 DIAGNOSIS — J30.1 ALLERGIC RHINITIS DUE TO POLLEN: ICD-10-CM

## 2024-11-11 RX ORDER — FLUTICASONE PROPIONATE 50 MCG
2 SPRAY, SUSPENSION (ML) NASAL DAILY
Qty: 16 G | Refills: 1 | Status: SHIPPED | OUTPATIENT
Start: 2024-11-11

## 2024-11-11 NOTE — TELEPHONE ENCOUNTER
fluticasone (FLONASE) 50 MCG/ACT nasal spray       Last Written Prescription Date:  8/6/24  Last Fill Quantity: 16 g,   # refills: 1  Last Office Visit: 11/6/24  Future Office visit:       Routing refill request to provider for review/approval because:

## 2025-01-14 DIAGNOSIS — J30.1 ALLERGIC RHINITIS DUE TO POLLEN: ICD-10-CM

## 2025-01-14 RX ORDER — FLUTICASONE PROPIONATE 50 MCG
2 SPRAY, SUSPENSION (ML) NASAL DAILY
Qty: 16 G | Refills: 1 | Status: SHIPPED | OUTPATIENT
Start: 2025-01-14

## 2025-01-20 DIAGNOSIS — E11.9 TYPE 2 DIABETES MELLITUS WITHOUT COMPLICATION, WITHOUT LONG-TERM CURRENT USE OF INSULIN (H): ICD-10-CM

## 2025-01-20 RX ORDER — LANCETS
EACH MISCELLANEOUS
Qty: 100 EACH | Refills: 1 | Status: SHIPPED | OUTPATIENT
Start: 2025-01-20

## 2025-02-01 DIAGNOSIS — K59.01 SLOW TRANSIT CONSTIPATION: ICD-10-CM

## 2025-02-03 RX ORDER — AMOXICILLIN 250 MG
1 CAPSULE ORAL 2 TIMES DAILY
Qty: 100 TABLET | Refills: 11 | Status: SHIPPED | OUTPATIENT
Start: 2025-02-03

## 2025-02-03 NOTE — TELEPHONE ENCOUNTER
Senna-Docusate (Senokot-S/Pericolace) 8.6-50 MG tablet     Last Written Prescription Date:  01/29/2024  Last Fill Quantity: 100,   # refills: 11  Last Office Visit: 11/06/2024  Future Office visit:    Next 5 appointments (look out 90 days)      Feb 21, 2025 8:00 AM  (Arrive by 7:45 AM)  Provider Visit with Pricilla Henley NP  [facility] [place] ([facility] [place] ) [address]  [place] [address]  [phone]             Routing refill request to provider for review/approval because:

## 2025-02-15 DIAGNOSIS — K21.9 GASTROESOPHAGEAL REFLUX DISEASE WITHOUT ESOPHAGITIS: ICD-10-CM

## 2025-02-15 DIAGNOSIS — E11.9 TYPE 2 DIABETES MELLITUS WITHOUT COMPLICATION, WITHOUT LONG-TERM CURRENT USE OF INSULIN (H): ICD-10-CM

## 2025-02-17 RX ORDER — FAMOTIDINE 20 MG/1
20 TABLET, FILM COATED ORAL DAILY
Qty: 90 TABLET | Refills: 2 | Status: SHIPPED | OUTPATIENT
Start: 2025-02-17

## 2025-02-17 RX ORDER — METFORMIN HYDROCHLORIDE 500 MG/1
500 TABLET, EXTENDED RELEASE ORAL
Qty: 90 TABLET | Refills: 2 | Status: SHIPPED | OUTPATIENT
Start: 2025-02-17

## 2025-04-14 DIAGNOSIS — J30.1 ALLERGIC RHINITIS DUE TO POLLEN: ICD-10-CM

## 2025-04-14 RX ORDER — FLUTICASONE PROPIONATE 50 MCG
2 SPRAY, SUSPENSION (ML) NASAL DAILY
Qty: 16 G | Refills: 1 | Status: SHIPPED | OUTPATIENT
Start: 2025-04-14

## 2025-05-19 DIAGNOSIS — J30.2 CHRONIC SEASONAL ALLERGIC RHINITIS: ICD-10-CM

## 2025-05-19 RX ORDER — CETIRIZINE HYDROCHLORIDE 10 MG/1
10 TABLET ORAL DAILY
Qty: 30 TABLET | Refills: 8 | Status: SHIPPED | OUTPATIENT
Start: 2025-05-19

## 2025-06-12 DIAGNOSIS — Z00.00 HEALTH CARE MAINTENANCE: ICD-10-CM

## 2025-06-12 RX ORDER — CHLORAL HYDRATE 500 MG
CAPSULE ORAL
Qty: 100 CAPSULE | Refills: 2 | Status: SHIPPED | OUTPATIENT
Start: 2025-06-12

## 2025-06-26 DIAGNOSIS — E11.9 TYPE 2 DIABETES MELLITUS WITHOUT COMPLICATION, WITHOUT LONG-TERM CURRENT USE OF INSULIN (H): ICD-10-CM

## 2025-06-26 RX ORDER — ASPIRIN 81 MG/1
81 TABLET, COATED ORAL DAILY
Qty: 90 TABLET | Refills: 1 | Status: SHIPPED | OUTPATIENT
Start: 2025-06-26

## 2025-06-26 RX ORDER — ASPIRIN 81 MG/1
81 TABLET, COATED ORAL DAILY
Qty: 90 TABLET | Refills: 1 | OUTPATIENT
Start: 2025-06-26

## 2025-06-26 NOTE — TELEPHONE ENCOUNTER
ASPIRIN LOW DOSE 81 MG EC tablet       Last Written Prescription Date:  10/17/24  Last Fill Quantity: 120,   # refills: 1  Last Office Visit: 2/21/25  Future Office visit:    Next 5 appointments (look out 90 days)      Aug 25, 2025 9:00 AM  (Arrive by 8:45 AM)  Provider Visit with Pricilla Henley NP  [facility] [place] ([facility] [place] ) [address]  [place] [address]  [phone]             Routing refill request to provider for review/approval because:  Drug not on the [facility], P or [facility] refill protocol or controlled substance

## 2025-07-01 ENCOUNTER — TRANSFERRED RECORDS (OUTPATIENT)
Dept: HEALTH INFORMATION MANAGEMENT | Facility: CLINIC | Age: 45
End: 2025-07-01

## 2025-07-01 LAB — RETINOPATHY: NORMAL

## 2025-07-09 ENCOUNTER — TELEPHONE (OUTPATIENT)
Dept: FAMILY MEDICINE | Facility: OTHER | Age: 45
End: 2025-07-09

## 2025-07-09 NOTE — TELEPHONE ENCOUNTER
Attempt # 1  Outcome: Left Message   Comment: 7/9 LVM to R/S as Provider out 8/25 - Ok to use SDS 1st wk in Sept

## 2025-07-21 DIAGNOSIS — E11.9 TYPE 2 DIABETES MELLITUS WITHOUT COMPLICATION, WITHOUT LONG-TERM CURRENT USE OF INSULIN (H): ICD-10-CM

## 2025-07-21 RX ORDER — CALCIUM CITRATE/VITAMIN D3 200MG-6.25
TABLET ORAL 2 TIMES DAILY
Qty: 200 STRIP | Refills: 1 | Status: SHIPPED | OUTPATIENT
Start: 2025-07-21

## 2025-07-24 DIAGNOSIS — E11.9 TYPE 2 DIABETES MELLITUS WITHOUT COMPLICATION, WITHOUT LONG-TERM CURRENT USE OF INSULIN (H): ICD-10-CM

## 2025-07-24 RX ORDER — ATORVASTATIN CALCIUM 40 MG/1
40 TABLET, FILM COATED ORAL DAILY
Qty: 90 TABLET | Refills: 2 | Status: SHIPPED | OUTPATIENT
Start: 2025-07-24

## 2025-07-31 DIAGNOSIS — E55.9 VITAMIN D DEFICIENCY: ICD-10-CM

## 2025-07-31 RX ORDER — CHOLECALCIFEROL (VITAMIN D3) 50 MCG
TABLET ORAL
Qty: 100 TABLET | Refills: 2 | Status: SHIPPED | OUTPATIENT
Start: 2025-07-31

## 2025-08-05 ENCOUNTER — MEDICAL CORRESPONDENCE (OUTPATIENT)
Dept: HEALTH INFORMATION MANAGEMENT | Facility: HOSPITAL | Age: 45
End: 2025-08-05

## 2025-09-03 ENCOUNTER — OFFICE VISIT (OUTPATIENT)
Dept: FAMILY MEDICINE | Facility: OTHER | Age: 45
End: 2025-09-03
Attending: NURSE PRACTITIONER
Payer: MEDICARE

## 2025-09-03 VITALS
HEART RATE: 86 BPM | WEIGHT: 208 LBS | BODY MASS INDEX: 26.69 KG/M2 | TEMPERATURE: 98.7 F | DIASTOLIC BLOOD PRESSURE: 70 MMHG | RESPIRATION RATE: 18 BRPM | OXYGEN SATURATION: 98 % | HEIGHT: 74 IN | SYSTOLIC BLOOD PRESSURE: 120 MMHG

## 2025-09-03 DIAGNOSIS — Z79.899 DRUG THERAPY: ICD-10-CM

## 2025-09-03 DIAGNOSIS — I10 BENIGN ESSENTIAL HYPERTENSION: Primary | ICD-10-CM

## 2025-09-03 DIAGNOSIS — E78.5 HYPERLIPIDEMIA, UNSPECIFIED HYPERLIPIDEMIA TYPE: ICD-10-CM

## 2025-09-03 DIAGNOSIS — E11.9 TYPE 2 DIABETES MELLITUS WITHOUT COMPLICATION, WITHOUT LONG-TERM CURRENT USE OF INSULIN (H): ICD-10-CM

## 2025-09-03 DIAGNOSIS — J45.20 MILD INTERMITTENT ASTHMA WITHOUT COMPLICATION: ICD-10-CM

## 2025-09-03 DIAGNOSIS — R56.9 CONVULSIONS, UNSPECIFIED CONVULSION TYPE (H): ICD-10-CM

## 2025-09-03 LAB
ALBUMIN SERPL BCG-MCNC: 4.8 G/DL (ref 3.5–5.2)
ALP SERPL-CCNC: 54 U/L (ref 40–150)
ALT SERPL W P-5'-P-CCNC: 37 U/L (ref 0–70)
AMMONIA PLAS-SCNC: 38 UMOL/L (ref 16–60)
ANION GAP SERPL CALCULATED.3IONS-SCNC: 13 MMOL/L (ref 7–15)
AST SERPL W P-5'-P-CCNC: 24 U/L (ref 0–45)
BASOPHILS # BLD AUTO: 0.04 10E3/UL (ref 0–0.2)
BASOPHILS NFR BLD AUTO: 0.7 %
BILIRUB DIRECT SERPL-MCNC: 0.2 MG/DL (ref 0–0.3)
BILIRUB SERPL-MCNC: 0.6 MG/DL
BUN SERPL-MCNC: 8.3 MG/DL (ref 6–20)
CALCIUM SERPL-MCNC: 9.7 MG/DL (ref 8.8–10.4)
CHLORIDE SERPL-SCNC: 96 MMOL/L (ref 98–107)
CREAT SERPL-MCNC: 0.67 MG/DL (ref 0.67–1.17)
CREAT UR-MCNC: 23.2 MG/DL
EGFRCR SERPLBLD CKD-EPI 2021: >90 ML/MIN/1.73M2
EOSINOPHIL # BLD AUTO: 0.15 10E3/UL (ref 0–0.7)
EOSINOPHIL NFR BLD AUTO: 2.6 %
ERYTHROCYTE [DISTWIDTH] IN BLOOD BY AUTOMATED COUNT: 12.3 % (ref 10–15)
EST. AVERAGE GLUCOSE BLD GHB EST-MCNC: 108 MG/DL
GLUCOSE SERPL-MCNC: 105 MG/DL (ref 70–99)
HBA1C MFR BLD: 5.4 %
HCO3 SERPL-SCNC: 26 MMOL/L (ref 22–29)
HCT VFR BLD AUTO: 48 % (ref 40–53)
HGB BLD-MCNC: 17 G/DL (ref 13.3–17.7)
IMM GRANULOCYTES # BLD: <0.03 10E3/UL
IMM GRANULOCYTES NFR BLD: 0.2 %
LYMPHOCYTES # BLD AUTO: 2.02 10E3/UL (ref 0.8–5.3)
LYMPHOCYTES NFR BLD AUTO: 35.6 %
MCH RBC QN AUTO: 29.3 PG (ref 26.5–33)
MCHC RBC AUTO-ENTMCNC: 35.4 G/DL (ref 31.5–36.5)
MCV RBC AUTO: 82.6 FL (ref 78–100)
MICROALBUMIN UR-MCNC: <12 MG/L
MICROALBUMIN/CREAT UR: NORMAL MG/G{CREAT}
MONOCYTES # BLD AUTO: 0.51 10E3/UL (ref 0–1.3)
MONOCYTES NFR BLD AUTO: 9 %
NEUTROPHILS # BLD AUTO: 2.94 10E3/UL (ref 1.6–8.3)
NEUTROPHILS NFR BLD AUTO: 51.9 %
NRBC # BLD AUTO: <0.03 10E3/UL
NRBC BLD AUTO-RTO: 0 /100
PLATELET # BLD AUTO: 210 10E3/UL (ref 150–450)
POTASSIUM SERPL-SCNC: 4.5 MMOL/L (ref 3.4–5.3)
PROT SERPL-MCNC: 7.5 G/DL (ref 6.4–8.3)
RBC # BLD AUTO: 5.81 10E6/UL (ref 4.4–5.9)
SODIUM SERPL-SCNC: 135 MMOL/L (ref 135–145)
WBC # BLD AUTO: 5.67 10E3/UL (ref 4–11)

## 2025-09-03 PROCEDURE — 82248 BILIRUBIN DIRECT: CPT | Mod: ZL | Performed by: NURSE PRACTITIONER

## 2025-09-03 PROCEDURE — 83036 HEMOGLOBIN GLYCOSYLATED A1C: CPT | Mod: ZL | Performed by: NURSE PRACTITIONER

## 2025-09-03 PROCEDURE — 36415 COLL VENOUS BLD VENIPUNCTURE: CPT | Mod: ZL | Performed by: NURSE PRACTITIONER

## 2025-09-03 PROCEDURE — G0463 HOSPITAL OUTPT CLINIC VISIT: HCPCS

## 2025-09-03 PROCEDURE — 85014 HEMATOCRIT: CPT | Mod: ZL | Performed by: NURSE PRACTITIONER

## 2025-09-03 PROCEDURE — 82140 ASSAY OF AMMONIA: CPT | Mod: ZL | Performed by: NURSE PRACTITIONER

## 2025-09-03 PROCEDURE — 82043 UR ALBUMIN QUANTITATIVE: CPT | Mod: ZL | Performed by: NURSE PRACTITIONER

## 2025-09-03 ASSESSMENT — ASTHMA QUESTIONNAIRES
QUESTION_5 LAST FOUR WEEKS HOW WOULD YOU RATE YOUR ASTHMA CONTROL: COMPLETELY CONTROLLED
QUESTION_2 LAST FOUR WEEKS HOW OFTEN HAVE YOU HAD SHORTNESS OF BREATH: NOT AT ALL
QUESTION_3 LAST FOUR WEEKS HOW OFTEN DID YOUR ASTHMA SYMPTOMS (WHEEZING, COUGHING, SHORTNESS OF BREATH, CHEST TIGHTNESS OR PAIN) WAKE YOU UP AT NIGHT OR EARLIER THAN USUAL IN THE MORNING: NOT AT ALL
ACT_TOTALSCORE: 25
QUESTION_4 LAST FOUR WEEKS HOW OFTEN HAVE YOU USED YOUR RESCUE INHALER OR NEBULIZER MEDICATION (SUCH AS ALBUTEROL): NOT AT ALL
QUESTION_1 LAST FOUR WEEKS HOW MUCH OF THE TIME DID YOUR ASTHMA KEEP YOU FROM GETTING AS MUCH DONE AT WORK, SCHOOL OR AT HOME: NONE OF THE TIME

## 2025-09-03 ASSESSMENT — PAIN SCALES - GENERAL: PAINLEVEL_OUTOF10: NO PAIN (0)

## 2025-09-04 DIAGNOSIS — N42.9 DISORDER OF PROSTATE: ICD-10-CM

## 2025-09-04 RX ORDER — ISOPROPYL ALCOHOL 70 ML/100ML
SWAB TOPICAL
Qty: 200 EACH | Refills: 11 | Status: SHIPPED | OUTPATIENT
Start: 2025-09-04